# Patient Record
Sex: MALE | Race: WHITE | NOT HISPANIC OR LATINO | Employment: FULL TIME | URBAN - METROPOLITAN AREA
[De-identification: names, ages, dates, MRNs, and addresses within clinical notes are randomized per-mention and may not be internally consistent; named-entity substitution may affect disease eponyms.]

---

## 2019-06-14 ENCOUNTER — TELEPHONE (OUTPATIENT)
Dept: ENDOCRINOLOGY | Facility: CLINIC | Age: 24
End: 2019-06-14

## 2019-06-14 NOTE — TELEPHONE ENCOUNTER
----- Message from Yanira Coombs sent at 6/14/2019  9:54 AM CDT -----  Contact: pt's mother  Pt's mother called     Dr Frances had  Spoken to you about this pt and low testosterone levels    Pt had appt yesterday but was not notified       Just discovered today when mother called to see if we know anything about getting an appt    Can you please work this pt in   And  Call mother - Gina      [phone]    Thanks

## 2019-06-14 NOTE — TELEPHONE ENCOUNTER
Left a message for pt mother. We do not have any slot available at this time. July 1st is when Nov scheduled will be out. Pt mother and pt can call at that time to scheduled. Pt is on our waiting list. I also talk to neil who handles reminder phone call she have on paper she call pt three times. Pt VM was not set up.

## 2019-06-17 ENCOUNTER — TELEPHONE (OUTPATIENT)
Dept: ENDOCRINOLOGY | Facility: CLINIC | Age: 24
End: 2019-06-17

## 2019-06-17 NOTE — TELEPHONE ENCOUNTER
Left message regarding appointment. Appointment scheduled with Dr Luu on 07/01 at 9:00am.     Appointment reminder will be mailed to patient

## 2019-07-01 ENCOUNTER — OFFICE VISIT (OUTPATIENT)
Dept: ENDOCRINOLOGY | Facility: CLINIC | Age: 24
End: 2019-07-01
Payer: COMMERCIAL

## 2019-07-01 VITALS
HEIGHT: 74 IN | BODY MASS INDEX: 26 KG/M2 | SYSTOLIC BLOOD PRESSURE: 106 MMHG | HEART RATE: 60 BPM | DIASTOLIC BLOOD PRESSURE: 78 MMHG | WEIGHT: 202.63 LBS

## 2019-07-01 DIAGNOSIS — R94.7 ABNORMAL RESULTS OF OTHER ENDOCRINE FUNCTION STUDIES: Primary | ICD-10-CM

## 2019-07-01 DIAGNOSIS — R94.7 ENDOCRINE FUNCTION STUDY ABNORMALITY: ICD-10-CM

## 2019-07-01 PROCEDURE — 3008F BODY MASS INDEX DOCD: CPT | Mod: CPTII,S$GLB,, | Performed by: INTERNAL MEDICINE

## 2019-07-01 PROCEDURE — 99999 PR PBB SHADOW E&M-EST. PATIENT-LVL III: CPT | Mod: PBBFAC,,, | Performed by: INTERNAL MEDICINE

## 2019-07-01 PROCEDURE — 99999 PR PBB SHADOW E&M-EST. PATIENT-LVL III: ICD-10-PCS | Mod: PBBFAC,,, | Performed by: INTERNAL MEDICINE

## 2019-07-01 PROCEDURE — 99204 PR OFFICE/OUTPT VISIT, NEW, LEVL IV, 45-59 MIN: ICD-10-PCS | Mod: S$GLB,,, | Performed by: INTERNAL MEDICINE

## 2019-07-01 PROCEDURE — 3008F PR BODY MASS INDEX (BMI) DOCUMENTED: ICD-10-PCS | Mod: CPTII,S$GLB,, | Performed by: INTERNAL MEDICINE

## 2019-07-01 PROCEDURE — 99204 OFFICE O/P NEW MOD 45 MIN: CPT | Mod: S$GLB,,, | Performed by: INTERNAL MEDICINE

## 2019-07-01 RX ORDER — TESTOSTERONE CYPIONATE 200 MG/ML
INJECTION, SOLUTION INTRAMUSCULAR
Refills: 2 | COMMUNITY
Start: 2019-06-23 | End: 2019-09-06

## 2019-07-01 NOTE — PROGRESS NOTES
Subjective:      Patient ID: Ab Girard is a 24 y.o. male.    Chief Complaint:  Hypogonadism      History of Present Illness  With regards to hypogonadism:  Mom is here   She gets pellets with Dr Frances     Symptoms started  Years ago - since puberty  + Fatigue   +mental fogginess   +depression  trt did not help these symptoms '  Puberty was nl     Nl libido   Has a partner   satisfied with performance  Maybe drive is better with trt    No nipple tenderness or gynecomastia     Does not snore   No children   No fertility attempts       Various protein powders - no t boosters or anabolics used          pcp checked T  - Dr Brandt   3/15/19   tsh nl  cmp nl  H/h 15/46   t t 240  915 am     trt was started 200 mg q 2 weeks     5/10/19 right before injection was due   t was 135   H/h 15/48   The dose was increased to 200 mg q week  He does 100 mg 2 x week       Reproductive:  No prior h/o hypogonadism.  Normal development.   Puberty onset at age      possible slight decrease  in testicular size.   Able to engage in and complete intercourse      No h/o liver and kidney disease  No h/o hyperthyroidism        Review of Systems   Constitutional: Negative for unexpected weight change.   Eyes: Negative for visual disturbance.   Respiratory: Negative for shortness of breath.    Cardiovascular: Negative for chest pain.   Gastrointestinal: Negative for abdominal pain.   Musculoskeletal: Negative for myalgias.   Skin: Negative for wound.   Neurological: Negative for headaches.   Hematological: Does not bruise/bleed easily.   Psychiatric/Behavioral: Negative for sleep disturbance.       Objective:   Physical Exam   Constitutional: He appears well-developed.   HENT:   Right Ear: External ear normal.   Left Ear: External ear normal.   Nose: Nose normal.   Hearing normal    Neck: No tracheal deviation present. No thyromegaly present.   Cardiovascular: Normal rate.   No murmur heard.  Pulmonary/Chest: Effort normal and breath sounds  normal.   Abdominal: Soft. He exhibits no mass.   No hernia noted   Musculoskeletal: He exhibits no edema.   Neurological: He is alert. No cranial nerve deficit or sensory deficit. Coordination and gait normal.        Skin: No rash noted.   No nodules   Psychiatric: He has a normal mood and affect. Judgment normal.   Vitals reviewed.  no gynecomastia   Nl testicular volume       Body mass index is 26.01 kg/m².    Lab Reviewed:       Assessment and Plan     Endocrine function study abnormality  Hold trt for 1 month     Repeat Testosterone paired with LH, PRL  - this will determine next step       No apparent risk factors identified at this time.      If trt needed would consider pellets

## 2019-07-01 NOTE — ASSESSMENT & PLAN NOTE
Repeat Testosterone paired with LH, PRL  - this will determine next step       No apparent risk factors identified at this time.      If trt needed would consider pellets

## 2019-07-01 NOTE — LETTER
July 1, 2019      Modesta Frances MD  [address]  [address]  [facility] [address]           [facility] - Endocrinology [address]th [place]  [address]  [place] [address]  Phone: [phone]          Patient: Ab Girard   MR Number: [id number]   YOB: 1995   Date of Visit: 7/1/2019       Dear Dr. Modesta Frances:    Thank you for referring Ab Girard to me for evaluation. Attached you will find relevant portions of my assessment and plan of care.    If you have questions, please do not hesitate to call me. I look forward to following Ab Girard along with you.    Sincerely,    Estelita Luu MD    Enclosure  CC:  No Recipients    If you would like to receive this communication electronically, please contact [email or web address] or [phone] to request more information on [employer] Link access.    For providers and/or their staff who would like to refer a patient to [facility], please contact us through our one-stop-shop provider referral line, [facility], at [phone].    If you feel you have received this communication in error or would no longer like to receive these types of communications, please e-mail [email or web address]

## 2019-07-26 ENCOUNTER — LAB VISIT (OUTPATIENT)
Dept: LAB | Facility: HOSPITAL | Age: 24
End: 2019-07-26
Attending: INTERNAL MEDICINE
Payer: COMMERCIAL

## 2019-07-26 DIAGNOSIS — R94.7 ABNORMAL RESULTS OF OTHER ENDOCRINE FUNCTION STUDIES: ICD-10-CM

## 2019-07-26 LAB
ALBUMIN SERPL BCP-MCNC: 4.7 G/DL (ref 3.5–5.2)
ALP SERPL-CCNC: 66 U/L (ref 38–126)
ALT SERPL W/O P-5'-P-CCNC: 78 U/L (ref 10–44)
ANION GAP SERPL CALC-SCNC: 8 MMOL/L (ref 8–16)
AST SERPL-CCNC: 48 U/L (ref 15–46)
BILIRUB SERPL-MCNC: 1 MG/DL (ref 0.1–1)
BUN SERPL-MCNC: 25 MG/DL (ref 2–20)
CALCIUM SERPL-MCNC: 10.2 MG/DL (ref 8.7–10.5)
CHLORIDE SERPL-SCNC: 99 MMOL/L (ref 95–110)
CO2 SERPL-SCNC: 33 MMOL/L (ref 23–29)
CREAT SERPL-MCNC: 1.27 MG/DL (ref 0.5–1.4)
EST. GFR  (AFRICAN AMERICAN): >60 ML/MIN/1.73 M^2
EST. GFR  (NON AFRICAN AMERICAN): >60 ML/MIN/1.73 M^2
ESTRADIOL SERPL-MCNC: 16 PG/ML (ref 11–44)
FSH SERPL-ACNC: 1.3 MIU/ML (ref 0.95–11.95)
GLUCOSE SERPL-MCNC: 55 MG/DL (ref 70–110)
LH SERPL-ACNC: 1.9 MIU/ML (ref 0.6–12.1)
POTASSIUM SERPL-SCNC: 4 MMOL/L (ref 3.5–5.1)
PROLACTIN SERPL IA-MCNC: 21.1 NG/ML (ref 3.5–19.4)
PROT SERPL-MCNC: 7.3 G/DL (ref 6–8.4)
SODIUM SERPL-SCNC: 140 MMOL/L (ref 136–145)
TESTOST SERPL-MCNC: 390 NG/DL (ref 304–1227)

## 2019-07-26 PROCEDURE — 83002 ASSAY OF GONADOTROPIN (LH): CPT | Mod: PO

## 2019-07-26 PROCEDURE — 80053 COMPREHEN METABOLIC PANEL: CPT | Mod: PO

## 2019-07-26 PROCEDURE — 84146 ASSAY OF PROLACTIN: CPT | Mod: PO

## 2019-07-26 PROCEDURE — 82670 ASSAY OF TOTAL ESTRADIOL: CPT | Mod: PO

## 2019-07-26 PROCEDURE — 82040 ASSAY OF SERUM ALBUMIN: CPT | Mod: PO

## 2019-07-26 PROCEDURE — 83001 ASSAY OF GONADOTROPIN (FSH): CPT | Mod: PO

## 2019-07-26 PROCEDURE — 84403 ASSAY OF TOTAL TESTOSTERONE: CPT | Mod: PO

## 2019-07-29 ENCOUNTER — TELEPHONE (OUTPATIENT)
Dept: ENDOCRINOLOGY | Facility: CLINIC | Age: 24
End: 2019-07-29

## 2019-07-29 ENCOUNTER — PATIENT MESSAGE (OUTPATIENT)
Dept: ENDOCRINOLOGY | Facility: CLINIC | Age: 24
End: 2019-07-29

## 2019-07-29 DIAGNOSIS — R94.7 ENDOCRINE FUNCTION STUDY ABNORMALITY: Primary | ICD-10-CM

## 2019-07-31 LAB
ALBUMIN SERPL-MCNC: 4.8 G/DL (ref 3.6–5.1)
SHBG SERPL-SCNC: 37 NMOL/L (ref 10–50)
TESTOST FREE SERPL-MCNC: 42.9 PG/ML (ref 46–224)
TESTOST SERPL-MCNC: 370 NG/DL (ref 250–1100)
TESTOSTERONE.FREE+WB SERPL-MCNC: 93.7 NG/DL (ref 110–575)

## 2019-08-02 ENCOUNTER — LAB VISIT (OUTPATIENT)
Dept: LAB | Facility: HOSPITAL | Age: 24
End: 2019-08-02
Attending: INTERNAL MEDICINE
Payer: COMMERCIAL

## 2019-08-02 DIAGNOSIS — R94.7 ENDOCRINE FUNCTION STUDY ABNORMALITY: ICD-10-CM

## 2019-08-02 LAB
ALBUMIN SERPL BCP-MCNC: 4.3 G/DL (ref 3.5–5.2)
ALP SERPL-CCNC: 65 U/L (ref 38–126)
ALT SERPL W/O P-5'-P-CCNC: 55 U/L (ref 10–44)
ANION GAP SERPL CALC-SCNC: 7 MMOL/L (ref 8–16)
AST SERPL-CCNC: 48 U/L (ref 15–46)
BILIRUB SERPL-MCNC: 0.5 MG/DL (ref 0.1–1)
BUN SERPL-MCNC: 25 MG/DL (ref 2–20)
CALCIUM SERPL-MCNC: 9.9 MG/DL (ref 8.7–10.5)
CHLORIDE SERPL-SCNC: 101 MMOL/L (ref 95–110)
CO2 SERPL-SCNC: 32 MMOL/L (ref 23–29)
CREAT SERPL-MCNC: 1.48 MG/DL (ref 0.5–1.4)
EST. GFR  (AFRICAN AMERICAN): >60 ML/MIN/1.73 M^2
EST. GFR  (NON AFRICAN AMERICAN): >60 ML/MIN/1.73 M^2
FERRITIN SERPL-MCNC: 82 NG/ML (ref 20–300)
GLUCOSE SERPL-MCNC: 66 MG/DL (ref 70–110)
POTASSIUM SERPL-SCNC: 4.1 MMOL/L (ref 3.5–5.1)
PROLACTIN SERPL IA-MCNC: 19.3 NG/ML (ref 3.5–19.4)
PROT SERPL-MCNC: 6.8 G/DL (ref 6–8.4)
SODIUM SERPL-SCNC: 140 MMOL/L (ref 136–145)

## 2019-08-02 PROCEDURE — 84305 ASSAY OF SOMATOMEDIN: CPT | Mod: PO

## 2019-08-02 PROCEDURE — 82040 ASSAY OF SERUM ALBUMIN: CPT | Mod: PO

## 2019-08-02 PROCEDURE — 84146 ASSAY OF PROLACTIN: CPT | Mod: PO

## 2019-08-02 PROCEDURE — 82728 ASSAY OF FERRITIN: CPT

## 2019-08-02 PROCEDURE — 80053 COMPREHEN METABOLIC PANEL: CPT | Mod: PO

## 2019-08-07 LAB
ALBUMIN SERPL-MCNC: 4.5 G/DL (ref 3.6–5.1)
SHBG SERPL-SCNC: 36 NMOL/L (ref 10–50)
TESTOST FREE SERPL-MCNC: 38.7 PG/ML (ref 46–224)
TESTOST SERPL-MCNC: 325 NG/DL (ref 250–1100)
TESTOSTERONE.FREE+WB SERPL-MCNC: 79.6 NG/DL (ref 110–575)

## 2019-08-08 ENCOUNTER — DOCUMENTATION ONLY (OUTPATIENT)
Dept: TRANSPLANT | Facility: CLINIC | Age: 24
End: 2019-08-08

## 2019-08-08 DIAGNOSIS — R74.8 ABNORMAL LIVER ENZYMES: Primary | ICD-10-CM

## 2019-08-08 DIAGNOSIS — R94.7 ENDOCRINE FUNCTION STUDY ABNORMALITY: ICD-10-CM

## 2019-08-08 LAB
IGF-I SERPL-MCNC: 138 NG/ML (ref 91–442)
IGF-I Z-SCORE SERPL: -1.28 SD

## 2019-08-08 NOTE — NURSING
Pt records reviewed.   Pt will be referred to Hepatology.  Abnormal liver enzymes  Initial referral received  from the [facility]que.   Referring Provider/diagnosis  Estelita Luu MD      Referral letter sent to patient.

## 2019-08-08 NOTE — LETTER
August 8, 2019    Ab Girard  [address] [place] [id number]      Dear Ab Girard:    Your doctor has referred you to the [facility]. We are sending this letter to remind you to make an appointment with us to complete the referral process.     Please call us at [phone] to schedule an appointment. We look forward to seeing you soon.     If you received a call and have been scheduled, please disregard this letter.       Sincerely,        [facility]   [address] [place] [id number]  [phone]

## 2019-08-12 ENCOUNTER — TELEPHONE (OUTPATIENT)
Dept: ENDOCRINOLOGY | Facility: CLINIC | Age: 24
End: 2019-08-12

## 2019-08-12 NOTE — TELEPHONE ENCOUNTER
----- Message from Ene Reyes sent at 8/12/2019 12:05 PM CDT -----  Contact:    Gina  [phone]  Needs Advice    Reason for call:   An appt         Communication Preference:  Phone     Additional Information:   Returning the nurse in regards to scheduling an MRI appt for the pt

## 2019-08-21 ENCOUNTER — PATIENT MESSAGE (OUTPATIENT)
Dept: ENDOCRINOLOGY | Facility: CLINIC | Age: 24
End: 2019-08-21

## 2019-08-21 DIAGNOSIS — R94.7 ENDOCRINE FUNCTION STUDY ABNORMALITY: Primary | ICD-10-CM

## 2019-09-06 ENCOUNTER — PROCEDURE VISIT (OUTPATIENT)
Dept: HEPATOLOGY | Facility: CLINIC | Age: 24
End: 2019-09-06
Attending: INTERNAL MEDICINE
Payer: COMMERCIAL

## 2019-09-06 ENCOUNTER — PATIENT MESSAGE (OUTPATIENT)
Dept: TRANSPLANT | Facility: CLINIC | Age: 24
End: 2019-09-06

## 2019-09-06 ENCOUNTER — HOSPITAL ENCOUNTER (OUTPATIENT)
Dept: RADIOLOGY | Facility: HOSPITAL | Age: 24
Discharge: HOME | End: 2019-09-06
Attending: INTERNAL MEDICINE
Payer: COMMERCIAL

## 2019-09-06 ENCOUNTER — OFFICE VISIT (OUTPATIENT)
Dept: HEPATOLOGY | Facility: CLINIC | Age: 24
End: 2019-09-06
Payer: COMMERCIAL

## 2019-09-06 ENCOUNTER — TELEPHONE (OUTPATIENT)
Dept: TRANSPLANT | Facility: CLINIC | Age: 24
End: 2019-09-06

## 2019-09-06 VITALS
OXYGEN SATURATION: 96 % | RESPIRATION RATE: 18 BRPM | SYSTOLIC BLOOD PRESSURE: 119 MMHG | HEART RATE: 49 BPM | BODY MASS INDEX: 24.84 KG/M2 | DIASTOLIC BLOOD PRESSURE: 64 MMHG | HEIGHT: 74 IN | WEIGHT: 193.56 LBS | TEMPERATURE: 97 F

## 2019-09-06 DIAGNOSIS — R74.8 ELEVATED LIVER ENZYMES: ICD-10-CM

## 2019-09-06 DIAGNOSIS — R74.8 ELEVATED LIVER ENZYMES: Primary | ICD-10-CM

## 2019-09-06 PROCEDURE — 76705 ECHO EXAM OF ABDOMEN: CPT | Mod: TC

## 2019-09-06 PROCEDURE — 93975 VASCULAR STUDY: CPT | Mod: 26,,, | Performed by: RADIOLOGY

## 2019-09-06 PROCEDURE — 3008F BODY MASS INDEX DOCD: CPT | Mod: CPTII,S$GLB,, | Performed by: INTERNAL MEDICINE

## 2019-09-06 PROCEDURE — 76705 US LIVER WITH DOPPLER: ICD-10-PCS | Mod: 26,59,, | Performed by: RADIOLOGY

## 2019-09-06 PROCEDURE — 91200 LIVER ELASTOGRAPHY: CPT | Mod: S$GLB,,, | Performed by: INTERNAL MEDICINE

## 2019-09-06 PROCEDURE — 93975 VASCULAR STUDY: CPT | Mod: TC

## 2019-09-06 PROCEDURE — 93975 US LIVER WITH DOPPLER: ICD-10-PCS | Mod: 26,,, | Performed by: RADIOLOGY

## 2019-09-06 PROCEDURE — 3008F PR BODY MASS INDEX (BMI) DOCUMENTED: ICD-10-PCS | Mod: CPTII,S$GLB,, | Performed by: INTERNAL MEDICINE

## 2019-09-06 PROCEDURE — 99204 OFFICE O/P NEW MOD 45 MIN: CPT | Mod: S$GLB,,, | Performed by: INTERNAL MEDICINE

## 2019-09-06 PROCEDURE — 91200 PR LIVER ELASTOGRAPHY W/OUT IMAG W/INTERP & REPORT: ICD-10-PCS | Mod: S$GLB,,, | Performed by: INTERNAL MEDICINE

## 2019-09-06 PROCEDURE — 99999 PR PBB SHADOW E&M-EST. PATIENT-LVL IV: CPT | Mod: PBBFAC,,, | Performed by: INTERNAL MEDICINE

## 2019-09-06 PROCEDURE — 99204 PR OFFICE/OUTPT VISIT, NEW, LEVL IV, 45-59 MIN: ICD-10-PCS | Mod: S$GLB,,, | Performed by: INTERNAL MEDICINE

## 2019-09-06 PROCEDURE — 76705 ECHO EXAM OF ABDOMEN: CPT | Mod: 26,59,, | Performed by: RADIOLOGY

## 2019-09-06 PROCEDURE — 99999 PR PBB SHADOW E&M-EST. PATIENT-LVL IV: ICD-10-PCS | Mod: PBBFAC,,, | Performed by: INTERNAL MEDICINE

## 2019-09-06 NOTE — PATIENT INSTRUCTIONS
· Your liver is irritated but your liver is functioning well  · Stop taking Creatinine supplements  · Ok to consume lean protein  · Please schedule blood tests and Ultrasound

## 2019-09-06 NOTE — LETTER
September 8, 2019      Estelita Luu MD  [address]  [place] [address]           [facility] - Hepatology  [address]  [place] [address]  Phone: [phone]  Fax: [phone]          Patient: Ab Girard   MR Number: [id number]   YOB: 1995   Date of Visit: 9/6/2019       Dear Dr. Estelita Luu:    Thank you for referring Ab Girard to me for evaluation. Attached you will find relevant portions of my assessment and plan of care.    If you have questions, please do not hesitate to call me. I look forward to following Ab Girard along with you.    Sincerely,    Antony Decker MD    Enclosure  CC:  No Recipients    If you would like to receive this communication electronically, please contact [email or web address] or [phone] to request more information on [employer] Link access.    For providers and/or their staff who would like to refer a patient to [facility], please contact us through our one-stop-shop provider referral line, [facility], at [phone].    If you feel you have received this communication in error or would no longer like to receive these types of communications, please e-mail [email or web address]

## 2019-09-06 NOTE — PROCEDURES
Procedures   Vibration-controlled Transient Elastography Procedure     Name: Ab Girard  Date of Procedure : 2019   :: Antnoy Decker MD  Diagnosis: other - elevated liver tests    Probe: M    Universal Protocol: Patient's identity, procedure and site were verified, confirmatory pause was performed.  Discussed procedure including risks and potential complications.  Questions answered.  Patient verbalizes understanding and wishes to proceed with VCTE.     Procedure: After providing explanations of the procedure, patient was placed in the supine position with right arm in maximum abduction to allow optimal exposure of right lateral abdomen.  Patient was briefly assessed, Testing was performed in the mid-axillary location, 50Hz Shear Wave pulses were applied and the resulting Shear Wave and Propagation Speed detected with a 3.5 MHz ultrasonic signal, using the FibroScan probe, Skin to liver capsule distance and liver parenchyma were accessed during the entire examination with the FibroScan probe, Patient was instructed to breathe normally and to abstain from sudden movements during the procedure, allowing for random measurements of liver stiffness. At least 10 Shear Waves were produced, Individual measurements of each Shear Wave were calculated.  Patient tolerated the procedure well with no complications.  Meets discharge criteria as was dismissed.  Rates pain 0 out of 10.  Patient will follow up with ordering provider to review results.      Findings  Median liver stiffness score: 7.3 KPa  CAP readin dB/m    IQR/med: 7 %    Interpretation  Fibrosis interpretation is based on medial liver stiffness - Kilopascal (kPa).     Fibrosis stage: F 0-1     Steatosis interpretation is based on controlled attenuation parameter - (dB/m).    Steatosis grade: <S1       Antony Decker MD  Staff Physician  Transplant Hepatology  [facility]

## 2019-09-08 NOTE — PROGRESS NOTES
Hepatology Consult Note    Referring provider: Dr. Estelita Luu    Chief complaint:   Chief Complaint   Patient presents with    Elevated Hepatic Enzymes       HPI:  Ab Girard is a pleasant 24 y.o. malewho was referred to Hepatology Clinic for consultation of had concerns including Elevated Hepatic Enzymes..      As regards to liver disease,  - The patient reports no symptoms of hepatitis including malaise or flu-like symptoms to suggest a flare.  - The patient reports no new manifestations of portal hypertension including ascites, edema, GI bleeding, or hepatic encephalopathy to suggest liver decompensation.  - The patient reports no fevers/chills or pruritis to suggest biliary disease.      Denies alcohol use. No family hx of liver diseases.  No risk factor for NAFLD.  Pt works out a lot, takes Creatinine supplemnets, whey isolate, denies hormones, herbal use.      Patient Active Problem List   Diagnosis    Endocrine function study abnormality       History reviewed. No pertinent past medical history.    Past Surgical History:   Procedure Laterality Date    JACQUE PROCEDURE      TONSILLECTOMY         Family History   Problem Relation Age of Onset    Heart attack Paternal Grandfather     Heart disease Paternal Grandfather        Social History     Socioeconomic History    Marital status: Single     Spouse name: Not on file    Number of children: Not on file    Years of education: Not on file    Highest education level: Not on file   Occupational History    Not on file   Social Needs    Financial resource strain: Not on file    Food insecurity:     Worry: Not on file     Inability: Not on file    Transportation needs:     Medical: Not on file     Non-medical: Not on file   Tobacco Use    Smoking status: Never Smoker    Smokeless tobacco: Never Used   Substance and Sexual Activity    Alcohol use: Not Currently     Comment: rarely    Drug use: Not Currently     Types: Marijuana    Sexual  "activity: Yes     Partners: Female     Birth control/protection: Condom   Lifestyle    Physical activity:     Days per week: Not on file     Minutes per session: Not on file    Stress: Not on file   Relationships    Social connections:     Talks on phone: Not on file     Gets together: Not on file     Attends Shinto service: Not on file     Active member of club or organization: Not on file     Attends meetings of clubs or organizations: Not on file     Relationship status: Not on file   Other Topics Concern    Not on file   Social History Narrative    Not on file       No current outpatient medications on file.     No current facility-administered medications for this visit.        Review of patient's allergies indicates:  No Known Allergies    Review of Systems   Constitutional: Negative for chills, fever, malaise/fatigue and weight loss.   HENT: Negative for congestion, nosebleeds and sore throat.    Eyes: Negative for blurred vision, double vision and photophobia.   Respiratory: Negative for cough and shortness of breath.    Cardiovascular: Negative for chest pain, palpitations, orthopnea and leg swelling.   Gastrointestinal: Negative for abdominal pain, blood in stool, constipation, diarrhea, melena and vomiting.   Genitourinary: Negative for dysuria.   Musculoskeletal: Negative for falls and joint pain.   Skin: Negative for itching and rash.   Neurological: Negative for dizziness, tremors and weakness.   Endo/Heme/Allergies: Does not bruise/bleed easily.   Psychiatric/Behavioral: Negative for depression and substance abuse. The patient is not nervous/anxious and does not have insomnia.        Vitals:    09/06/19 0833   BP: 119/64   Pulse: (!) 49   Resp: 18   Temp: 96.8 °F (36 °C)   SpO2: 96%   Weight: 87.8 kg (193 lb 9 oz)   Height: 6' 2" (1.88 m)       Physical Exam   Constitutional: He is oriented to person, place, and time. He appears well-developed and well-nourished. No distress.   HENT:   Head: " Normocephalic and atraumatic.   Mouth/Throat: Oropharynx is clear and moist.   Eyes: Conjunctivae are normal. No scleral icterus.   Neck: Normal range of motion.   Cardiovascular: Normal rate, regular rhythm, normal heart sounds and intact distal pulses.   Pulmonary/Chest: Effort normal and breath sounds normal. No respiratory distress. He has no wheezes. He has no rales.   Abdominal: Soft. Normal appearance and bowel sounds are normal. He exhibits no shifting dullness, no distension, no pulsatile liver, no fluid wave and no ascites. There is no splenomegaly or hepatomegaly. No hernia.   Musculoskeletal: He exhibits no edema.   Neurological: He is alert and oriented to person, place, and time. He is not disoriented.   Skin: Skin is warm and dry. No rash noted. He is not diaphoretic.   Psychiatric: He has a normal mood and affect. His behavior is normal. His mood appears not anxious. His affect is not inappropriate. He is not agitated. He is communicative. He is attentive.   Nursing note and vitals reviewed.      LABS: I personally reviewed pertinent laboratory findings.    Lab Results   Component Value Date    ALT 29 09/06/2019    AST 30 09/06/2019    ALKPHOS 78 09/06/2019    BILITOT 1.3 (H) 09/06/2019       Lab Results   Component Value Date    WBC 5.06 09/06/2019    HGB 15.5 09/06/2019    HCT 49.0 09/06/2019    MCV 90 09/06/2019     09/06/2019       Lab Results   Component Value Date     08/02/2019    K 4.1 08/02/2019     08/02/2019    CO2 32 (H) 08/02/2019    BUN 25 (H) 08/02/2019    CREATININE 1.48 (H) 08/02/2019    CALCIUM 9.9 08/02/2019    ANIONGAP 7 (L) 08/02/2019    ESTGFRAFRICA >60.0 08/02/2019    EGFRNONAA >60.0 08/02/2019       Lab Results   Component Value Date    HEPCAB Negative 09/06/2019       No results found for: SMOOTHMUSCAB, MITOAB    I personally reviewed all recent lab results.  I personally reviewed imaging studies.    Assessment:  24 y.o. male presenting with     1. Elevated  liver enzymes      Acute hepatocellular pattern hepatitis. No signs of synthetic dysfunction or liver failure.    Recommendation(s):  - will order serologies/autoimmune markers/iron/ceruloplasmin and US liver doppler to r/o other possible causes of chronic liver disease for the sake of thoroughness in work-up  - stop/avoid creatinine supplements    A total of 45 minutes were spent face-to-face with the patient during this encounter and over half of that time was spent on counseling and coordination of care.  We discussed in depth the nature of the patient's liver disease, the management plan in details. I also educated the patient about lifestyle modifications which may improve hepatic steatosis, overweight/obesity, insulin resistance and high blood pressure issues. I have provided the patient with an opportunity to ask questions and have all questions answered to his satisfaction.       I have sent communication to the referring physician and/or primary care provider.    Antony Decker MD  Staff Physician  Hepatology and Liver Transplant  [facility]  [facility]

## 2019-09-17 ENCOUNTER — PATIENT MESSAGE (OUTPATIENT)
Dept: ENDOCRINOLOGY | Facility: CLINIC | Age: 24
End: 2019-09-17

## 2019-09-20 ENCOUNTER — LAB VISIT (OUTPATIENT)
Dept: LAB | Facility: HOSPITAL | Age: 24
End: 2019-09-20
Attending: INTERNAL MEDICINE
Payer: COMMERCIAL

## 2019-09-20 DIAGNOSIS — R94.7 ENDOCRINE FUNCTION STUDY ABNORMALITY: ICD-10-CM

## 2019-09-20 PROCEDURE — 36415 COLL VENOUS BLD VENIPUNCTURE: CPT | Mod: PO

## 2019-09-20 PROCEDURE — 82040 ASSAY OF SERUM ALBUMIN: CPT | Mod: PO

## 2019-09-25 ENCOUNTER — PATIENT MESSAGE (OUTPATIENT)
Dept: ENDOCRINOLOGY | Facility: CLINIC | Age: 24
End: 2019-09-25

## 2019-09-26 LAB
ALBUMIN SERPL-MCNC: 4.7 G/DL (ref 3.6–5.1)
SHBG SERPL-SCNC: 33 NMOL/L (ref 10–50)
TESTOST FREE SERPL-MCNC: 50 PG/ML (ref 46–224)
TESTOST SERPL-MCNC: 388 NG/DL (ref 250–1100)
TESTOSTERONE.FREE+WB SERPL-MCNC: 107.1 NG/DL (ref 110–575)

## 2019-09-30 ENCOUNTER — TELEPHONE (OUTPATIENT)
Dept: ENDOCRINOLOGY | Facility: CLINIC | Age: 24
End: 2019-09-30

## 2019-09-30 DIAGNOSIS — R94.7 ENDOCRINE FUNCTION STUDY ABNORMALITY: Primary | ICD-10-CM

## 2021-05-30 ENCOUNTER — CLINICAL SUPPORT (OUTPATIENT)
Dept: URGENT CARE | Facility: CLINIC | Age: 26
End: 2021-05-30
Payer: COMMERCIAL

## 2021-05-30 VITALS — TEMPERATURE: 99 F

## 2021-05-30 DIAGNOSIS — Z11.52 ENCOUNTER FOR SCREENING FOR COVID-19: Primary | ICD-10-CM

## 2021-05-30 DIAGNOSIS — Z20.822 ENCOUNTER FOR LABORATORY TESTING FOR COVID-19 VIRUS: ICD-10-CM

## 2021-05-30 PROCEDURE — 99211 OFF/OP EST MAY X REQ PHY/QHP: CPT | Mod: S$GLB,,, | Performed by: INTERNAL MEDICINE

## 2021-05-30 PROCEDURE — 99211 PR OFFICE/OUTPT VISIT, EST, LEVL I: ICD-10-PCS | Mod: S$GLB,,, | Performed by: INTERNAL MEDICINE

## 2021-05-30 PROCEDURE — U0005 INFEC AGEN DETEC AMPLI PROBE: HCPCS | Performed by: INTERNAL MEDICINE

## 2021-05-30 PROCEDURE — U0003 INFECTIOUS AGENT DETECTION BY NUCLEIC ACID (DNA OR RNA); SEVERE ACUTE RESPIRATORY SYNDROME CORONAVIRUS 2 (SARS-COV-2) (CORONAVIRUS DISEASE [COVID-19]), AMPLIFIED PROBE TECHNIQUE, MAKING USE OF HIGH THROUGHPUT TECHNOLOGIES AS DESCRIBED BY CMS-2020-01-R: HCPCS | Performed by: INTERNAL MEDICINE

## 2021-05-31 LAB — SARS-COV-2 RNA RESP QL NAA+PROBE: NOT DETECTED

## 2022-04-25 ENCOUNTER — HOSPITAL ENCOUNTER (EMERGENCY)
Facility: HOSPITAL | Age: 27
Discharge: HOME | End: 2022-04-25
Attending: EMERGENCY MEDICINE
Payer: COMMERCIAL

## 2022-04-25 VITALS
HEART RATE: 66 BPM | TEMPERATURE: 99 F | BODY MASS INDEX: 28.6 KG/M2 | HEIGHT: 75 IN | WEIGHT: 230 LBS | DIASTOLIC BLOOD PRESSURE: 59 MMHG | OXYGEN SATURATION: 95 % | RESPIRATION RATE: 21 BRPM | SYSTOLIC BLOOD PRESSURE: 122 MMHG

## 2022-04-25 DIAGNOSIS — R94.31 ABNORMAL EKG: ICD-10-CM

## 2022-04-25 LAB
ALBUMIN SERPL BCP-MCNC: 4 G/DL (ref 3.5–5.2)
ALP SERPL-CCNC: 50 U/L (ref 55–135)
ALT SERPL W/O P-5'-P-CCNC: 52 U/L (ref 10–44)
ANION GAP SERPL CALC-SCNC: 9 MMOL/L (ref 8–16)
AST SERPL-CCNC: 35 U/L (ref 10–40)
BASOPHILS # BLD AUTO: 0.03 K/UL (ref 0–0.2)
BASOPHILS NFR BLD: 0.5 % (ref 0–1.9)
BILIRUB SERPL-MCNC: 1 MG/DL (ref 0.1–1)
BUN SERPL-MCNC: 21 MG/DL (ref 6–20)
CALCIUM SERPL-MCNC: 10 MG/DL (ref 8.7–10.5)
CHLORIDE SERPL-SCNC: 103 MMOL/L (ref 95–110)
CO2 SERPL-SCNC: 26 MMOL/L (ref 23–29)
CREAT SERPL-MCNC: 1.2 MG/DL (ref 0.5–1.4)
DIFFERENTIAL METHOD: NORMAL
EOSINOPHIL # BLD AUTO: 0.1 K/UL (ref 0–0.5)
EOSINOPHIL NFR BLD: 2.1 % (ref 0–8)
ERYTHROCYTE [DISTWIDTH] IN BLOOD BY AUTOMATED COUNT: 13.3 % (ref 11.5–14.5)
EST. GFR  (AFRICAN AMERICAN): >60 ML/MIN/1.73 M^2
EST. GFR  (NON AFRICAN AMERICAN): >60 ML/MIN/1.73 M^2
GLUCOSE SERPL-MCNC: 85 MG/DL (ref 70–110)
HCT VFR BLD AUTO: 48.6 % (ref 40–54)
HGB BLD-MCNC: 16.2 G/DL (ref 14–18)
IMM GRANULOCYTES # BLD AUTO: 0.02 K/UL (ref 0–0.04)
IMM GRANULOCYTES NFR BLD AUTO: 0.3 % (ref 0–0.5)
LYMPHOCYTES # BLD AUTO: 1.6 K/UL (ref 1–4.8)
LYMPHOCYTES NFR BLD: 26.1 % (ref 18–48)
MAGNESIUM SERPL-MCNC: 2 MG/DL (ref 1.6–2.6)
MCH RBC QN AUTO: 29.4 PG (ref 27–31)
MCHC RBC AUTO-ENTMCNC: 33.3 G/DL (ref 32–36)
MCV RBC AUTO: 88 FL (ref 82–98)
MONOCYTES # BLD AUTO: 0.4 K/UL (ref 0.3–1)
MONOCYTES NFR BLD: 6.9 % (ref 4–15)
NEUTROPHILS # BLD AUTO: 4 K/UL (ref 1.8–7.7)
NEUTROPHILS NFR BLD: 64.1 % (ref 38–73)
NRBC BLD-RTO: 0 /100 WBC
PLATELET # BLD AUTO: 199 K/UL (ref 150–450)
PMV BLD AUTO: 10.9 FL (ref 9.2–12.9)
POTASSIUM SERPL-SCNC: 4.1 MMOL/L (ref 3.5–5.1)
PROT SERPL-MCNC: 6.3 G/DL (ref 6–8.4)
RBC # BLD AUTO: 5.51 M/UL (ref 4.6–6.2)
SODIUM SERPL-SCNC: 138 MMOL/L (ref 136–145)
TROPONIN I SERPL DL<=0.01 NG/ML-MCNC: <0.006 NG/ML (ref 0–0.03)
WBC # BLD AUTO: 6.21 K/UL (ref 3.9–12.7)

## 2022-04-25 PROCEDURE — 99284 EMERGENCY DEPT VISIT MOD MDM: CPT | Mod: 25

## 2022-04-25 PROCEDURE — 93005 ELECTROCARDIOGRAM TRACING: CPT

## 2022-04-25 PROCEDURE — 84484 ASSAY OF TROPONIN QUANT: CPT | Performed by: EMERGENCY MEDICINE

## 2022-04-25 PROCEDURE — 83735 ASSAY OF MAGNESIUM: CPT | Performed by: EMERGENCY MEDICINE

## 2022-04-25 PROCEDURE — 93010 ELECTROCARDIOGRAM REPORT: CPT | Mod: ,,, | Performed by: INTERNAL MEDICINE

## 2022-04-25 PROCEDURE — 80053 COMPREHEN METABOLIC PANEL: CPT | Performed by: EMERGENCY MEDICINE

## 2022-04-25 PROCEDURE — 85025 COMPLETE CBC W/AUTO DIFF WBC: CPT | Performed by: EMERGENCY MEDICINE

## 2022-04-25 PROCEDURE — 93010 EKG 12-LEAD: ICD-10-PCS | Mod: ,,, | Performed by: INTERNAL MEDICINE

## 2022-04-25 NOTE — FIRST PROVIDER EVALUATION
Emergency Department TeleTriage Encounter Note      CHIEF COMPLAINT    Chief Complaint   Patient presents with    Abnormal ECG     Pt had work physical that showed possible  right bundle branch block and referred to ER, pt denies any symptoms        VITAL SIGNS   Initial Vitals [04/25/22 1225]   BP Pulse Resp Temp SpO2   (!) 140/63 71 18 98.5 °F (36.9 °C) 97 %      MAP       --            ALLERGIES    Review of patient's allergies indicates:  No Known Allergies    PROVIDER TRIAGE NOTE  This is a teletriage evaluation of a 26 y.o. male presenting to the ED complaining of abnormal EKG.  The patient denies complaint.  Reports that he was obtaining the EKG for his employer.    Initial orders will be placed and care will be transferred to an alternate provider when patient is roomed for a full evaluation. Any additional orders and the final disposition will be determined by that provider.           ORDERS  Labs Reviewed - No data to display    ED Orders (720h ago, onward)    Start Ordered     Status Ordering Provider    04/25/22 1233 04/25/22 1232  EKG 12-lead  Once         Ordered YOSHI LAU            Virtual Visit Note: The provider triage portion of this emergency department evaluation and documentation was performed via [employer], a HIPAA-compliant telemedicine application, in concert with a tele-presenter in the room. A face to face patient evaluation with one of my colleagues will occur once the patient is placed in an emergency department room.      DISCLAIMER: This note was prepared with [employer] voice recognition transcription software. Garbled syntax, mangled pronouns, and other bizarre constructions may be attributed to that software system.

## 2022-04-25 NOTE — Clinical Note
"Ab Mcdowell" Era was seen and treated in our emergency department on 4/25/2022.  He may return to work on 04/26/2022.       If you have any questions or concerns, please don't hesitate to call.      Jennifer Stroud RN    "

## 2022-04-25 NOTE — ED NOTES
Pt in room 10 with his wife. Pt had an EKG at work as a routine check up and was told to seek further eval at ER. Pt aaox4 with no chest pain or shortness of breath. EKG was completed at triage. Pt placed on cardiac monitor, plan of care reviewed and call bell within reach.

## 2022-04-25 NOTE — ED PROVIDER NOTES
Encounter Date: 4/25/2022    SCRIBE #1 NOTE: I, Charissaniyah Nielson , am scribing for, and in the presence of, Nikole Dudley MD.       History     Chief Complaint   Patient presents with    Abnormal ECG     Pt had work physical that showed possible  right bundle branch block and referred to ER, pt denies any symptoms      26-year-old male presents to the emergency department for evaluation of an abnormal EKG. He had an appointment with his primary care doctor today for a work physical and was found to have possible right bundle branch block. They advised him to come to the emergency department for further evaluation. He denies associated chest pain, shortness of breath, cough, leg swelling, fever and diaphoresis. He has not had COVID-19 in the past. He does not have a family history of heart disease.      The history is provided by the patient.     Review of patient's allergies indicates:  No Known Allergies  No past medical history on file.  Past Surgical History:   Procedure Laterality Date    JACQUE PROCEDURE      TONSILLECTOMY       Family History   Problem Relation Age of Onset    Heart attack Paternal Grandfather     Heart disease Paternal Grandfather      Social History     Tobacco Use    Smoking status: Never Smoker    Smokeless tobacco: Never Used   Substance Use Topics    Alcohol use: Not Currently     Comment: rarely    Drug use: Not Currently     Types: Marijuana     Review of Systems   Constitutional: Negative for diaphoresis and fever.   Respiratory: Negative for cough and shortness of breath.    Cardiovascular: Negative for chest pain and leg swelling.   All other systems reviewed and are negative.      Physical Exam     Initial Vitals [04/25/22 1225]   BP Pulse Resp Temp SpO2   (!) 140/63 71 18 98.5 °F (36.9 °C) 97 %      MAP       --         Physical Exam    Nursing note and vitals reviewed.  Constitutional: He appears well-developed and well-nourished. No distress.   Eyes: EOM are normal.  Pupils are equal, round, and reactive to light.   Cardiovascular: Normal rate, regular rhythm and normal heart sounds.   Pulmonary/Chest: Breath sounds normal. No respiratory distress. He has no wheezes. He has no rhonchi. He has no rales. He exhibits no tenderness.   Abdominal: Abdomen is soft. Bowel sounds are normal. He exhibits no distension and no mass. There is no abdominal tenderness. There is no rebound and no guarding.   Musculoskeletal:         General: No edema.     Neurological: He is alert and oriented to person, place, and time.   Skin: Skin is warm and dry.   Psychiatric: He has a normal mood and affect.         ED Course   Procedures  Labs Reviewed   COMPREHENSIVE METABOLIC PANEL - Abnormal; Notable for the following components:       Result Value    BUN 21 (*)     Alkaline Phosphatase 50 (*)     ALT 52 (*)     All other components within normal limits   CBC W/ AUTO DIFFERENTIAL   TROPONIN I   MAGNESIUM   MAGNESIUM     EKG Readings: (Independently Interpreted)   Initial Reading: No STEMI. Previous EKG: Compared with most recent EKG Rhythm: Normal Sinus Rhythm. Heart Rate: 63. Ectopy: No Ectopy. Conduction: Normal. T Waves Elevated: II, III, AVF, V4, V5 and V6. Axis: Normal. Other Impression: Abnormal EKG     ECG Results          EKG 12-lead (Final result)  Result time 04/26/22 08:45:36    Final result by Interface, Lab [facility] (04/26/22 08:45:36)                 Narrative:    Test Reason : R94.31,    Vent. Rate : 063 BPM     Atrial Rate : 063 BPM     P-R Int : 162 ms          QRS Dur : 098 ms      QT Int : 354 ms       P-R-T Axes : 082 108 -36 degrees     QTc Int : 362 ms    Normal sinus rhythm with sinus arrhythmia  Incomplete right bundle branch block  Possible Right ventricular hypertrophy  Moderate voltage criteria for LVH, may be normal variant  T wave abnormality, consider inferior ischemia  T wave abnormality, consider anterolateral ischemia  Abnormal ECG  No previous ECGs  available  Confirmed by Jesus Carpenter MD ([id number]) on 4/26/2022 8:45:26 AM    Referred By:  MD           Confirmed By:Jesus Carpenter MD                            Imaging Results    None          Medications - No data to display  Medical Decision Making:   Initial Assessment:   Patient is a 26-year-old male with no known medical history who presents the ED with complaints of abnormal EKG found during a physical exam at work.  Patient is asymptomatic, hemodynamically stable with no other acute complaints.  Pain plan is to obtain troponin and basic labs and repeat EKG  Differential Diagnosis:    ACS, abnormal EKG, Brugada, prolonged QT Pulmonary embolism  Clinical Tests:   Lab Tests: Ordered and Reviewed  ED Management:  Consult to Dr. Joe Garcia who reviewed EKG, and agree that patient can be worked up palpation.  Spoke with patient and his mother at bedside.  No low suspicion for ACS at this time.  Patient giving follow-up with Dr. Hernandez.  Advised pt of the red flag sx that warrant return to the ED immediately without fail. Pt verbalized understanding and agreement to plan of care. All questions answered.     All findings were reviewed with the patient/family in detail along with the diagnosis of an abnormal EKG.  Given the history of no chest pain or dyspnea, and unremarkable troponin level , it adequate to rule out ACS in this setting.  Additionally, I have no considerable suspicion for aortic dissection/aneurysm, esophageal perforation, or acute pulmonary complications based on the presentation, exam, lab results, or imaging.    I see no indication of an emergent process beyond that addressed during our encounter but have duly counseled the patient/family regarding the need for prompt outpatient follow-up for further evaluation as well as the indications that should prompt immediate return to the emergency room should new or worrisome developments occur.  The patient has been provided with both verbal and  written instructions following the encounter.  The patient/family communicates understanding of all this information and all remaining questions and concerns were addressed at this time.              Scribe Attestation:   Scribe #1: I performed the above scribed service and the documentation accurately describes the services I performed. I attest to the accuracy of the note.                 Clinical Impression:   Final diagnoses:  [R94.31] Abnormal EKG          ED Disposition Condition    Discharge Stable        ED Prescriptions     None        Follow-up Information     Follow up With Specialties Details Why Contact Info    Anyi Rico MD Cardiology Schedule an appointment as soon as possible for a visit in 2 days  [address] [facility] [address]  [phone]      [facility] Emergency Dept Emergency Medicine  If symptoms worsen [address] [facility] [address] [phone]         I, Dr. Nikole Dudley, personally performed the services described in this documentation. All medical record entries made by the scribe were at my direction and in my presence. I have reviewed the chart and agree that the record reflects my personal performance and is accurate and complete. Nikole Dudley MD.  1:23 PM 04/26/2022     Nikole Dudley MD  04/26/22 [id number]

## 2022-04-28 ENCOUNTER — OFFICE VISIT (OUTPATIENT)
Dept: CARDIOLOGY | Facility: CLINIC | Age: 27
End: 2022-04-28
Payer: COMMERCIAL

## 2022-04-28 VITALS
BODY MASS INDEX: 28.97 KG/M2 | HEART RATE: 72 BPM | WEIGHT: 233 LBS | HEIGHT: 75 IN | SYSTOLIC BLOOD PRESSURE: 114 MMHG | OXYGEN SATURATION: 97 % | DIASTOLIC BLOOD PRESSURE: 70 MMHG

## 2022-04-28 DIAGNOSIS — I45.10 INCOMPLETE RBBB: ICD-10-CM

## 2022-04-28 PROCEDURE — 3078F PR MOST RECENT DIASTOLIC BLOOD PRESSURE < 80 MM HG: ICD-10-PCS | Mod: CPTII,S$GLB,, | Performed by: INTERNAL MEDICINE

## 2022-04-28 PROCEDURE — 99999 PR PBB SHADOW E&M-EST. PATIENT-LVL III: CPT | Mod: PBBFAC,,, | Performed by: INTERNAL MEDICINE

## 2022-04-28 PROCEDURE — 3078F DIAST BP <80 MM HG: CPT | Mod: CPTII,S$GLB,, | Performed by: INTERNAL MEDICINE

## 2022-04-28 PROCEDURE — 99203 OFFICE O/P NEW LOW 30 MIN: CPT | Mod: S$GLB,,, | Performed by: INTERNAL MEDICINE

## 2022-04-28 PROCEDURE — 1159F MED LIST DOCD IN RCRD: CPT | Mod: CPTII,S$GLB,, | Performed by: INTERNAL MEDICINE

## 2022-04-28 PROCEDURE — 3074F SYST BP LT 130 MM HG: CPT | Mod: CPTII,S$GLB,, | Performed by: INTERNAL MEDICINE

## 2022-04-28 PROCEDURE — 3074F PR MOST RECENT SYSTOLIC BLOOD PRESSURE < 130 MM HG: ICD-10-PCS | Mod: CPTII,S$GLB,, | Performed by: INTERNAL MEDICINE

## 2022-04-28 PROCEDURE — 1159F PR MEDICATION LIST DOCUMENTED IN MEDICAL RECORD: ICD-10-PCS | Mod: CPTII,S$GLB,, | Performed by: INTERNAL MEDICINE

## 2022-04-28 PROCEDURE — 99203 PR OFFICE/OUTPT VISIT, NEW, LEVL III, 30-44 MIN: ICD-10-PCS | Mod: S$GLB,,, | Performed by: INTERNAL MEDICINE

## 2022-04-28 PROCEDURE — 3008F PR BODY MASS INDEX (BMI) DOCUMENTED: ICD-10-PCS | Mod: CPTII,S$GLB,, | Performed by: INTERNAL MEDICINE

## 2022-04-28 PROCEDURE — 99999 PR PBB SHADOW E&M-EST. PATIENT-LVL III: ICD-10-PCS | Mod: PBBFAC,,, | Performed by: INTERNAL MEDICINE

## 2022-04-28 PROCEDURE — 3008F BODY MASS INDEX DOCD: CPT | Mod: CPTII,S$GLB,, | Performed by: INTERNAL MEDICINE

## 2022-04-28 NOTE — PROGRESS NOTES
Cardiology    4/28/2022  4:13 PM    Problem list  Patient Active Problem List   Diagnosis    Endocrine function study abnormality    Incomplete RBBB       CC:  Abnormal EKG    HPI:  Refer for abnormal EKG which showed incomplete right bundle branch block.  Patient had a routine physical at [facility] an EKG incomplete right bundle branch block.  Patient was sent to emergency room further workup.  Emergency room EKG also showed incomplete right bundle branch block.  Patient denies any prior cardiac problems.  Patient denies any family history of cardiac problems.  Patient is very active.  He exercises regularly.  He denies any exertional symptoms.    Medications  No current outpatient medications on file.     No current facility-administered medications for this visit.      Prior to Admission medications    Not on File         History  History reviewed. No pertinent past medical history.  Past Surgical History:   Procedure Laterality Date    [facility] PROCEDURE      TONSILLECTOMY       Social History     Socioeconomic History    Marital status: Single   Tobacco Use    Smoking status: Never Smoker    Smokeless tobacco: Never Used   Substance and Sexual Activity    Alcohol use: Not Currently     Comment: rarely    Drug use: Not Currently     Types: Marijuana    Sexual activity: Yes     Partners: Female     Birth control/protection: Condom         Allergies  Review of patient's allergies indicates:  No Known Allergies      Review of Systems   Review of Systems   Constitutional: Negative for decreased appetite, fever and weight loss.   HENT: Negative for congestion and nosebleeds.    Eyes: Negative for double vision, vision loss in left eye, vision loss in right eye and visual disturbance.   Cardiovascular: Negative for chest pain, claudication, cyanosis, dyspnea on exertion, irregular heartbeat, leg swelling, near-syncope, orthopnea, palpitations, paroxysmal nocturnal dyspnea and syncope.   Respiratory: Negative  for cough, hemoptysis, shortness of breath, sleep disturbances due to breathing, snoring, sputum production and wheezing.    Endocrine: Negative for cold intolerance and heat intolerance.   Skin: Negative for nail changes and rash.   Musculoskeletal: Negative for joint pain, muscle cramps, muscle weakness and myalgias.   Gastrointestinal: Negative for change in bowel habit, heartburn, hematemesis, hematochezia, hemorrhoids and melena.   Neurological: Negative for dizziness, focal weakness and headaches.         Physical Exam  Wt Readings from Last 1 Encounters:   04/28/22 105.7 kg (233 lb)     BP Readings from Last 3 Encounters:   04/28/22 114/70   04/25/22 (!) 122/59   09/06/19 119/64     Pulse Readings from Last 1 Encounters:   04/28/22 72     Body mass index is 29.12 kg/m².    Physical Exam  Constitutional:       Appearance: He is well-developed.   HENT:      Head: Atraumatic.   Eyes:      General: No scleral icterus.  Neck:      Vascular: Normal carotid pulses. No carotid bruit, hepatojugular reflux or JVD.   Cardiovascular:      Rate and Rhythm: Normal rate and regular rhythm.      Chest Wall: PMI is not displaced.      Pulses: Intact distal pulses.           Carotid pulses are 2+ on the right side and 2+ on the left side.       Radial pulses are 2+ on the right side and 2+ on the left side.        Dorsalis pedis pulses are 2+ on the right side and 2+ on the left side.      Heart sounds: Normal heart sounds, S1 normal and S2 normal. No murmur heard.    No friction rub.   Pulmonary:      Effort: Pulmonary effort is normal. No respiratory distress.      Breath sounds: Normal breath sounds. No stridor. No wheezing or rales.   Chest:      Chest wall: No tenderness.   Abdominal:      General: Bowel sounds are normal.      Palpations: Abdomen is soft.   Musculoskeletal:      Cervical back: Neck supple. No edema.   Skin:     General: Skin is warm and dry.      Nails: There is no clubbing.   Neurological:      Mental  Status: He is alert and oriented to person, place, and time.   Psychiatric:         Behavior: Behavior normal.         Thought Content: Thought content normal.             Assessment  1. Incomplete RBBB  Stable        Plan and Discussion  Discussed his incomplete right bundle branch block pattern on EKG is of no consequence given that he has no exertional symptoms.  He has no family history of cardiac issues.  Patient may return to work without any restrictions.  Patient does not need any further cardiac workup.    Follow Up  ALLISON Willis MD, F.A.C.C, F.S.C.A.I.

## 2022-05-16 ENCOUNTER — PATIENT MESSAGE (OUTPATIENT)
Dept: PSYCHIATRY | Facility: CLINIC | Age: 27
End: 2022-05-16
Payer: COMMERCIAL

## 2022-05-22 ENCOUNTER — PATIENT MESSAGE (OUTPATIENT)
Dept: PSYCHIATRY | Facility: CLINIC | Age: 27
End: 2022-05-22
Payer: COMMERCIAL

## 2024-04-11 ENCOUNTER — OFFICE VISIT (OUTPATIENT)
Dept: SPORTS MEDICINE | Facility: CLINIC | Age: 29
End: 2024-04-11
Payer: COMMERCIAL

## 2024-04-11 ENCOUNTER — HOSPITAL ENCOUNTER (OUTPATIENT)
Dept: RADIOLOGY | Facility: HOSPITAL | Age: 29
Discharge: HOME | End: 2024-04-11
Attending: ORTHOPAEDIC SURGERY
Payer: COMMERCIAL

## 2024-04-11 VITALS
WEIGHT: 242.5 LBS | DIASTOLIC BLOOD PRESSURE: 67 MMHG | BODY MASS INDEX: 30.15 KG/M2 | SYSTOLIC BLOOD PRESSURE: 121 MMHG | HEIGHT: 75 IN | HEART RATE: 89 BPM

## 2024-04-11 DIAGNOSIS — M25.561 PAIN IN BOTH KNEES, UNSPECIFIED CHRONICITY: ICD-10-CM

## 2024-04-11 DIAGNOSIS — M25.562 PAIN IN BOTH KNEES, UNSPECIFIED CHRONICITY: ICD-10-CM

## 2024-04-11 DIAGNOSIS — M22.40 CHONDROMALACIA OF PATELLOFEMORAL JOINT, UNSPECIFIED LATERALITY: Primary | ICD-10-CM

## 2024-04-11 PROCEDURE — 99999 PR PBB SHADOW E&M-EST. PATIENT-LVL III: CPT | Mod: PBBFAC,,, | Performed by: ORTHOPAEDIC SURGERY

## 2024-04-11 PROCEDURE — 99203 OFFICE O/P NEW LOW 30 MIN: CPT | Mod: S$GLB,,, | Performed by: ORTHOPAEDIC SURGERY

## 2024-04-11 PROCEDURE — 3074F SYST BP LT 130 MM HG: CPT | Mod: CPTII,S$GLB,, | Performed by: ORTHOPAEDIC SURGERY

## 2024-04-11 PROCEDURE — 73564 X-RAY EXAM KNEE 4 OR MORE: CPT | Mod: 26,,, | Performed by: RADIOLOGY

## 2024-04-11 PROCEDURE — 3078F DIAST BP <80 MM HG: CPT | Mod: CPTII,S$GLB,, | Performed by: ORTHOPAEDIC SURGERY

## 2024-04-11 PROCEDURE — 3008F BODY MASS INDEX DOCD: CPT | Mod: CPTII,S$GLB,, | Performed by: ORTHOPAEDIC SURGERY

## 2024-04-11 PROCEDURE — 1159F MED LIST DOCD IN RCRD: CPT | Mod: CPTII,S$GLB,, | Performed by: ORTHOPAEDIC SURGERY

## 2024-04-11 PROCEDURE — 73564 X-RAY EXAM KNEE 4 OR MORE: CPT | Mod: TC,50

## 2024-04-11 NOTE — PROGRESS NOTES
CC: bilateral knee pain    28 y.o. Male with a 1 year history of bilateral knee atraumatic pain.  Reports anterior knee pain worse with descending stairs or getting up from a seated position.  Patient works as a chemical .   Previously seen previously seen Dr. Lucero and had bilateral knee CSI and HA injections which he reports helped about 25-30%.  He states that the pain is severe and not responding to any conservative care.      He reports that the pain and weakness. It also bothers him at night.    Positive mechanical symptoms, denies instability    Is affecting ADLs.      REVIEW OF SYSTEMS:   Constitution: Negative. Negative for chills, fever and night sweats.   HENT: Negative for congestion and headaches.    Eyes: Negative for blurred vision, left vision loss and right vision loss.   Cardiovascular: Negative for chest pain and syncope.   Respiratory: Negative for cough and shortness of breath.    Endocrine: Negative for polydipsia, polyphagia and polyuria.   Hematologic/Lymphatic: Negative for bleeding problem. Does not bruise/bleed easily.   Skin: Negative for dry skin, itching and rash.   Musculoskeletal: Negative for falls. Positive for right knee pain and  muscle weakness.   Gastrointestinal: Negative for abdominal pain and bowel incontinence.   Genitourinary: Negative for bladder incontinence and nocturia.   Neurological: Negative for disturbances in coordination, loss of balance and seizures.   Psychiatric/Behavioral: Negative for depression. The patient does not have insomnia.    Allergic/Immunologic: Negative for hives and persistent infections.     PAST MEDICAL HISTORY:   No past medical history on file.    PAST SURGICAL HISTORY:   Past Surgical History:   Procedure Laterality Date    JACQUE PROCEDURE      TONSILLECTOMY         FAMILY HISTORY:   Family History   Problem Relation Age of Onset    Heart attack Paternal Grandfather     Heart disease Paternal Grandfather     Hypertension  Father        SOCIAL HISTORY:   Social History     Socioeconomic History    Marital status:    Tobacco Use    Smoking status: Never    Smokeless tobacco: Never   Substance and Sexual Activity    Alcohol use: Not Currently     Comment: rarely    Drug use: Not Currently     Types: Marijuana    Sexual activity: Yes     Partners: Female     Birth control/protection: Condom     Social Determinants of Health     Financial Resource Strain: Low Risk  (4/9/2024)    Overall Financial Resource Strain (CARDIA)     Difficulty of Paying Living Expenses: Not very hard   Food Insecurity: No Food Insecurity (4/9/2024)    Hunger Vital Sign     Worried About Running Out of Food in the Last Year: Never true     Ran Out of Food in the Last Year: Never true   Transportation Needs: No Transportation Needs (4/9/2024)    PRAPARE - Transportation     Lack of Transportation (Medical): No     Lack of Transportation (Non-Medical): No   Physical Activity: Sufficiently Active (4/9/2024)    Exercise Vital Sign     Days of Exercise per Week: 5 days     Minutes of Exercise per Session: 80 min   Stress: Stress Concern Present (4/9/2024)    [employer] - Occupational Stress Questionnaire     Feeling of Stress : To some extent   Social Connections: Unknown (4/9/2024)    Social Connection and Isolation Panel [NHANES]     Frequency of Communication with Friends and Family: More than three times a week     Frequency of Social Gatherings with Friends and Family: Twice a week     Active Member of Clubs or Organizations: No     Attends Club or Organization Meetings: Never     Marital Status:    Housing Stability: Low Risk  (4/9/2024)    Housing Stability Vital Sign     Unable to Pay for Housing in the Last Year: No     Number of Places Lived in the Last Year: 1     Unstable Housing in the Last Year: No       MEDICATIONS:   No current outpatient medications on file.    ALLERGIES:   Review of patient's allergies  "indicates:  No Known Allergies    VITAL SIGNS:   /67   Pulse 89   Ht 6' 3" (1.905 m)   Wt 110 kg (242 lb 8.1 oz)   BMI 30.31 kg/m²      PHYSICAL EXAMINATION:  General:  The patient is alert and oriented x 3.  Mood is pleasant.  Observation of ears, eyes and nose reveal no gross abnormalities.  No labored breathing observed.    bilateral KNEE EXAMINATION     OBSERVATION / INSPECTION   Gait:   Nonantalgic   Alignment:  Neutral   Scars:   None   Muscle atrophy: Mild  Effusion:  None   Warmth:  None   Discoloration:   none     TENDERNESS / CREPITUS (T / C):          T / C      T / C   Patella   - / -   Lateral joint line   - / -   Peripatellar medial  +(B)  Medial joint line    - / -   Peripatellar lateral -  Medial plica   - / -   Patellar tendon -   Popliteal fossa  - / -   Quad tendon   -   Gastrocnemius   -   Prepatellar Bursa - / -   Quadricep   -   Tibial tubercle  -  Thigh/hamstring  -   Pes anserine/HS -  Fibula    -   ITB   - / -  Tibia     -   Tib/fib joint  - / -  LCL    -     MFC   - / -   MCL: Proximal  -    LFC   - / -    Distal   -          ROM: (* = pain)  PASSIVE   ACTIVE    Left :   5 / 0 / 145   5 / 0 / 145     Right :    5 / 0 / 145   5 / 0 / 145    Patellofemoral examination:  See above noted areas of tenderness.   Patella position    Subluxation / dislocation: Centered           Sup. / Inf;   Normal   Crepitus (PF):    + bilaterally   Patellar Mobility:       Medial-lateral:   Normal    Superior-inferior:  Normal    Inferior tilt   Normal    Patellar tendon:  Normal   Lateral tilt:    +   J-sign:     None   Patellofemoral grind:   No pain       MENISCAL SIGNS:     Pain on terminal extension:  -  Pain on terminal flexion:  -  Royals maneuver:  - (for )  Squat     - (for )    LIGAMENT EXAMINATION:  ACL / Lachman:  normal (-1 to 2mm)    PCL-Post.  drawer: normal 0 to 2mm  MCL- Valgus:  normal 0 to 2mm  LCL- Varus:  normal 0 to 2mm  Pivot shift: normal (Equal)   Dial Test: difference " c/w other side   At 30° flexion: normal (< 5°)    At 90° flexion: normal (< 5°)   Reverse Pivot Shift:   normal (Equal)     STRENGTH: (* = with pain) PAINFUL SIDE   Quadricep   5/5   Hamstrin/5    EXTREMITY NEURO-VASCULAR EXAMINATION:   Sensation:  Grossly intact to light touch all dermatomal regions.   Motor Function:  Fully intact motor function at hip, knee, foot and ankle    DTRs;  quadriceps and  achilles 2+.  No clonus and downgoing Babinski.    Vascular status:  DP and PT pulses 2+, brisk capillary refill, symmetric.     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        ASSESSMENT:    bilatearl Knee Pain, Probable patellofemoral chondromalacia   1. Chondromalacia of patellofemoral joint, unspecified laterality    2. Pain in both knees, unspecified chronicity        PLAN:   1. Referral to Dr. Quintero for consultation for PFJ cartilage restoration procedure    All questions were answered, pt will contact us for questions or concerns in the interim.

## 2024-04-29 NOTE — PROGRESS NOTES
Subjective:          Chief Complaint: Ab Girard is a 28 y.o. male who had concerns including Pain of the Right Knee and Pain of the Left Knee.    Ab Girard comes to clinic as a new patient to us, referred by Dr. Sesay for consideration of patellofemoral cartilage procedure.   28 y.o. Male with a 1 year history of bilateral knee atraumatic pain.  Reports anterior knee pain worse with descending stairs or getting up from a seated position.  Patient works as a chemical .   Previously seen previously seen Dr. Lucero and had bilateral knee CSI and HA injections which he reports helped about 25-30%.  He states that the pain is severe and not responding to any conservative care.       He reports that the pain and weakness. It also bothers him at night.     Positive mechanical symptoms, denies instability     Is affecting ADLs.            Review of Systems   Constitutional: Negative for fever and night sweats.   HENT:  Negative for hearing loss.    Eyes:  Negative for blurred vision and visual disturbance.   Cardiovascular:  Negative for chest pain and leg swelling.   Respiratory:  Negative for shortness of breath.    Endocrine: Negative for polyuria.   Hematologic/Lymphatic: Negative for bleeding problem.   Skin:  Negative for rash.   Musculoskeletal:  Negative for back pain, joint pain, joint swelling, muscle cramps and muscle weakness.   Gastrointestinal:  Negative for melena.   Genitourinary:  Negative for hematuria.   Neurological:  Negative for loss of balance, numbness and paresthesias.   Psychiatric/Behavioral:  Negative for altered mental status.        Pain Related Questions  Over the past 3 days, what was your highest pain level?: 7  Over the past 3 days, what was your lowest pain level? : 7    Other  How many nights a week are you awakened by your affected body part?: 7  Was the patient's HEIGHT measured or patient reported?: Patient Reported  Was the patient's WEIGHT measured or  patient reported?: Measured      Objective:        General: Ab is well-developed, well-nourished, appears stated age, in no acute distress, alert and oriented to time, place and person.     General    Vitals reviewed.  Constitutional: He is oriented to person, place, and time. He appears well-developed and well-nourished. No distress.   HENT:   Mouth/Throat: No oropharyngeal exudate.   Eyes: Right eye exhibits no discharge. Left eye exhibits no discharge.   Pulmonary/Chest: Effort normal and breath sounds normal. No respiratory distress.   Neurological: He is alert and oriented to person, place, and time. He has normal reflexes. No cranial nerve deficit. Coordination normal.   Psychiatric: He has a normal mood and affect. His behavior is normal. Judgment and thought content normal.     General Musculoskeletal Exam   Gait: normal       Right Knee Exam     Inspection   Erythema: absent  Scars: absent  Swelling: absent  Effusion: absent  Deformity: absent  Bruising: absent    Tenderness   The patient is tender to palpation of the patella.    Crepitus   The patient has crepitus of the patella (moderate).    Range of Motion   Extension:  0   Flexion:  140     Tests   Meniscus   Royal:  Medial - negative Lateral - negative  Ligament Examination   Lachman: normal (-1 to 2mm)   PCL-Posterior Drawer: normal (0 to 2mm)     MCL - Valgus: normal (0 to 2mm)  LCL - Varus: normal  Pivot Shift: normal (Equal)  Reverse Pivot Shift: normal (Equal)  Dial Test at 30 degrees: normal (< 5 degrees)  Dial Test at 90 degrees: normal (< 5 degrees)  Posterior Sag Test: negative  Posterolateral Corner: stable  Patella   Patellar apprehension: negative  Passive Patellar Tilt: neutral and lateral tilt  Patellar Tracking: normal  Patellar Glide (quadrants): Lateral - 2   Medial - 2  Q-Angle at 90 degrees: abnormal  Patellar Grind: positive  J-Sign: none    Other   Meniscal Cyst: absent  Popliteal (Baker's) Cyst: absent  Sensation:  normal  Apley Grind Test: negative    Left Knee Exam     Inspection   Erythema: absent  Scars: absent  Swelling: absent  Effusion: absent  Deformity: absent  Bruising: absent    Tenderness   The patient tender to palpation of the patella.    Crepitus   The patient has crepitus of the patella (moderate).    Range of Motion   Extension:  0   Flexion:  140     Tests   Meniscus   Royal:  Medial - negative Lateral - negative  Stability   Lachman: normal (-1 to 2mm)   PCL-Posterior Drawer: normal (0 to 2mm)  MCL - Valgus: normal (0 to 2mm)  LCL - Varus: normal (0 to 2mm)  Pivot Shift: normal (Equal)  Reverse Pivot Shift: normal (Equal)  Dial Test at 30 degrees: normal (< 5 degrees)  Dial Test at 90 degrees: normal (< 5 degrees)  Posterior Sag Test: negative  Posterolateral Corner: stable  Patella   Patellar apprehension: negative  Passive Patellar Tilt: neutral and lateral tilt  Patellar Tracking: normal  Patellar Glide (Quadrants): Lateral - 2 Medial - 2  Q-Angle at 90 degrees: abnormal  Patellar Grind: positive  J-Sign: J sign absent    Other   Meniscal Cyst: absent  Popliteal (Baker's) Cyst: absent  Sensation: normal  Apley Grind Test: negative    Right Hip Exam     Tests   Maynor: negative  Left Hip Exam     Tests   Maynor: negative          Muscle Strength   Right Lower Extremity   Hip Abduction: 5/5   Quadriceps:  5/5   Hamstrin/5   Left Lower Extremity   Hip Abduction: 5/5   Quadriceps:  5/5   Hamstrin/5     Reflexes     Left Side  Achilles:  2+  Quadriceps:  2+    Right Side   Achilles:  2+  Quadriceps:  2+    Vascular Exam     Right Pulses  Dorsalis Pedis:      2+  Posterior Tibial:      2+        Left Pulses  Dorsalis Pedis:      2+  Posterior Tibial:      2+        External MRI report Right knee 2024:   Large knee joint effusion, synovitis and osteochondral intra-articular bodies  Longitudinal oblique micro tear of the peripheral posterior horn medial meniscus   Grade 4 patellofemoral  chondromalacia  Nodular areas of altered signal intensity in the distal femoral metaphysis may represent red marrow reconversion which can be seen certain anemias with enchondroma not completely excluded   Lateral patellar tilt/subluxation       External MRI report Left Knee 4/2/2024:   Longitudinal oblique tear posterior horn medial meniscus   Grade 4 patellofemoral chondromalacia  Osteo chondral defect/injury to the anterolateral femoral condyle subchondral bone marrow edema and sclerosis  Large knee joint effusion/synovitis/osteochondral intra-articular body   Lateral patellar tilt/subluxation               Assessment:       Encounter Diagnoses   Name Primary?    Knee pain, unspecified chronicity, unspecified laterality Yes    Chondromalacia of right patella     Patella, chondromalacia, left     Patellar malalignment syndrome of left knee     Patellar malalignment syndrome of right knee           Plan:       1. RTC in 4 weeks with [facility] Practice Provider preop. H&P; IKDC, SF-12 and KOOS was filled out today in clinic. Patient will not fill out IKDC, SF-12 and KOOS on return.    2. Medications: Refills of the following Rx were sent to patients preferred Pharmacy:  No Refills Needed Today    3. Physical Therapy: [facility] for PT       4. HEP: N/A    5. Procedures/Procedural Planning:   We reviewed with Ab today, the pathology and natural history of his diagnosis. We have discussed a variety of treatment options including medications, physical therapy and other alternative treatments. I also explained the indications, risks and benefits of surgery. After discussion, Ab decided to proceed with surgery. The decision was made to go forward with:     Right knee    83676 Osteochondral Allograft knee, open (medial femur and trochlea, patella) - Acumed screws  49713 Anterior tibial tubercleplasty (Kinamed system)  69522 Lateral retinacular release, open (lateral lengthening)  06903 Arthroscopy, debridement/shaving  of articular cartilage (Chondroplasty)      Left knee    79670 Osteochondral Allograft knee, open (medial femur and trochlea, patella) - Acumed screws  53045 Anterior tibial tubercleplasty (Kinamed system)  95764 Lateral retinacular release, open (lateral lengthening)  79096 Arthroscopy, debridement/shaving of articular cartilage (Chondroplasty)      The details of the surgical procedure were explained, including the location of probable incisions and a description of likely hardware and/or grafts to be used.  The patient understands the likely convalescence after surgery.  Also, we have thoroughly discussed the risks, benefits and alternatives to surgery, including, but not limited to, the risk of infection, joint stiffness, blood clot (including DVT and/or pulmonary embolus), neurologic and vascular injury.  It was explained that, if tissue has been repaired or reconstructed, there is a chance of failure, which may require further management.      All of the patient's questions were answered and informed consent was obtained. The patient will contact us if they have any questions or concerns in the interim.    6. DME:  [facility] patellofemoral  measured today    7. Work/Sport Status: No interval change    8. Visit Summary: Above plan, patient will contact our office when he is ready to proceed with right side first.                           [facility] patient questionnaires have been collected today.

## 2024-05-01 ENCOUNTER — PATIENT MESSAGE (OUTPATIENT)
Dept: SPORTS MEDICINE | Facility: CLINIC | Age: 29
End: 2024-05-01

## 2024-05-01 ENCOUNTER — HOSPITAL ENCOUNTER (OUTPATIENT)
Dept: RADIOLOGY | Facility: HOSPITAL | Age: 29
Discharge: HOME | End: 2024-05-01
Attending: ORTHOPAEDIC SURGERY
Payer: COMMERCIAL

## 2024-05-01 ENCOUNTER — OFFICE VISIT (OUTPATIENT)
Dept: SPORTS MEDICINE | Facility: CLINIC | Age: 29
End: 2024-05-01
Payer: COMMERCIAL

## 2024-05-01 VITALS
BODY MASS INDEX: 30.46 KG/M2 | DIASTOLIC BLOOD PRESSURE: 76 MMHG | HEART RATE: 74 BPM | HEIGHT: 75 IN | SYSTOLIC BLOOD PRESSURE: 140 MMHG | WEIGHT: 245 LBS

## 2024-05-01 DIAGNOSIS — M23.92 PATELLAR MALALIGNMENT SYNDROME OF LEFT KNEE: ICD-10-CM

## 2024-05-01 DIAGNOSIS — M23.91 PATELLAR MALALIGNMENT SYNDROME OF RIGHT KNEE: ICD-10-CM

## 2024-05-01 DIAGNOSIS — M22.42 PATELLA, CHONDROMALACIA, LEFT: ICD-10-CM

## 2024-05-01 DIAGNOSIS — M25.569 KNEE PAIN, UNSPECIFIED CHRONICITY, UNSPECIFIED LATERALITY: ICD-10-CM

## 2024-05-01 DIAGNOSIS — M25.569 KNEE PAIN, UNSPECIFIED CHRONICITY, UNSPECIFIED LATERALITY: Primary | ICD-10-CM

## 2024-05-01 DIAGNOSIS — M22.41 CHONDROMALACIA OF RIGHT PATELLA: ICD-10-CM

## 2024-05-01 PROCEDURE — 73560 X-RAY EXAM OF KNEE 1 OR 2: CPT | Mod: 26,LT,, | Performed by: RADIOLOGY

## 2024-05-01 PROCEDURE — 99215 OFFICE O/P EST HI 40 MIN: CPT | Mod: S$GLB,,, | Performed by: ORTHOPAEDIC SURGERY

## 2024-05-01 PROCEDURE — 73560 X-RAY EXAM OF KNEE 1 OR 2: CPT | Mod: TC,LT

## 2024-05-01 PROCEDURE — 77073 BONE LENGTH STUDIES: CPT | Mod: 26,,, | Performed by: RADIOLOGY

## 2024-05-01 PROCEDURE — 99999 PR PBB SHADOW E&M-EST. PATIENT-LVL IV: CPT | Mod: PBBFAC,,, | Performed by: ORTHOPAEDIC SURGERY

## 2024-05-01 PROCEDURE — 3077F SYST BP >= 140 MM HG: CPT | Mod: CPTII,S$GLB,, | Performed by: ORTHOPAEDIC SURGERY

## 2024-05-01 PROCEDURE — 3008F BODY MASS INDEX DOCD: CPT | Mod: CPTII,S$GLB,, | Performed by: ORTHOPAEDIC SURGERY

## 2024-05-01 PROCEDURE — 77073 BONE LENGTH STUDIES: CPT | Mod: TC

## 2024-05-01 PROCEDURE — 73560 X-RAY EXAM OF KNEE 1 OR 2: CPT | Mod: 26,RT,, | Performed by: RADIOLOGY

## 2024-05-01 PROCEDURE — 73564 X-RAY EXAM KNEE 4 OR MORE: CPT | Mod: TC,50

## 2024-05-01 PROCEDURE — 3078F DIAST BP <80 MM HG: CPT | Mod: CPTII,S$GLB,, | Performed by: ORTHOPAEDIC SURGERY

## 2024-05-01 PROCEDURE — 73564 X-RAY EXAM KNEE 4 OR MORE: CPT | Mod: 26,,, | Performed by: RADIOLOGY

## 2024-05-01 PROCEDURE — 73560 X-RAY EXAM OF KNEE 1 OR 2: CPT | Mod: TC,RT

## 2024-05-01 PROCEDURE — 1159F MED LIST DOCD IN RCRD: CPT | Mod: CPTII,S$GLB,, | Performed by: ORTHOPAEDIC SURGERY

## 2024-05-01 NOTE — PATIENT INSTRUCTIONS
The knee is the most frequently injured joint in the body. Most injuries are due to the extreme stresses during twisting or turning activities or sports. The knee joint is made up of three bones, four major ligaments and two types of cartilage.    FEMUR (Thigh Bone)  The femoral condyles are the two rounded prominences at the end of the femur. The motion of the condyles include rocking, gliding and rotating. Any abnormal surface structure or cartilage damage can lead to cartilage breakdown and arthritis (loss of cartilage padding).    TIBIA (Shin Bone)  The Tibia meets the Femur at the knee in two areas on which the Femur rides. This area is called the Tibial Plateau.    PATELLA (Knee Cap)  The Patella is a bone that lies within the quadriceps tendon. It rides in the shallow groove over the front part if the Femur called the Trochlea. The Patella acts as a lever arm to help the quadriceps muscle extend the knee.    Articular Cartilage covers the ends of these bones at the knee joint. This glistening white substance has the consistency of firm rubber but is actually a mixture of collagen and special large sponge-like molecules all maintained by living cartilage cells (chondrocytes). With normal joint fluid for lubrication, the surface is more slippery than ice on ice and allows smooth and easy knee joint motion.      MENISCUS  The other type of knee cartilage is the Meniscal Cartilage (fibrocartilage). These C-shaped pads are found between the thigh bone and shin bone -- one on each side -- thus called the Medial Meniscus (inner thigh aspect) and Lateral Meniscus (outer aspect). The menisci are attached to the Tibial Plateaus, and serve to cushion and transfer joint force more evenly to the tibia. They accomplish this by distributing joint forces over a larger area of the joint -- transferring force from the curved femoral condylar margins to the flatter tibial plateaus. Damage to the meniscal cushion may result  "in increased stress on the articular cartilage of the tibia and femur and early arthritis.      The smooth, white shiny covering of the bones in the joint is known as Articular Cartilage, and is made of a material called "hyaline cartilage". Damage to this articular cartilage can occur from trauma (such as a fall or car accident), but more often, it is the result of repetitive injuries over a long period of time.    Articular cartilage injuries are common, occurring in 20- 70% of knee injuries. The function of articular cartilage is to optimize joint function by reducing friction and increasing shock absorption. Articular cartilage is similar to the "tread on a car tire".    Injuries to the articular cartilage have a low capacity for healing. Both superficial and full-thickness cartilage injuries may progress to the mechanical wear and breakdown of the cartilage matrix.  The breakdown of articular cartilage will eventually cause osteoarthritis, which is a very serious painful condition. With a full-thickness cartilage injury, continued activity may cause the articular cartilage injury to progress rapidly to traumatic arthritis. The larger the initial cartilage injury, the faster the potential progression of arthritis. Articular cartilage injury or wear leads to joint pain.      Common symptoms include pain when the joint is moved or loaded (like walking or running). Catching, locking, or creaking (crepitation) may occur with joint motion or weight bearing (like twisting or standing  from a seated position). Articular cartilage damage may be superficial (partial), deep (complete full-thickness), or osteochondral (bone and cartilage).    Superficial cartilage injuries extend into the upper 50% of the depth of the cartilage. These injuries typically do not heal, but may not progress to arthritis unless they are large and located in a weight-bearing area of the knee.  Deep or full-thickness lesions extend down to the " "bone, but not through it. These injuries have very little healing potential and tend to progress to osteoarthritis if located in a weight-bearing area.    Osteochondral (bone plus cartilage) injuries extend down through the subchondral bone (deep to the cartilage). These injuries may heal by allowing blood vessel ingrowth with fibrous cells to produce a fibrous (scar-like) tissue repair.      Treatment Options For Smaller Defects  Most studies suggest the repair tissue formed from bone marrow stimulation techniques is fibrocartilage (scar tissue -not hyaline cartilage), which is less resistant to wear with the forces in the knee joint and often deteriorates over time Bone marrow stimulation techniques can be successful in eliminating symptoms in many patients, especially young patients with small lesions.   Without early intervention, cartilage degeneration may proceed, and prevent a chance for successful pain- free function.    Arthroscopic Debridement  This is an arthroscopic procedure, often known as a "knee scope". The surgeon uses minimally invasive techniques with a small fiberoptic light source attached to a video camera to locate damaged cartilage and trim the loose edges away to smooth the surface. The surgeon may trim meniscus tears and remove loose cartilage that causes catching or locking symptoms and attempts to prevent any further flaking off that can irritate the knee joint lining and cause swelling.  Arthroscopic debridement is most effective for small lesions, less than 1 cm2 (3/8 inch). It is not as effective for larger lesions because it does not fill the lesion with any repair tissue, leaving the edges exposed to high forces in the knee and continued wear. Despite relieving some symptoms from cartilage tears or loss, arthroscopic cleaning does not prevent the progression of arthritis, but may relieve mechanical symptoms.    Bone Marrow Stimulation Techniques  Three different techniques are " "available to create bleeding and clot formation at the cartilage defect. Blood vessels and fibrous cells migrate to the area to form a fibrous scar-tissue repair tissue to fill the hole in the articular cartilage. Drilling creates multiple small holes through the subchondral bone to allow bleeding into the defect.   Microfracture or "picking" creates small fractures in the subchondral bone to encourage bleeding into the defect.      Abrasion arthroplasty is a superficial shaving of the subchondral bone surface to create bleeding into the defect. Bone marrow stimulation techniques are successful in eliminating symptoms in many patients, especially younger patients with smaller lesions well contained lesions.       For patients with more extensive cartilage damage there are several techniques available    Osteochondral Autograft  This technique is analogous to a hair-plug transfer. The surgeon removes a small section of the patient's own cartilage along with the underlying bone plug, hence the name osteochondral (bone and cartilage) graft.      Bone and cartilage cylinders are harvested from a minor weight bearing area of the knee joint and transplanted into prepared holes in the damage area. This process works much like a hair transplant. Unfortunately, a limited amount of normal osteochondral tissue is available for harvest. The typical size of defect treatable with this method is between 1 to 2 cm2.      Treatment Options For Larger Lesions    Autologous Chondrocyte Implantation (ACI) Carticel  For cartilage defects greater than 2 square centimeters or if there are multiple defects in the knee, one of the newer techniques for the cartilage regeneration, is ACI. ACI is FDA indicated for full-thickness cartilage or osteochondral lesions located in the knee.    ACI is performed in two stages. The first stage is performed when initially assessing the joint arthroscopically. A small amount of cartilage is harvested and " sent to a laboratory for cell culture and growth.    The patient's own cartilage cells (chondrocytes) are grown in culture increasing the number of cells by 10 fold.    Twelve million chondrocytes are then re-implanted into the cartilage defect and covered with a ceiling of native tissue (periosteum).    The cells then grow to fill the defect and resurface areas of cartilage loss with hyaline-like cartilage.    The long-term outcomes (of 14 years) are encouraging for treating medium and large cartilage lesions. ACI is the only procedure with a formal patient outcomes registry.    Osteochondral Allograft     For larger defects that involve both cartilage and bone loss, surgeons may custom fit the defect with a cadaver implant of donated cartilage and bone. The transplanted tissue is matched to the patient based on size of the knee, but tissue typing (similar to organ transplantation) is not necessary. Osteochondral transplantation may allow restoration of the joint surface. The long-term outcomes with fresh allograft (cadaver) tissue have been very encouraging.      RESTORING THE MENISCUS  Our goal is to maintain normal anatomy, if possible. In the case of meniscal tears, the first line of treatment is attempt at repair. Historically, in the days of open cartilage surgery, the entire meniscus was removed. Unfortunately, long term follow-up studies of these patients after removal of the meniscus have found that many go on to degenerative arthritis. Today, cartilage surgeons recognize the protective value of the meniscal cartilage and make every attempt to conserve this valuable tissue.    To maximally preserve function after a meniscus tear, surgeons may repair the meniscus using a variety of techniques. Options include using special sutures or absorbable implants to staple, anurag, or otherwise fix and secure the torn meniscal cushion.    Replacing The Meniscus  For patients who have had the meniscus removed, an  innovative solution called a meniscal transplant may be an option. The indications for transplant need to be assessed by your cartilage surgeon. Unlike some other forms of tissue transplantation, this procedure does not require patients to be on medications to prevent organ rejection. Intermediate term outcome studies are encouraging.        RESTORING KNEE ALIGNMENT    Osteotomy  When the bones of the knee do not align properly, joint forces are not evenly distributed and may overload one side of the knee causing pain and cartilage degeneration. This overload problem is made worse when there has been a traumatic cartilage injury or the meniscus has been removed.    An osteotomy is designed to shift the stress from the damaged part of the knee to a more normal area in the knee. An osteotomy requires the surgeon to cut the bone in order to remove a wedge of bone in order to adjust the angle of the knee. For individuals with medial compartment narrowing (the most common situation), there are three options for high tibial osteotomy (HTO).      One involves removing a wedge shape piece of bone from the lateral side of the tibia and then closing the remaining surfaces together and holding it with a plate and screws (Fig A).    The second technique involves making one cut partially across the top of the tibia and opening the bone to establish the correct alignment. This is held in place with a plate and screws and a bone graft. (Fig B)    The final technique is called medial hemicallotasis, which means stretching healing bone. This technique requires a single cut partially across the bone. The bone is then gradually opened on the medial side by an external fixation frame. This technique is attractive because it allows adjustments to be made in the angle of correction and the hardware is removed three months after the procedure (Fig C).      Each of these techniques require a period of non-weightbearing or partial  "weightbearing crutch ambulation. These techniques may be necessary at the same time or prior to other reconstructive procedures such as cartilage replacement or meniscus replacement surgery in order to remove excessive forces off the repair site.      OSTEOARTHRITIS    Partial (Unicompartmental) Joint Replacement  This procedure replaces only the damaged portion of the joint with metal and/or plastic, leaving the remaining portion of the joint intact. It is often known as a partial knee replacement. New techniques allow replacement of a portion of the knee joint through smaller incisions with fewer days of hospitalization required.    Total Joint Replacement  If there is extensive damage with bone-on-bone apposition, many of the above procedures are not indicated. In these cases of end-stage arthritis, the entire joint surface is replaced with artificial metal and plastic components, allowing most patients to return to pain-free activities.    Future Trends  Investigators are now examining the potential of using collagen or other biologic tissues to serve as a bridge or scaffold for the body's own healing/repair mechanism to use in reestablishing meniscal form and function. In the future, biological "healing glues" may become available to allow repair without sutures. Even with the newest techniques available, certain tears are not repairable and a portion of the meniscus is removed using the arthroscope (partial meniscectomy).    The term osteoarthritis indicates the degeneration and excessive wear of articular cartilage with gradual changes occurring in the underlying bone.    Initially the articular cartilage softens, the surface becomes uneven and the cartilage frays and develops cracks, which can extend down to bone.  In advanced osteoarthritis the cartilage is worn away to reveal the underlying bone and nerve endings causing pain.  The bone hardens, cysts begin to form, and new cartilage cells laid down around " the worn cartilage ossify and form bony projections (bone spurs).  Untreated articular cartilage defects can progress to osteoarthritis with both mechanical and enzymatic breakdown resulting in permanent dysfunction of the joint. Our goal is to diagnose and prevent or halt the progression of arthritis      Many patients suffer with end-stage arthritis that limits even the simplest activities of daily living, significantly altering the patient's quality of fife. For these patient's, current cartilage surgical options DO NOT apply. There are no curative therapies for end-stage arthritis. A wide range of methods may be used to relieve pain and restore function. Conventional treatment methods include exercise, physical therapy and medications, such as anti-inflammatories. Recently, new biological compounds have been shown to be effective and safe for treating arthritis. These compounds include lubricants (hyaluronic acid) and building blocks of cartilage (Chondroitin Sulfate and Glucosamine).    Medications  Oral medications such as non-steroidal anti-inflammatories (NSAID's) tend to have a beneficial effect on the degenerative conditions described above. Immediately following an injury, these medications help to decrease pain and swelling. On a more long term nature, these medications help to relieve the pain and swelling associated with arthritic joints. Newer specific anti-inflammatories medications have been developed to block the enzymes necessary for production of inflammation (cyclooxygenase-2 or BRINK-2). These medications, such as Ceibrex or Bextra tend to have less adverse effects on the stomach and gastrointestinal tract than older, more traditional NSAID's. These prescription-strength NSAID medications are taken by mouth once or twice per day. Other non-prescription over-the-counter NSAID's are available.    Cartilage Supplements  Other oral medications which can be purchased without a prescription include  Glucosamine and Chondroitin Sulfate. These two compounds are normally found in the substance of articular cartilage. Several recent studies using Cosamin®DS have demonstrated a pain relieving effect with the combination of Glucosamine Hydrochloride, highly purified low molecular weight Chondroitin Sulfate and Manganese Ascorbate available only in this product. The National Institutes of Health ([facility]) has selected the exact same Glucosamine and Chondroitin Sulfate used in CosaminDS for a large multi-center clinical trial currently in progress.      Oral administration of these compounds does not have a cartilage building effect, but rather a cartilage sparing effect. Laboratory studies have confirmed a stimulatory action on cartilage cells as well as an inhibition of cartilage degeneration with CosaminDS, which can improve the health of existing cartilage. Few negative side-effects have been demonstrated in patients taking these compounds, and many patients with arthritis benefit from the addition of this supplement to their arthritis treatment regimen.    Cortisone (Steroid) Injections  Injection of steroids into joints have been performed for well over 100 years with good results. Typically, steroid injection is utilized in a patient with degenerative arthritic changes to treat acute inflammation.  Steroids are powerful anti-inflammatory substances. When injected into a joint, the steroid has primarily a local effect, not a whole-body or systemic effect. These medications tend to decrease pain and inflammation when used appropriately. If overused, local steroid injection can have a negative effect on the tissues, such as weakening of bone and tendons. These injections typically are not permanent in their beneficial effect.    Injectable Viscosupplementation  The space between the cartilage surfaces in the knee joint contains synovial fluid that acts as a lubricant for the joint. With the progression of arthritis,  "the synovial fluid becomes thinner and is ineffective as a shock absorber. As a result simple movements become painful. Hyaluronic acid injections supplement the existing synovial fluid and act as a shock absorber and lubricant for the knee.      Synvisc is a hyluronan based, naturally occurring lubricant with similar properties to synovial fluid found in healthy joints. Synvisc or Euflexxa are injected over a 3 week series to provide lubrication to the knee joint. For some patients, Synvisc One may be an option, this is a single-shot injection.    Braces  In some cases of arthritis, only a portion of the knee is degenerative and it may be advantageous to "unload" the affected area and force more weight towards the "good" part of the knee. Special braces called "unloaders" are used for this purpose. Typically the brace is worn for work or activity and tends to decrease the pain caused by single compartment arthritis.        Physical Therapy  Exercise is a vital component of the treatment of cartilage injury. If the knee becomes stiff after an injury or over the course of time, it may be difficult to regain the lost motion. In most cases, early range of motion exercises are instituted in order to prevent this problem. In some cases, a loss of strength in certain muscle groups can lead to increased stress on the already degenerative articular surfaces. If muscles are strong and working properly they will take up some of the stress and divert it away from the cartilage surfaces. As symptoms decrease exercise is increased, but always below the pain threshold. Muscle strength is never harmful to a joint. It is therefore common to have a doctor prescribe strengthening exercises as an integral part of the treatment program for arthritis.    TECHNIQUES  Biologic tissue engineering is continuing to bring exciting new approaches from the lab to the clinical arena. It may be possible to induce primitive cells from the bone " "marrow called pluripotential cells or mesenchymal stem cells to transform into hyaline articular cartilage with the use of a variety of growth factors or hormones. Genetic reprogram¬jasmyne and tissue engineering may eventually replace surgery - but not at this stage in the new millennium.  Other biological patches may act as a temporary home for chondrocytes or "prechondrocytes." This exciting field will offer many new surgical techniques, which will hopefully lead to opportunities to restore function with less invasive means.   "

## 2024-05-08 ENCOUNTER — TELEPHONE (OUTPATIENT)
Dept: SPORTS MEDICINE | Facility: CLINIC | Age: 29
End: 2024-05-08
Payer: COMMERCIAL

## 2024-05-08 NOTE — LETTER
Patient: Ab Girard   YOB: 1995   Clinic Number: [id number]   Today's Date: May 8, 2024        Certificate to Return to Work     Ab is scheduled for right knee surgery on June 27, 2024. Ab is permitted to return to sedentary and remote working conditions pending his recovery once he is seen by our office at his two week postoperative visit. We anticipate this visit to take place on or around 7/10/2024.     If you have any questions or concerns, please feel free to contact the office at [phone].    Thank you.    Dr. Caroline Quintero         Signature: _ _________________________________________________

## 2024-05-08 NOTE — TELEPHONE ENCOUNTER
"Spoke with patient and clarified some postop questions for him. Patient would like to return to remote work after 2 week postop pending his recovery. Will get this typed into a letter for him and sent through the portal.     Alexa   Clinical & Surgical Assistant to Dr. Caroline Quintero     "PT'S CALLIGN REGARDING SURGERY QUESTIONS FOR JUNE Boutte, [facility] [employer] Staff  Caller: PT (Today, 10:04 AM)  Confirmed contact info below:  Contact Name: Ab Girard  Phone Number: [phone]"  "

## 2024-05-09 ENCOUNTER — HOSPITAL ENCOUNTER (OUTPATIENT)
Dept: RADIOLOGY | Facility: HOSPITAL | Age: 29
Discharge: HOME | End: 2024-05-09
Attending: ORTHOPAEDIC SURGERY
Payer: COMMERCIAL

## 2024-05-09 DIAGNOSIS — M22.41 CHONDROMALACIA OF RIGHT PATELLA: ICD-10-CM

## 2024-05-09 DIAGNOSIS — M22.42 PATELLA, CHONDROMALACIA, LEFT: ICD-10-CM

## 2024-05-09 PROCEDURE — 73700 CT LOWER EXTREMITY W/O DYE: CPT | Mod: TC,RT

## 2024-05-09 PROCEDURE — 73700 CT LOWER EXTREMITY W/O DYE: CPT | Mod: TC,LT

## 2024-05-09 PROCEDURE — 73700 CT LOWER EXTREMITY W/O DYE: CPT | Mod: 26,RT,, | Performed by: RADIOLOGY

## 2024-05-09 PROCEDURE — 73700 CT LOWER EXTREMITY W/O DYE: CPT | Mod: 26,LT,, | Performed by: RADIOLOGY

## 2024-05-13 ENCOUNTER — OFFICE VISIT (OUTPATIENT)
Dept: SPORTS MEDICINE | Facility: CLINIC | Age: 29
End: 2024-05-13
Payer: COMMERCIAL

## 2024-05-13 DIAGNOSIS — M23.92 PATELLAR MALALIGNMENT SYNDROME OF LEFT KNEE: ICD-10-CM

## 2024-05-13 DIAGNOSIS — M22.40 CHONDROMALACIA OF PATELLOFEMORAL JOINT, UNSPECIFIED LATERALITY: ICD-10-CM

## 2024-05-13 DIAGNOSIS — M25.562 PAIN IN BOTH KNEES, UNSPECIFIED CHRONICITY: ICD-10-CM

## 2024-05-13 DIAGNOSIS — G89.29 CHRONIC PAIN OF RIGHT KNEE: ICD-10-CM

## 2024-05-13 DIAGNOSIS — M23.91 PATELLAR MALALIGNMENT SYNDROME OF RIGHT KNEE: ICD-10-CM

## 2024-05-13 DIAGNOSIS — M25.561 CHRONIC PAIN OF RIGHT KNEE: ICD-10-CM

## 2024-05-13 DIAGNOSIS — M25.561 PAIN IN BOTH KNEES, UNSPECIFIED CHRONICITY: ICD-10-CM

## 2024-05-13 DIAGNOSIS — M22.42 PATELLA, CHONDROMALACIA, LEFT: Primary | ICD-10-CM

## 2024-05-13 DIAGNOSIS — M22.41 CHONDROMALACIA OF RIGHT PATELLA: ICD-10-CM

## 2024-05-13 PROCEDURE — 99214 OFFICE O/P EST MOD 30 MIN: CPT | Mod: 95,,, | Performed by: ORTHOPAEDIC SURGERY

## 2024-05-13 NOTE — PROGRESS NOTES
Telemedicine/Virtual Visit Documentation:     The patient location is: home    The chief complaint leading to consultation is: see HPI    VISIT TYPE X   Virtual visit with synchronous audio and video    Telephone E/M service      Total time spent with patient: see X chantal on chart below.   More than half of the time was spent counseling or coordinating care including prognosis, differential diagnosis, risks and benefits of treatment, instructions, compliance risk reductions     EST MINUTES X   05212 5    12892 10    13215 15    38640 25 X   99215 40    NEW     67156 10    89791 20    03094 30    91011 45    34124 60    PHONE      5-10    53143 11-20    99371 21-30      H&P  Orthopaedics      SUBJECTIVE:     History of Present Illness:  Patient is a 28 y.o. male with Bilateral knee lateral patellar tracking and patellar and femoral chondromalacia. CT and MRI results present today.    Review of patient's allergies indicates:  No Known Allergies    No past medical history on file.  Past Surgical History:   Procedure Laterality Date    JACQUE PROCEDURE      TONSILLECTOMY       Family History   Problem Relation Name Age of Onset    Heart attack Paternal Grandfather      Heart disease Paternal Grandfather      Hypertension Father       Social History     Tobacco Use    Smoking status: Never    Smokeless tobacco: Never   Substance Use Topics    Alcohol use: Not Currently     Comment: rarely    Drug use: Not Currently     Types: Marijuana        Review of Systems:  Patient denies constitutional symptoms, cardiac symptoms, respiratory symptoms, GI symptoms.  The remainder of the musculoskeletal ROS is included in the HPI.      OBJECTIVE:     Physical Exam:  Gen:  No acute distress  CV:  Peripherally well-perfused.  Pulses 2+ bilaterally.  Lungs:  Normal respiratory effort.  Abdomen:  Soft, non-tender, non-distended  Head/Neck:  Normocephalic.  Atraumatic. No TTP, AROM and PROM intact without pain  Neuro:  CN intact without  deficit, SILT throughout B/L Upper & Lower Extremities    MSK:  No interval change    CT Knee Without Contrast Left  Narrative: EXAMINATION:  CT KNEE WITHOUT CONTRAST RIGHT; CT KNEE WITHOUT CONTRAST LEFT    CLINICAL HISTORY:  Knee pain, chronic, positive xray (Age >= 5y);TT-TG distance and morphology;  Chondromalacia patellae, right knee; Chondromalacia patellae, left knee    TECHNIQUE:  CT right and left knee performed without contrast.  Coronal and sagittal reformats provided.    COMPARISON:  Radiographs 05/01/2024    FINDINGS:  No acute fracture or dislocation.  No lytic or blastic lesion.    Tricompartmental osteophytes are present bilaterally.    There is a small joint effusion bilaterally.  No ossified loose body seen.    Extensor mechanism is maintained.  ACL and PCL are unremarkable.  Muscle bulk is maintained.    Tibial tubercle trochlear groove distance measures 2.1 cm bilaterally.  Impression: 1. Tricompartmental osteophyte production.  2. Tibial tubercle trochlear groove distance measures 2.1 cm bilaterally.    Electronically signed by: Emilio Hays MD  Date:    05/09/2024  Time:    11:46  CT Knee Without Contrast Right  Narrative: EXAMINATION:  CT KNEE WITHOUT CONTRAST RIGHT; CT KNEE WITHOUT CONTRAST LEFT    CLINICAL HISTORY:  Knee pain, chronic, positive xray (Age >= 5y);TT-TG distance and morphology;  Chondromalacia patellae, right knee; Chondromalacia patellae, left knee    TECHNIQUE:  CT right and left knee performed without contrast.  Coronal and sagittal reformats provided.    COMPARISON:  Radiographs 05/01/2024    FINDINGS:  No acute fracture or dislocation.  No lytic or blastic lesion.    Tricompartmental osteophytes are present bilaterally.    There is a small joint effusion bilaterally.  No ossified loose body seen.    Extensor mechanism is maintained.  ACL and PCL are unremarkable.  Muscle bulk is maintained.    Tibial tubercle trochlear groove distance measures 2.1 cm  bilaterally.  Impression: 1. Tricompartmental osteophyte production.  2. Tibial tubercle trochlear groove distance measures 2.1 cm bilaterally.    Electronically signed by: Emilio Hays MD  Date:    05/09/2024  Time:    11:46      ASSESSMENT/PLAN:     A/P: Ab Girard is a 28 y.o. bilateral knee increased TT-TG distances with patellofemoral and medial compartment wear bilaterally.    Plan:  1. RTC in 2 weeks with Advanced Practice Provider. IKDC, SF-12 and KOOS was not filled out today in clinic. Patient will not fill out IKDC, SF-12 and KOOS on return.    2. Medications: Refills of the following Rx were sent to patients preferred Pharmacy:  No Refills Needed Today    3. Physical Therapy: Continue/Begin: Pre-Rehabilitation at [facility]    4. HEP: N/A    5. Procedures/Procedural Planning:   We reviewed with Ab today, the pathology and natural history of his diagnosis. We have discussed a variety of treatment options including medications, physical therapy and other alternative treatments. I also explained the indications, risks and benefits of surgery. After discussion, Ab decided to proceed with surgery. The decision was made to go forward with:   Right knee     1. 06753 Osteochondral Allograft knee, open (medial femur and trochlea, patella) - Acumed screws  2. 94895 Anterior tibial tubercleplasty (Kinamed system)  3. 59960 Lateral retinacular release, open (lateral lengthening)  4. 62640 Arthroscopy, debridement/shaving of articular cartilage (Chondroplasty)      3-6 MONTHS FOLLOWING ABOVE PROCEDURE  Left knee     5. 66889 Osteochondral Allograft knee, open (medial femur and trochlea, patella) - Acumed screws  6. 44072 Anterior tibial tubercleplasty (Kinamed system)  7. 39176 Lateral retinacular release, open (lateral lengthening)  8. 72474 Arthroscopy, debridement/shaving of articular cartilage (Chondroplasty)    The details of the surgical procedure were explained, including the location of  probable incisions and a description of likely hardware and/or grafts to be used.  The patient understands the likely convalescence after surgery.  Also, we have thoroughly discussed the risks, benefits and alternatives to surgery, including, but not limited to, the risk of infection, joint stiffness, blood clot (including DVT and/or pulmonary embolus), neurologic and vascular injury.  It was explained that, if tissue has been repaired or reconstructed, there is a chance of failure, which may require further management.      All of the patient's questions were answered and informed consent was obtained. The patient will contact us if they have any questions or concerns in the interim.    6. DME: N/A    7. Work/Sport Status: will need to take 2-4 weeks off after surgery    8. Visit Summary: Discussed above plan with surgical date set for June 27,2024.

## 2024-05-17 DIAGNOSIS — M94.261 CHONDROMALACIA OF RIGHT KNEE: Primary | ICD-10-CM

## 2024-05-17 DIAGNOSIS — M22.41 CHONDROMALACIA OF RIGHT PATELLA: ICD-10-CM

## 2024-05-17 DIAGNOSIS — M25.361 PATELLAR INSTABILITY OF RIGHT KNEE: ICD-10-CM

## 2024-05-17 DIAGNOSIS — M23.91 INTERNAL DERANGEMENT OF RIGHT KNEE: ICD-10-CM

## 2024-06-21 ENCOUNTER — OFFICE VISIT (OUTPATIENT)
Dept: SPORTS MEDICINE | Facility: CLINIC | Age: 29
End: 2024-06-21
Payer: COMMERCIAL

## 2024-06-21 VITALS
WEIGHT: 230 LBS | SYSTOLIC BLOOD PRESSURE: 115 MMHG | HEIGHT: 75 IN | BODY MASS INDEX: 28.6 KG/M2 | HEART RATE: 67 BPM | DIASTOLIC BLOOD PRESSURE: 69 MMHG

## 2024-06-21 DIAGNOSIS — M23.91 INTERNAL DERANGEMENT OF RIGHT KNEE: ICD-10-CM

## 2024-06-21 DIAGNOSIS — M25.361 PATELLAR INSTABILITY OF RIGHT KNEE: ICD-10-CM

## 2024-06-21 DIAGNOSIS — M94.261 CHONDROMALACIA OF RIGHT KNEE: Primary | ICD-10-CM

## 2024-06-21 PROCEDURE — 99999 PR PBB SHADOW E&M-EST. PATIENT-LVL III: CPT | Mod: PBBFAC,,, | Performed by: PHYSICIAN ASSISTANT

## 2024-06-21 RX ORDER — MUPIROCIN 20 MG/G
OINTMENT TOPICAL
OUTPATIENT
Start: 2024-06-21

## 2024-06-21 RX ORDER — OXYCODONE HYDROCHLORIDE 5 MG/1
TABLET ORAL
Qty: 28 TABLET | Refills: 0 | Status: SHIPPED | OUTPATIENT
Start: 2024-06-21

## 2024-06-21 RX ORDER — SODIUM CHLORIDE 0.9 % (FLUSH) 0.9 %
10 SYRINGE (ML) INJECTION CONTINUOUS
OUTPATIENT
Start: 2024-06-21

## 2024-06-21 RX ORDER — CELECOXIB 200 MG/1
200 CAPSULE ORAL 2 TIMES DAILY WITH MEALS
Qty: 60 CAPSULE | Refills: 0 | Status: SHIPPED | OUTPATIENT
Start: 2024-06-21

## 2024-06-21 RX ORDER — ASPIRIN 325 MG
TABLET ORAL
Qty: 42 TABLET | Refills: 0 | Status: SHIPPED | OUTPATIENT
Start: 2024-06-21

## 2024-06-21 RX ORDER — METHOCARBAMOL 500 MG/1
500 TABLET, FILM COATED ORAL 3 TIMES DAILY PRN
Qty: 40 TABLET | Refills: 0 | Status: SHIPPED | OUTPATIENT
Start: 2024-06-21

## 2024-06-21 RX ORDER — CEFAZOLIN SODIUM 2 G/50ML
2 SOLUTION INTRAVENOUS
OUTPATIENT
Start: 2024-06-21

## 2024-06-21 RX ORDER — PREGABALIN 75 MG/1
75 CAPSULE ORAL 2 TIMES DAILY
Qty: 60 CAPSULE | Refills: 0 | Status: SHIPPED | OUTPATIENT
Start: 2024-06-21

## 2024-06-21 RX ORDER — ROPIVACAINE/EPI/CLONIDINE/KET 2.46-0.005
SYRINGE (ML) INJECTION ONCE
OUTPATIENT
Start: 2024-06-21 | End: 2024-06-21

## 2024-06-21 RX ORDER — PROMETHAZINE HYDROCHLORIDE 25 MG/1
25 TABLET ORAL EVERY 4 HOURS PRN
Qty: 30 TABLET | Refills: 0 | Status: SHIPPED | OUTPATIENT
Start: 2024-06-21

## 2024-06-21 NOTE — H&P (VIEW-ONLY)
Ab Girard  is here for a completion of his perioperative paperwork. he  Is scheduled to undergo Right knee     1. 27415 Osteochondral Allograft knee, open (medial femur and trochlea, patella) - Acumed screws  2. 27418 Anterior tibial tubercleplasty (Kinamed system)  3. 27425 Lateral retinacular release, open (lateral lengthening)  4. 31765 Arthroscopy, debridement/shaving of articular cartilage (Chondroplasty) on 6/27/2024.      He is a healthy individual and does not need clearance for this procedure.     Risks, indications and benefits of the surgical procedure were discussed with the patient. All questions with regard to surgery, rehab, expected return to functional activities, activities of daily living and recreational endeavors were answered to his satisfaction.    Discussed COVID-19 with the patient, they are aware of our current policies and procedures, were given the option of delaying surgery, and they elect to proceed.    Patient was informed and understands the risks of surgery are greater for patients with a current condition or history of heart disease, obesity, clotting disorders, recurrent infections, steroid use, current or past smoking, and factors such as sedentary lifestyle and noncompliance with medications, therapy or follow-up. The degree of the increased risk is hard to estimate with any degree of precision.    Once no other questions were asked, a brief history and physical exam was then performed.    PAST MEDICAL HISTORY: History reviewed. No pertinent past medical history.  PAST SURGICAL HISTORY:   Past Surgical History:   Procedure Laterality Date    JACQUE PROCEDURE      TONSILLECTOMY       FAMILY HISTORY:   Family History   Problem Relation Name Age of Onset    Heart attack Paternal Grandfather      Heart disease Paternal Grandfather      Hypertension Father       SOCIAL HISTORY:   Social History     Socioeconomic History    Marital status:    Tobacco Use    Smoking status: Never     Smokeless tobacco: Never   Substance and Sexual Activity    Alcohol use: Not Currently     Comment: rarely    Drug use: Not Currently     Types: Marijuana    Sexual activity: Yes     Partners: Female     Birth control/protection: Condom     Social Determinants of Health     Financial Resource Strain: Low Risk  (4/9/2024)    Overall Financial Resource Strain (CARDIA)     Difficulty of Paying Living Expenses: Not very hard   Food Insecurity: No Food Insecurity (4/9/2024)    Hunger Vital Sign     Worried About Running Out of Food in the Last Year: Never true     Ran Out of Food in the Last Year: Never true   Transportation Needs: No Transportation Needs (4/9/2024)    PRAPARE - Transportation     Lack of Transportation (Medical): No     Lack of Transportation (Non-Medical): No   Physical Activity: Sufficiently Active (4/9/2024)    Exercise Vital Sign     Days of Exercise per Week: 5 days     Minutes of Exercise per Session: 80 min   Stress: Stress Concern Present (4/9/2024)    [employer] [place] [employer] - Occupational Stress Questionnaire     Feeling of Stress : To some extent   Housing Stability: Low Risk  (4/9/2024)    Housing Stability Vital Sign     Unable to Pay for Housing in the Last Year: No     Number of Places Lived in the Last Year: 1     Unstable Housing in the Last Year: No       MEDICATIONS: No current outpatient medications on file.  ALLERGIES: Review of patient's allergies indicates:  No Known Allergies    Review of Systems   Constitution: Negative. Negative for chills, fever and night sweats.   HENT: Negative for congestion and headaches.    Eyes: Negative for blurred vision, left vision loss and right vision loss.   Cardiovascular: Negative for chest pain and syncope.   Respiratory: Negative for cough and shortness of breath.    Endocrine: Negative for polydipsia, polyphagia and polyuria.   Hematologic/Lymphatic: Negative for bleeding problem. Does not bruise/bleed easily.   Skin:  Negative for dry skin, itching and rash.   Musculoskeletal: Negative for falls and muscle weakness.   Gastrointestinal: Negative for abdominal pain and bowel incontinence.   Genitourinary: Negative for bladder incontinence and nocturia.   Neurological: Negative for disturbances in coordination, loss of balance and seizures.   Psychiatric/Behavioral: Negative for depression. The patient does not have insomnia.    Allergic/Immunologic: Negative for hives and persistent infections.     PHYSICAL EXAM:  GEN: A&Ox3, WD WN NAD  HEENT: WNL  CHEST: CTAB, no W/R/R  HEART: RRR, no M/R/G   ABD: Soft, NT ND, BS x4 QUADS  MS: Refer to previous note for detailed MS exam  NEURO: CN II-XII intact       The surgical consent was then reviewed with the patient, who agreed with all the contents of the consent form and it was signed. he was instructed to wait for a phone call from the anesthesia department prior to surgery to discuss past medical history, medications, and clearance. Also, informed he may be required to get additional testing per the anesthesia department prior to having surgery.     PHYSICAL THERAPY:  He was also instructed regarding physical therapy and will begin POD # 1-3. He is doing physical therapy at [facility].    POST OP CARE:instructions were reviewed including care of the wound and dressing after surgery and when he can shower.     PAIN MANAGEMENT: Ab Girard was also given a pain management regime, which includes the TENS unit given to him by our DME team, along with the education required for its use. He was also instructed regarding the ice wrap that will be in place after surgery and his postoperative pain medications.     PAIN MEDICATION:  Roxicodone 5mg 1-2 po q 4-6 hours prn pain  Phenergan 25 mg one p.o. q.4-6 hours p.r.n. nausea and vomiting.  Celebrex 200 mg BID  Robaxin 500mg TID PRN  Lyrica 75mg BID  Aspirin 325mg daily x 6 weeks for DVT prophylaxis starting on the  evening after surgery.    Post op meds to be delivered bedside prior to discharge. Deliver to family if patient is in surgery at 5pm.     Patient was instructed not to take benzodiazepines with opioid and Lyrica during the perioperative period. Patient denies history of seizures.     Patient will also use bilateral TEDs on lower extremities, SCDs during surgery, and early ambulation post-op. If the patient was previously taking 81mg baby aspirin, they may have been told not to hold for procedure.     Patient was also told to buy over the counter Prilosec medication and take it once daily for GI protection as long as they are taking NSAIDs or Aspirin.    DVT prophylaxis was discussed with the patient today including risk factors for developing DVTs and history of DVTs. The patient was asked if any specific recommendations were given from the doctor/s that did pre-operative surgical clearance. I may have prescribed a mechanical DVT prophylaxis device, , for the above listed patient, to reduce the risk for clot formation and complications that can arise from a DVT. In my professional medical opinion, I consider this medically necessary and have prescribed the device for the purpose of postoperative treatment and rehabilitation. This can treat both the acute and chronic aspects of the inflammatory reaction that accompanies trauma and surgery. Additionally, I am prescribing mechanical DVT prophylaxis because the patient will have restricted mobility post-operatively and is at high risk for DVT. Failure to approve this device will compromise the outcome of this patient's healing process.     Patient was asked if they were taking or using OCP pills or devices. If they answered yes, then they were instructed to stop using OCPs at this pre-operative appointment until 2 months post-op to help prevent DVT development. They understand that there are other forms of birth control that do not involve hormones. They expressed  understanding that ignoring/not following this instruction could result in a DVT which could turn into a deadly pulmonary embolism.      The patient was told that narcotic pain medications may make them drowsy and instructions were given to not sign legal documents, drive or operate heavy machinery, cars, or equipment while under the influence of narcotic medications.     Dr. Quintero was present in clinic during this pre-op evaluation. The patient was offered the opportunity to ask Dr. Quintero any further questions regarding the procedure which may not have been addressed during their previous informed consent discussion. The patient has declined to see Dr. Quintero today.    As there were no other questions to be asked, he was given my business card along with Dr. Quintero business card if he has any questions or concerns prior to surgery or in the postop period.

## 2024-06-21 NOTE — H&P
Ab Girard  is here for a completion of his perioperative paperwork. he  Is scheduled to undergo Right knee     1. 27415 Osteochondral Allograft knee, open (medial femur and trochlea, patella) - Acumed screws  2. 27418 Anterior tibial tubercleplasty (Kinamed system)  3. 27425 Lateral retinacular release, open (lateral lengthening)  4. 82009 Arthroscopy, debridement/shaving of articular cartilage (Chondroplasty) on 6/27/2024.      He is a healthy individual and does not need clearance for this procedure.     Risks, indications and benefits of the surgical procedure were discussed with the patient. All questions with regard to surgery, rehab, expected return to functional activities, activities of daily living and recreational endeavors were answered to his satisfaction.    Discussed COVID-19 with the patient, they are aware of our current policies and procedures, were given the option of delaying surgery, and they elect to proceed.    Patient was informed and understands the risks of surgery are greater for patients with a current condition or history of heart disease, obesity, clotting disorders, recurrent infections, steroid use, current or past smoking, and factors such as sedentary lifestyle and noncompliance with medications, therapy or follow-up. The degree of the increased risk is hard to estimate with any degree of precision.    Once no other questions were asked, a brief history and physical exam was then performed.    PAST MEDICAL HISTORY: History reviewed. No pertinent past medical history.  PAST SURGICAL HISTORY:   Past Surgical History:   Procedure Laterality Date    JACQUE PROCEDURE      TONSILLECTOMY       FAMILY HISTORY:   Family History   Problem Relation Name Age of Onset    Heart attack Paternal Grandfather      Heart disease Paternal Grandfather      Hypertension Father       SOCIAL HISTORY:   Social History     Socioeconomic History    Marital status:    Tobacco Use    Smoking status: Never     Smokeless tobacco: Never   Substance and Sexual Activity    Alcohol use: Not Currently     Comment: rarely    Drug use: Not Currently     Types: Marijuana    Sexual activity: Yes     Partners: Female     Birth control/protection: Condom     Social Determinants of Health     Financial Resource Strain: Low Risk  (4/9/2024)    Overall Financial Resource Strain (CARDIA)     Difficulty of Paying Living Expenses: Not very hard   Food Insecurity: No Food Insecurity (4/9/2024)    Hunger Vital Sign     Worried About Running Out of Food in the Last Year: Never true     Ran Out of Food in the Last Year: Never true   Transportation Needs: No Transportation Needs (4/9/2024)    PRAPARE - Transportation     Lack of Transportation (Medical): No     Lack of Transportation (Non-Medical): No   Physical Activity: Sufficiently Active (4/9/2024)    Exercise Vital Sign     Days of Exercise per Week: 5 days     Minutes of Exercise per Session: 80 min   Stress: Stress Concern Present (4/9/2024)    [employer] - Occupational Stress Questionnaire     Feeling of Stress : To some extent   Housing Stability: Low Risk  (4/9/2024)    Housing Stability Vital Sign     Unable to Pay for Housing in the Last Year: No     Number of Places Lived in the Last Year: 1     Unstable Housing in the Last Year: No       MEDICATIONS: No current outpatient medications on file.  ALLERGIES: Review of patient's allergies indicates:  No Known Allergies    Review of Systems   Constitution: Negative. Negative for chills, fever and night sweats.   HENT: Negative for congestion and headaches.    Eyes: Negative for blurred vision, left vision loss and right vision loss.   Cardiovascular: Negative for chest pain and syncope.   Respiratory: Negative for cough and shortness of breath.    Endocrine: Negative for polydipsia, polyphagia and polyuria.   Hematologic/Lymphatic: Negative for bleeding problem. Does not bruise/bleed easily.   Skin:  Negative for dry skin, itching and rash.   Musculoskeletal: Negative for falls and muscle weakness.   Gastrointestinal: Negative for abdominal pain and bowel incontinence.   Genitourinary: Negative for bladder incontinence and nocturia.   Neurological: Negative for disturbances in coordination, loss of balance and seizures.   Psychiatric/Behavioral: Negative for depression. The patient does not have insomnia.    Allergic/Immunologic: Negative for hives and persistent infections.     PHYSICAL EXAM:  GEN: A&Ox3, WD WN NAD  HEENT: WNL  CHEST: CTAB, no W/R/R  HEART: RRR, no M/R/G   ABD: Soft, NT ND, BS x4 QUADS  MS: Refer to previous note for detailed MS exam  NEURO: CN II-XII intact       The surgical consent was then reviewed with the patient, who agreed with all the contents of the consent form and it was signed. he was instructed to wait for a phone call from the anesthesia department prior to surgery to discuss past medical history, medications, and clearance. Also, informed he may be required to get additional testing per the anesthesia department prior to having surgery.     PHYSICAL THERAPY:  He was also instructed regarding physical therapy and will begin POD # 1-3. He is doing physical therapy at [facility].    POST OP CARE:instructions were reviewed including care of the wound and dressing after surgery and when he can shower.     PAIN MANAGEMENT: Ab Girard was also given a pain management regime, which includes the TENS unit given to him by our DME team, along with the education required for its use. He was also instructed regarding the ice wrap that will be in place after surgery and his postoperative pain medications.     PAIN MEDICATION:  Roxicodone 5mg 1-2 po q 4-6 hours prn pain  Phenergan 25 mg one p.o. q.4-6 hours p.r.n. nausea and vomiting.  Celebrex 200 mg BID  Robaxin 500mg TID PRN  Lyrica 75mg BID  Aspirin 325mg daily x 6 weeks for DVT prophylaxis starting on the  evening after surgery.    Post op meds to be delivered bedside prior to discharge. Deliver to family if patient is in surgery at 5pm.     Patient was instructed not to take benzodiazepines with opioid and Lyrica during the perioperative period. Patient denies history of seizures.     Patient will also use bilateral TEDs on lower extremities, SCDs during surgery, and early ambulation post-op. If the patient was previously taking 81mg baby aspirin, they may have been told not to hold for procedure.     Patient was also told to buy over the counter Prilosec medication and take it once daily for GI protection as long as they are taking NSAIDs or Aspirin.    DVT prophylaxis was discussed with the patient today including risk factors for developing DVTs and history of DVTs. The patient was asked if any specific recommendations were given from the doctor/s that did pre-operative surgical clearance. I may have prescribed a mechanical DVT prophylaxis device, , for the above listed patient, to reduce the risk for clot formation and complications that can arise from a DVT. In my professional medical opinion, I consider this medically necessary and have prescribed the device for the purpose of postoperative treatment and rehabilitation. This can treat both the acute and chronic aspects of the inflammatory reaction that accompanies trauma and surgery. Additionally, I am prescribing mechanical DVT prophylaxis because the patient will have restricted mobility post-operatively and is at high risk for DVT. Failure to approve this device will compromise the outcome of this patient's healing process.     Patient was asked if they were taking or using OCP pills or devices. If they answered yes, then they were instructed to stop using OCPs at this pre-operative appointment until 2 months post-op to help prevent DVT development. They understand that there are other forms of birth control that do not involve hormones. They expressed  understanding that ignoring/not following this instruction could result in a DVT which could turn into a deadly pulmonary embolism.      The patient was told that narcotic pain medications may make them drowsy and instructions were given to not sign legal documents, drive or operate heavy machinery, cars, or equipment while under the influence of narcotic medications.     Dr. Quintero was present in clinic during this pre-op evaluation. The patient was offered the opportunity to ask Dr. Quintero any further questions regarding the procedure which may not have been addressed during their previous informed consent discussion. The patient has declined to see Dr. Quintero today.    As there were no other questions to be asked, he was given my business card along with Dr. Quintero business card if he has any questions or concerns prior to surgery or in the postop period.

## 2024-06-24 ENCOUNTER — TELEPHONE (OUTPATIENT)
Dept: SPORTS MEDICINE | Facility: CLINIC | Age: 29
End: 2024-06-24
Payer: COMMERCIAL

## 2024-06-24 ENCOUNTER — PATIENT MESSAGE (OUTPATIENT)
Dept: PREADMISSION TESTING | Facility: HOSPITAL | Age: 29
End: 2024-06-24
Payer: COMMERCIAL

## 2024-06-24 NOTE — TELEPHONE ENCOUNTER
Spoke with patient and got him rescheduled for surgery tomorrow     ----- Message from Ananda Lyons sent at 6/24/2024  2:17 PM CDT -----  Regarding: Appt  Contact: pt  [phone]  Pt is requesting call back to discuss upcoming surgery, please call pt @ [phone]

## 2024-06-24 NOTE — TELEPHONE ENCOUNTER
Spoke with patient about potentially having surgery tomorrow, he is going to call his wife and give us a call back to let us know    Alexa   Clinical & Surgical Assistant to Dr. Caroline Quintero

## 2024-06-24 NOTE — ANESTHESIA PAT ROS NOTE
06/24/2024  Ab Girard is a 29 y.o., male.      Pre-op Assessment    I have reviewed the Patient Summary Reports.       I have reviewed the Medications.     Review of Systems  Anesthesia Hx:  No problems with previous Anesthesia               Denies Personal Hx of Anesthesia complications.                    Social:  Non-Smoker, No Alcohol Use       Hematology/Oncology:  Hematology Normal   Oncology Normal                                   EENT/Dental:   H/O Tonsillectomy          Cardiovascular:  Exercise tolerance: good       Denies CAD.    Dysrhythmias   Denies Angina.       Denies QUEZADA.  ECG has been reviewed. Incomplete RBBB, seen by Cardiology                                 Pulmonary:  Pulmonary Normal    Denies Asthma.   Denies Shortness of breath.                  Renal/:   Denies Chronic Renal Disease.   Hypogonadism, seen by Endocrinology             Hepatic/GI:      Denies GERD.   H/O Elevated liver enzymes, seen by Hepatology          Musculoskeletal:     Chondromalacia of right knee,  Chondromalacia of right patella,  Patellar instability of right knee,  Internal derangement of right knee,  H/O Jacque Procedure              Neurological:  Neurology Normal   Denies CVA.    Denies Headaches. Denies Seizures.                                Endocrine:  Endocrine Normal Denies Diabetes. Denies Hypothyroidism.          Psych:  Psychiatric Normal                   No past medical history on file.  Past Surgical History:   Procedure Laterality Date    JACQUE PROCEDURE      TONSILLECTOMY         Anesthesia Assessment: Preoperative EQUATION    Planned Procedure: Procedure(s) (LRB):  INSERTION,ALLOGRAFT,OSTEOCHONDRAL (Right)  TUBERCLEPLASTY, TIBIA, ARTHROSCOPY-ASSISTED (Right)  RELEASE, KNEE, LATERAL RETINACULA (Right)  CHONDROPLASTY, KNEE (Right)  Requested Anesthesia Type:General  Surgeon: Caroline Quintero,  MD  Service: Orthopedics  Known or anticipated Date of Surgery:6/27/2024    Surgeon notes: reviewed    Electronic QUestionnaire Assessment completed via nurse interview with patient.        Triage considerations:     The patient has no apparent active cardiac condition (No unstable coronary Syndrome such as severe unstable angina or recent [<1 month] myocardial infarction, decompensated CHF, severe valvular   disease or significant arrhythmia)    Previous anesthesia records:No problems and Not available    Last PCP note:  No primary care visits noted upon chart review.   Subspecialty notes: Endocrinology, Hepatology, Cardiology    Other important co-morbidities:  No co-morbidities noted.         EKG 4/25/2022:  Vent. Rate : 063 BPM     Atrial Rate : 063 BPM      P-R Int : 162 ms          QRS Dur : 098 ms       QT Int : 354 ms       P-R-T Axes : 082 108 -36 degrees      QTc Int : 362 ms   Normal sinus rhythm with sinus arrhythmia   Incomplete right bundle branch block   Possible Right ventricular hypertrophy   Moderate voltage criteria for LVH, may be normal variant   T wave abnormality, consider inferior ischemia   T wave abnormality, consider anterolateral ischemia   Abnormal ECG   No previous ECGs available   Confirmed by Jesus Carpenter MD ([id number]) on 4/26/2022 8:45:26 AM        Tests already available:  No recent testing            Instructions given. (See in Nurse's note)  Preop medication instructions sent via portal message.     Optimization:  Anesthesia Preop Clinic Assessment Not Indicated    Medical Opinion Indicated: No       Sub-specialist consult indicated: No      Plan: Consultation: Medical clearance is not requested.         Navigation: Straight Line to surgery.              No tests, anesthesia preop clinic visit, or consult required.                       Patient is OK to proceed with surgery at [facility].       Ht: 6'3  Wt: 104.3 kg (230 lb)  BMI: 28.75  Vaccinated

## 2024-06-24 NOTE — TELEPHONE ENCOUNTER
Spoke with patient and gave arrival time for surgery tomorrow 6:30am     Alexa   Clinical & Surgical Assistant to Dr. Caroline Quintero

## 2024-06-25 ENCOUNTER — HOSPITAL ENCOUNTER (OUTPATIENT)
Facility: HOSPITAL | Age: 29
Discharge: HOME | End: 2024-06-25
Attending: ORTHOPAEDIC SURGERY | Admitting: ORTHOPAEDIC SURGERY
Payer: COMMERCIAL

## 2024-06-25 ENCOUNTER — ANESTHESIA EVENT (OUTPATIENT)
Dept: SURGERY | Facility: HOSPITAL | Age: 29
End: 2024-06-25
Payer: COMMERCIAL

## 2024-06-25 ENCOUNTER — ANESTHESIA (OUTPATIENT)
Dept: SURGERY | Facility: HOSPITAL | Age: 29
End: 2024-06-25
Payer: COMMERCIAL

## 2024-06-25 VITALS
DIASTOLIC BLOOD PRESSURE: 62 MMHG | SYSTOLIC BLOOD PRESSURE: 135 MMHG | OXYGEN SATURATION: 95 % | BODY MASS INDEX: 28.6 KG/M2 | WEIGHT: 230 LBS | RESPIRATION RATE: 22 BRPM | HEART RATE: 63 BPM | HEIGHT: 75 IN | TEMPERATURE: 98 F

## 2024-06-25 DIAGNOSIS — M23.91 INTERNAL DERANGEMENT OF RIGHT KNEE: ICD-10-CM

## 2024-06-25 DIAGNOSIS — M94.261 CHONDROMALACIA OF RIGHT KNEE: ICD-10-CM

## 2024-06-25 DIAGNOSIS — M25.361 PATELLAR INSTABILITY OF RIGHT KNEE: ICD-10-CM

## 2024-06-25 PROCEDURE — C1713 ANCHOR/SCREW BN/BN,TIS/BN: HCPCS | Performed by: ORTHOPAEDIC SURGERY

## 2024-06-25 PROCEDURE — 25000003 PHARM REV CODE 250: Performed by: NURSE ANESTHETIST, CERTIFIED REGISTERED

## 2024-06-25 PROCEDURE — 29884 ARTHRS KNEE SURG LYSIS ADS: CPT | Mod: 51,82,RT, | Performed by: STUDENT IN AN ORGANIZED HEALTH CARE EDUCATION/TRAINING PROGRAM

## 2024-06-25 PROCEDURE — 27100025 HC TUBING, SET FLUID WARMER: Performed by: ANESTHESIOLOGY

## 2024-06-25 PROCEDURE — C1889 IMPLANT/INSERT DEVICE, NOC: HCPCS | Performed by: ORTHOPAEDIC SURGERY

## 2024-06-25 PROCEDURE — 27200750 HC INSULATED NEEDLE/ STIMUPLEX: Performed by: ANESTHESIOLOGY

## 2024-06-25 PROCEDURE — 37000008 HC ANESTHESIA 1ST 15 MINUTES: Performed by: ORTHOPAEDIC SURGERY

## 2024-06-25 PROCEDURE — 71000033 HC RECOVERY, INTIAL HOUR: Performed by: ORTHOPAEDIC SURGERY

## 2024-06-25 PROCEDURE — 27418 REPAIR DEGENERATED KNEECAP: CPT | Mod: 51,RT,, | Performed by: ORTHOPAEDIC SURGERY

## 2024-06-25 PROCEDURE — 25000003 PHARM REV CODE 250: Performed by: ANESTHESIOLOGY

## 2024-06-25 PROCEDURE — 94761 N-INVAS EAR/PLS OXIMETRY MLT: CPT

## 2024-06-25 PROCEDURE — 71000015 HC POSTOP RECOV 1ST HR: Performed by: ORTHOPAEDIC SURGERY

## 2024-06-25 PROCEDURE — 27425 LAT RETINACULAR RELEASE OPEN: CPT | Mod: 59,51,RT, | Performed by: ORTHOPAEDIC SURGERY

## 2024-06-25 PROCEDURE — 99900035 HC TECH TIME PER 15 MIN (STAT)

## 2024-06-25 PROCEDURE — 63600175 PHARM REV CODE 636 W HCPCS: Performed by: ORTHOPAEDIC SURGERY

## 2024-06-25 PROCEDURE — 25000003 PHARM REV CODE 250: Performed by: PHYSICIAN ASSISTANT

## 2024-06-25 PROCEDURE — 63600175 PHARM REV CODE 636 W HCPCS: Performed by: ANESTHESIOLOGY

## 2024-06-25 PROCEDURE — C1769 GUIDE WIRE: HCPCS | Performed by: ORTHOPAEDIC SURGERY

## 2024-06-25 PROCEDURE — C1762 CONN TISS, HUMAN(INC FASCIA): HCPCS | Performed by: ORTHOPAEDIC SURGERY

## 2024-06-25 PROCEDURE — 37000009 HC ANESTHESIA EA ADD 15 MINS: Performed by: ORTHOPAEDIC SURGERY

## 2024-06-25 PROCEDURE — 27415 OSTEOCHONDRAL KNEE ALLOGRAFT: CPT | Mod: 22,RT,, | Performed by: ORTHOPAEDIC SURGERY

## 2024-06-25 PROCEDURE — 25000003 PHARM REV CODE 250: Performed by: ORTHOPAEDIC SURGERY

## 2024-06-25 PROCEDURE — 63600175 PHARM REV CODE 636 W HCPCS: Performed by: NURSE ANESTHETIST, CERTIFIED REGISTERED

## 2024-06-25 PROCEDURE — 36000710: Performed by: ORTHOPAEDIC SURGERY

## 2024-06-25 PROCEDURE — 63600175 PHARM REV CODE 636 W HCPCS: Performed by: PHYSICIAN ASSISTANT

## 2024-06-25 PROCEDURE — 64448 NJX AA&/STRD FEM NRV NFS IMG: CPT | Performed by: ANESTHESIOLOGY

## 2024-06-25 PROCEDURE — 27418 REPAIR DEGENERATED KNEECAP: CPT | Mod: 82,RT,, | Performed by: STUDENT IN AN ORGANIZED HEALTH CARE EDUCATION/TRAINING PROGRAM

## 2024-06-25 PROCEDURE — 27415 OSTEOCHONDRAL KNEE ALLOGRAFT: CPT | Mod: 22,82,RT, | Performed by: STUDENT IN AN ORGANIZED HEALTH CARE EDUCATION/TRAINING PROGRAM

## 2024-06-25 PROCEDURE — 29884 ARTHRS KNEE SURG LYSIS ADS: CPT | Mod: 51,RT,, | Performed by: ORTHOPAEDIC SURGERY

## 2024-06-25 PROCEDURE — 27201423 OPTIME MED/SURG SUP & DEVICES STERILE SUPPLY: Performed by: ORTHOPAEDIC SURGERY

## 2024-06-25 PROCEDURE — C1751 CATH, INF, PER/CENT/MIDLINE: HCPCS | Performed by: ANESTHESIOLOGY

## 2024-06-25 PROCEDURE — 36000711: Performed by: ORTHOPAEDIC SURGERY

## 2024-06-25 DEVICE — FIBER CORTICAL ENHNC 5CC: Type: IMPLANTABLE DEVICE | Site: KNEE | Status: FUNCTIONAL

## 2024-06-25 DEVICE — ALLOGRAFT DST FEM WHL R: Type: IMPLANTABLE DEVICE | Site: KNEE | Status: FUNCTIONAL

## 2024-06-25 DEVICE — SCREW 4.5MM CRTX 54MM SLTP: Type: IMPLANTABLE DEVICE | Site: KNEE | Status: FUNCTIONAL

## 2024-06-25 DEVICE — SCREW CORTEX 4.5 46M: Type: IMPLANTABLE DEVICE | Site: KNEE | Status: FUNCTIONAL

## 2024-06-25 DEVICE — ALLOGRAFT OSTEOCHNDRL PAT R: Type: IMPLANTABLE DEVICE | Site: KNEE | Status: FUNCTIONAL

## 2024-06-25 RX ORDER — MIDAZOLAM HYDROCHLORIDE 1 MG/ML
1 INJECTION, SOLUTION INTRAMUSCULAR; INTRAVENOUS
Status: DISCONTINUED | OUTPATIENT
Start: 2024-06-25 | End: 2024-06-25 | Stop reason: HOSPADM

## 2024-06-25 RX ORDER — ROPIVACAINE HYDROCHLORIDE 5 MG/ML
INJECTION, SOLUTION EPIDURAL; INFILTRATION; PERINEURAL
Status: COMPLETED | OUTPATIENT
Start: 2024-06-25 | End: 2024-06-25

## 2024-06-25 RX ORDER — MULTIVITAMIN
1 TABLET ORAL DAILY
COMMUNITY

## 2024-06-25 RX ORDER — ROCURONIUM BROMIDE 10 MG/ML
INJECTION, SOLUTION INTRAVENOUS
Status: DISCONTINUED | OUTPATIENT
Start: 2024-06-25 | End: 2024-06-25

## 2024-06-25 RX ORDER — ROPIVACAINE/EPI/CLONIDINE/KET 2.46-0.005
SYRINGE (ML) INJECTION
Status: DISCONTINUED | OUTPATIENT
Start: 2024-06-25 | End: 2024-06-25 | Stop reason: HOSPADM

## 2024-06-25 RX ORDER — AMOXICILLIN 500 MG
CAPSULE ORAL DAILY
COMMUNITY

## 2024-06-25 RX ORDER — PHENYLEPHRINE HYDROCHLORIDE 10 MG/ML
INJECTION INTRAVENOUS
Status: DISCONTINUED | OUTPATIENT
Start: 2024-06-25 | End: 2024-06-25

## 2024-06-25 RX ORDER — ONDANSETRON HYDROCHLORIDE 2 MG/ML
INJECTION, SOLUTION INTRAMUSCULAR; INTRAVENOUS
Status: DISCONTINUED | OUTPATIENT
Start: 2024-06-25 | End: 2024-06-25

## 2024-06-25 RX ORDER — ROPIVACAINE HYDROCHLORIDE 2 MG/ML
INJECTION, SOLUTION EPIDURAL; INFILTRATION; PERINEURAL CONTINUOUS
Status: DISCONTINUED | OUTPATIENT
Start: 2024-06-25 | End: 2024-06-25 | Stop reason: HOSPADM

## 2024-06-25 RX ORDER — CELECOXIB 200 MG/1
400 CAPSULE ORAL
Status: COMPLETED | OUTPATIENT
Start: 2024-06-25 | End: 2024-06-25

## 2024-06-25 RX ORDER — FENTANYL CITRATE 50 UG/ML
100 INJECTION, SOLUTION INTRAMUSCULAR; INTRAVENOUS
Status: DISCONTINUED | OUTPATIENT
Start: 2024-06-25 | End: 2024-06-25 | Stop reason: HOSPADM

## 2024-06-25 RX ORDER — FAMOTIDINE 10 MG/ML
INJECTION INTRAVENOUS
Status: DISCONTINUED | OUTPATIENT
Start: 2024-06-25 | End: 2024-06-25

## 2024-06-25 RX ORDER — PROPOFOL 10 MG/ML
VIAL (ML) INTRAVENOUS
Status: DISCONTINUED | OUTPATIENT
Start: 2024-06-25 | End: 2024-06-25

## 2024-06-25 RX ORDER — EPINEPHRINE 1 MG/ML
INJECTION INTRAMUSCULAR; INTRAVENOUS; SUBCUTANEOUS
Status: DISCONTINUED | OUTPATIENT
Start: 2024-06-25 | End: 2024-06-25 | Stop reason: HOSPADM

## 2024-06-25 RX ORDER — DEXAMETHASONE SODIUM PHOSPHATE 4 MG/ML
INJECTION, SOLUTION INTRA-ARTICULAR; INTRALESIONAL; INTRAMUSCULAR; INTRAVENOUS; SOFT TISSUE
Status: DISCONTINUED | OUTPATIENT
Start: 2024-06-25 | End: 2024-06-25

## 2024-06-25 RX ORDER — ASPIRIN 81 MG/1
81 TABLET ORAL DAILY
COMMUNITY

## 2024-06-25 RX ORDER — HALOPERIDOL 5 MG/ML
0.5 INJECTION INTRAMUSCULAR EVERY 10 MIN PRN
Status: DISCONTINUED | OUTPATIENT
Start: 2024-06-25 | End: 2024-06-25 | Stop reason: HOSPADM

## 2024-06-25 RX ORDER — MUPIROCIN 20 MG/G
OINTMENT TOPICAL
Status: DISCONTINUED | OUTPATIENT
Start: 2024-06-25 | End: 2024-06-25 | Stop reason: HOSPADM

## 2024-06-25 RX ORDER — ERGOCALCIFEROL 1.25 MG/1
50000 CAPSULE ORAL
COMMUNITY

## 2024-06-25 RX ORDER — SODIUM CHLORIDE 0.9 % (FLUSH) 0.9 %
10 SYRINGE (ML) INJECTION CONTINUOUS
Status: DISCONTINUED | OUTPATIENT
Start: 2024-06-25 | End: 2024-06-25 | Stop reason: HOSPADM

## 2024-06-25 RX ORDER — VANCOMYCIN HYDROCHLORIDE 1 G/20ML
INJECTION, POWDER, LYOPHILIZED, FOR SOLUTION INTRAVENOUS
Status: DISCONTINUED | OUTPATIENT
Start: 2024-06-25 | End: 2024-06-25 | Stop reason: HOSPADM

## 2024-06-25 RX ORDER — KETAMINE HCL IN 0.9 % NACL 50 MG/5 ML
SYRINGE (ML) INTRAVENOUS
Status: DISCONTINUED | OUTPATIENT
Start: 2024-06-25 | End: 2024-06-25

## 2024-06-25 RX ORDER — LIDOCAINE HYDROCHLORIDE 20 MG/ML
INJECTION INTRAVENOUS
Status: DISCONTINUED | OUTPATIENT
Start: 2024-06-25 | End: 2024-06-25

## 2024-06-25 RX ORDER — OXYCODONE HYDROCHLORIDE 5 MG/1
5 TABLET ORAL
Status: DISCONTINUED | OUTPATIENT
Start: 2024-06-25 | End: 2024-06-25 | Stop reason: HOSPADM

## 2024-06-25 RX ORDER — SODIUM CHLORIDE 0.9 % (FLUSH) 0.9 %
3 SYRINGE (ML) INJECTION
Status: DISCONTINUED | OUTPATIENT
Start: 2024-06-25 | End: 2024-06-25 | Stop reason: HOSPADM

## 2024-06-25 RX ORDER — FENTANYL CITRATE 50 UG/ML
25 INJECTION, SOLUTION INTRAMUSCULAR; INTRAVENOUS EVERY 5 MIN PRN
Status: DISCONTINUED | OUTPATIENT
Start: 2024-06-25 | End: 2024-06-25 | Stop reason: HOSPADM

## 2024-06-25 RX ORDER — ACETAMINOPHEN 500 MG
1000 TABLET ORAL
Status: COMPLETED | OUTPATIENT
Start: 2024-06-25 | End: 2024-06-25

## 2024-06-25 RX ADMIN — FENTANYL CITRATE 50 MCG: 50 INJECTION INTRAMUSCULAR; INTRAVENOUS at 08:06

## 2024-06-25 RX ADMIN — SODIUM CHLORIDE, SODIUM GLUCONATE, SODIUM ACETATE, POTASSIUM CHLORIDE, MAGNESIUM CHLORIDE, SODIUM PHOSPHATE, DIBASIC, AND POTASSIUM PHOSPHATE: .53; .5; .37; .037; .03; .012; .00082 INJECTION, SOLUTION INTRAVENOUS at 10:06

## 2024-06-25 RX ADMIN — PHENYLEPHRINE HYDROCHLORIDE 100 MCG: 10 INJECTION INTRAVENOUS at 10:06

## 2024-06-25 RX ADMIN — DEXAMETHASONE SODIUM PHOSPHATE 8 MG: 4 INJECTION, SOLUTION INTRAMUSCULAR; INTRAVENOUS at 09:06

## 2024-06-25 RX ADMIN — ONDANSETRON 4 MG: 2 INJECTION INTRAMUSCULAR; INTRAVENOUS at 01:06

## 2024-06-25 RX ADMIN — SODIUM CHLORIDE: 0.9 INJECTION, SOLUTION INTRAVENOUS at 07:06

## 2024-06-25 RX ADMIN — ROCURONIUM BROMIDE 50 MG: 10 INJECTION, SOLUTION INTRAVENOUS at 09:06

## 2024-06-25 RX ADMIN — SODIUM CHLORIDE, SODIUM GLUCONATE, SODIUM ACETATE, POTASSIUM CHLORIDE, MAGNESIUM CHLORIDE, SODIUM PHOSPHATE, DIBASIC, AND POTASSIUM PHOSPHATE: .53; .5; .37; .037; .03; .012; .00082 INJECTION, SOLUTION INTRAVENOUS at 01:06

## 2024-06-25 RX ADMIN — LIDOCAINE HYDROCHLORIDE 100 MG: 20 INJECTION INTRAVENOUS at 09:06

## 2024-06-25 RX ADMIN — MIDAZOLAM 2 MG: 1 INJECTION INTRAMUSCULAR; INTRAVENOUS at 09:06

## 2024-06-25 RX ADMIN — PROPOFOL 200 MG: 10 INJECTION, EMULSION INTRAVENOUS at 09:06

## 2024-06-25 RX ADMIN — CELECOXIB 400 MG: 200 CAPSULE ORAL at 07:06

## 2024-06-25 RX ADMIN — Medication 30 MG: at 09:06

## 2024-06-25 RX ADMIN — Medication: at 02:06

## 2024-06-25 RX ADMIN — MUPIROCIN: 20 OINTMENT TOPICAL at 07:06

## 2024-06-25 RX ADMIN — CEFAZOLIN 2 G: 2 INJECTION, POWDER, FOR SOLUTION INTRAMUSCULAR; INTRAVENOUS at 09:06

## 2024-06-25 RX ADMIN — FENTANYL CITRATE 50 MCG: 50 INJECTION INTRAMUSCULAR; INTRAVENOUS at 11:06

## 2024-06-25 RX ADMIN — OXYCODONE 5 MG: 5 TABLET ORAL at 03:06

## 2024-06-25 RX ADMIN — ACETAMINOPHEN 1000 MG: 500 TABLET ORAL at 07:06

## 2024-06-25 RX ADMIN — FENTANYL CITRATE 50 MCG: 50 INJECTION INTRAMUSCULAR; INTRAVENOUS at 09:06

## 2024-06-25 RX ADMIN — MIDAZOLAM 2 MG: 1 INJECTION INTRAMUSCULAR; INTRAVENOUS at 08:06

## 2024-06-25 RX ADMIN — ROPIVACAINE HYDROCHLORIDE 8 ML: 5 INJECTION EPIDURAL; INFILTRATION; PERINEURAL at 08:06

## 2024-06-25 RX ADMIN — FAMOTIDINE 20 MG: 10 INJECTION, SOLUTION INTRAVENOUS at 09:06

## 2024-06-25 RX ADMIN — Medication 20 MG: at 11:06

## 2024-06-25 NOTE — TRANSFER OF CARE
"Anesthesia Transfer of Care Note    Patient: Ab Girard    Procedure(s) Performed: Procedure(s) (LRB):  INSERTION,ALLOGRAFT,OSTEOCHONDRAL (Right)  TUBERCLEPLASTY, TIBIA, ARTHROSCOPY-ASSISTED (Right)  RELEASE, KNEE, LATERAL RETINACULA (Right)  CHONDROPLASTY, KNEE (Right)  REMOVAL, LOOSE BODY, JOINT, ARTHROSCOPIC (Right)    Patient location: PACU    Anesthesia Type: general    Transport from OR: Transported from OR on 6-10 L/min O2 by face mask with adequate spontaneous ventilation    Post pain: adequate analgesia    Post assessment: no apparent anesthetic complications and tolerated procedure well    Post vital signs: stable    Level of consciousness: sedated    Nausea/Vomiting: no nausea/vomiting    Complications: none    Transfer of care protocol was followed      Last vitals: Visit Vitals  /60 (BP Location: Left arm, Patient Position: Lying)   Pulse 60   Temp 37.2 °C (99 °F) (Temporal)   Resp 14   Ht 6' 3" (1.905 m)   Wt 104.3 kg (230 lb)   SpO2 98%   BMI 28.75 kg/m²     "

## 2024-06-25 NOTE — PLAN OF CARE
Discharge instructions reviewed and pt verbalizes understanding. Pain control appropriate. CADD pump in place. Dressing is clean, dry, and intact. VSS and afebrile. Crutches at bedside. Meds delivered to bedside. Pt ambulatory. AVS complete.

## 2024-06-25 NOTE — PLAN OF CARE
Pre op complete. Pt's mother at bedside; we will lock all belongings in locker. Pt resting comfortably with all questions addressed at this time and call light in reach.

## 2024-06-25 NOTE — ANESTHESIA PREPROCEDURE EVALUATION
06/25/2024  Ab Girard is a 29 y.o., male.    Pre-operative evaluation for Procedure(s) (LRB):  INSERTION,ALLOGRAFT,OSTEOCHONDRAL (Right)  TUBERCLEPLASTY, TIBIA, ARTHROSCOPY-ASSISTED (Right)  RELEASE, KNEE, LATERAL RETINACULA (Right)  CHONDROPLASTY, KNEE (Right)        Patient Active Problem List   Diagnosis    Endocrine function study abnormality    Incomplete RBBB    Patella, chondromalacia, left    Chondromalacia of right patella    Knee pain    Patellar malalignment syndrome of left knee    Chondromalacia of patellofemoral joint       Review of patient's allergies indicates:  No Known Allergies    No current facility-administered medications on file prior to encounter.     Current Outpatient Medications on File Prior to Encounter   Medication Sig Dispense Refill    ergocalciferol (VITAMIN D2) 50,000 unit Cap Take 50,000 Units by mouth every 7 days.      multivitamin (ONE DAILY MULTIVITAMIN) per tablet Take 1 tablet by mouth once daily.      omega-3 fatty acids/fish oil (FISH OIL-OMEGA-3 FATTY ACIDS) 300-1,000 mg capsule Take by mouth once daily.      aspirin (ECOTRIN) 81 MG EC tablet Take 81 mg by mouth once daily.         Past Surgical History:   Procedure Laterality Date    JACQUE PROCEDURE      TONSILLECTOMY           Diagnostic Studies:      EKG:   Results for orders placed or performed during the hospital encounter of 04/25/22   EKG 12-lead    Collection Time: 04/25/22 12:28 PM    Narrative    Test Reason : R94.31,    Vent. Rate : 063 BPM     Atrial Rate : 063 BPM     P-R Int : 162 ms          QRS Dur : 098 ms      QT Int : 354 ms       P-R-T Axes : 082 108 -36 degrees     QTc Int : 362 ms    Normal sinus rhythm with sinus arrhythmia  Incomplete right bundle branch block  Possible Right ventricular hypertrophy  Moderate voltage criteria for LVH, may be normal variant  T wave abnormality, consider  "inferior ischemia  T wave abnormality, consider anterolateral ischemia  Abnormal ECG  No previous ECGs available  Confirmed by Jesus Carpenter MD ([id number]) on 4/26/2022 8:45:26 AM    Referred By:  MD           Confirmed By:Jesus Carpenter MD        Pre-op Vitals [06/25/24 0714]   BP Pulse Resp Temp SpO2   135/62 63 16 37.2 °C (99 °F) 98 %      Height Weight BMI (Calculated)     6' 3" 230 lb 28.7            Pre-op Assessment    I have reviewed the Patient Summary Reports.       I have reviewed the Medications.     Review of Systems  Anesthesia Hx:  No problems with previous Anesthesia               Denies Personal Hx of Anesthesia complications.                    Social:  Non-Smoker, No Alcohol Use       Hematology/Oncology:  Hematology Normal   Oncology Normal                                   EENT/Dental:   H/O Tonsillectomy          Cardiovascular:  Exercise tolerance: good       Denies CAD.    Dysrhythmias   Denies Angina.       Denies QUEZADA.  ECG has been reviewed. Incomplete RBBB, seen by Cardiology                                 Pulmonary:  Pulmonary Normal    Denies Asthma.   Denies Shortness of breath.                  Renal/:   Denies Chronic Renal Disease.   Hypogonadism, seen by Endocrinology             Hepatic/GI:      Denies GERD.   H/O Elevated liver enzymes, seen by Hepatology          Musculoskeletal:     Chondromalacia of right knee,  Chondromalacia of right patella,  Patellar instability of right knee,  Internal derangement of right knee,  H/O Mauricio Procedure              Neurological:  Neurology Normal   Denies CVA.    Denies Headaches. Denies Seizures.                                Endocrine:  Endocrine Normal Denies Diabetes. Denies Hypothyroidism.          Psych:  Psychiatric Normal                  Physical Exam  General: Well nourished and Cooperative    Airway:  Mallampati: I   Neck ROM: Normal ROM    Dental:  Intact    Chest/Lungs:  Normal Respiratory Rate    Heart:  Rate: " Normal  Rhythm: Regular Rhythm      History reviewed. No pertinent past medical history.  Past Surgical History:   Procedure Laterality Date    JACQUE PROCEDURE      TONSILLECTOMY         Anesthesia Assessment: Preoperative EQUATION    Planned Procedure: Procedure(s) (LRB):  INSERTION,ALLOGRAFT,OSTEOCHONDRAL (Right)  TUBERCLEPLASTY, TIBIA, ARTHROSCOPY-ASSISTED (Right)  RELEASE, KNEE, LATERAL RETINACULA (Right)  CHONDROPLASTY, KNEE (Right)  Requested Anesthesia Type:General  Surgeon: Caroline Quintero MD  Service: Orthopedics  Known or anticipated Date of Surgery:6/27/2024    Surgeon notes: reviewed    Electronic QUestionnaire Assessment completed via nurse interview with patient.        Triage considerations:     The patient has no apparent active cardiac condition (No unstable coronary Syndrome such as severe unstable angina or recent [<1 month] myocardial infarction, decompensated CHF, severe valvular   disease or significant arrhythmia)    Previous anesthesia records:No problems and Not available    Last PCP note:  No primary care visits noted upon chart review.   Subspecialty notes: Endocrinology, Hepatology, Cardiology    Other important co-morbidities:  No co-morbidities noted.         EKG 4/25/2022:  Vent. Rate : 063 BPM     Atrial Rate : 063 BPM      P-R Int : 162 ms          QRS Dur : 098 ms       QT Int : 354 ms       P-R-T Axes : 082 108 -36 degrees      QTc Int : 362 ms   Normal sinus rhythm with sinus arrhythmia   Incomplete right bundle branch block   Possible Right ventricular hypertrophy   Moderate voltage criteria for LVH, may be normal variant   T wave abnormality, consider inferior ischemia   T wave abnormality, consider anterolateral ischemia   Abnormal ECG   No previous ECGs available   Confirmed by Jesus Carpenter MD ([id number]) on 4/26/2022 8:45:26 AM        Tests already available:  No recent testing            Instructions given. (See in Nurse's note)  Preop medication instructions sent via portal  message.     Optimization:  Anesthesia Preop Clinic Assessment Not Indicated    Medical Opinion Indicated: No       Sub-specialist consult indicated: No      Plan: Consultation: Medical clearance is not requested.         Navigation: Straight Line to surgery.              No tests, anesthesia preop clinic visit, or consult required.                       Patient is OK to proceed with surgery at [facility].       Ht: 6'3  Wt: 104.3 kg (230 lb)  BMI: 28.75  Vaccinated    Anesthesia Plan  Type of Anesthesia, risks & benefits discussed:    Anesthesia Type: Gen ETT, Gen Supraglottic Airway, Regional  Intra-op Monitoring Plan: Standard ASA Monitors  Post Op Pain Control Plan: multimodal analgesia, peripheral nerve block and IV/PO Opioids PRN  Induction:  IV  Informed Consent: Informed consent signed with the Patient and all parties understand the risks and agree with anesthesia plan.  All questions answered.   ASA Score: 1    Ready For Surgery From Anesthesia Perspective.     .

## 2024-06-25 NOTE — ANESTHESIA PROCEDURE NOTES
Adductor Canal catheter    Patient location during procedure: pre-op   Block not for primary anesthetic.  Reason for block: at surgeon's request and post-op pain management   Post-op Pain Location: right knee pain   Start time: 6/25/2024 8:04 AM  Timeout: 6/25/2024 8:02 AM   End time: 6/25/2024 8:12 AM    Staffing  Authorizing Provider: Sydney Esquivel MD  Performing Provider: Sydney Esquivel MD    Staffing  Performed by: Sydney Esquivel MD  Authorized by: Sydney Esquivel MD    Preanesthetic Checklist  Completed: patient identified, IV checked, site marked, risks and benefits discussed, surgical consent, monitors and equipment checked, pre-op evaluation and timeout performed  Peripheral Block  Patient position: supine  Prep: ChloraPrep and site prepped and draped  Patient monitoring: heart rate, cardiac monitor, continuous pulse ox, continuous capnometry and frequent blood pressure checks  Block type: adductor canal  Laterality: right  Injection technique: continuous  Needle  Needle type: Tuohy   Needle gauge: 17 G  Needle length: 3.5 in  Needle localization: anatomical landmarks and ultrasound guidance  Catheter type: spring wound  Catheter size: 19 G  Test dose: lidocaine 1.5% with Epi 1-to-200,000 and negative   -ultrasound image captured on disc.  Assessment  Injection assessment: negative aspiration, negative parasthesia and local visualized surrounding nerve  Paresthesia pain: none  Heart rate change: no  Slow fractionated injection: yes    Medications:    Medications: ropivacaine (NAROPIN) injection 0.5% - Perineural   8 mL - 6/25/2024 8:10:00 AM    Additional Notes  VSS.  [facility] RN monitoring vitals throughout procedure.  Patient tolerated procedure well.     15 cc of 0.25% ropivacaine with 1/300k epi used as local for Adductor canal catheter    10 cc of 0.25% bupiv with 1/300k epi used as local for infiltration for anterior cutaneous nerves of the thigh

## 2024-06-25 NOTE — ANESTHESIA PROCEDURE NOTES
Intubation    Date/Time: 6/25/2024 9:37 AM    Performed by: Elizabeth Mullins CRNA  Authorized by: Elizabeth Mullins CRNA    Intubation:     Induction:  Intravenous    Intubated:  Postinduction    Mask Ventilation:  Easy with oral airway    Attempts:  1    Attempted By:  CRNA    Method of Intubation:  Video laryngoscopy    Blade:  Parsons 4    Laryngeal View Grade: Grade I - full view of cords      Difficult Airway Encountered?: No      Complications:  None    Airway Device:  Oral endotracheal tube    Airway Device Size:  7.5    Style/Cuff Inflation:  Cuffed    Inflation Amount (mL):  5    Tube secured:  23    Secured at:  The lips    Placement Verified By:  Capnometry    Complicating Factors:  None    Findings Post-Intubation:  BS equal bilateral and atraumatic/condition of teeth unchanged

## 2024-06-25 NOTE — DISCHARGE INSTRUCTIONS
[address], [place]                                                                                          [phone]                   Postoperative Instructions for Knee Surgery                 Your Surgery Included:   Open               Arthroscopic   [] Ligament Repair       [] Diagnostic     [] ACL     [] PCL     [] MCL     [] MQTFL   [] ALL      [] Synovectomy / Plica Removal [] Meniscal Cartilage Repair / Transplantation      [] Lysis of Adhesions / Manipulation [] Articular Cartilage Repair      [] Interval Release           [] Cartimax       [] OATS   [] ALPA      [] Meniscectomy           [x] Osteochondral Allograft      [] Meniscal Cartilage Repair  [x] Patellar Realignment- anterior tibial tubercleplasty       [x] Debridement / Chondroplasty         [x] Lateral retinacular lengthening  [] Ligament Repair      [] Articular Cartilage Repair          [] Extensor Mechanism             []   Microfracture  []  OATS         []  Cartilage Biopsy [] Tendon Repair          [] Ligament Reconstruction          [] Patella                  [] Quadriceps             []   ACL    []   PCL  [] High Tibial Osteotomy       [] Subchondroplasty  [] Joint Replacement         [x] Loose Body Removal           [] Unicompartmental   [] Patellofemoral       [] Distal Femoral Osteotomy                 Call our office ([phone]) immediately or message through [facility] if you experience any of the following:       Excessive bleeding or pus like drainage at the incision site       Uncontrollable pain not relieved by pain medication       Excessive swelling or redness at the incision site       Fever above 101.5 degrees not controlled with Tylenol or Motrin       Shortness of Breath or severe calf pain       Any foul odor or blistering from the surgery site    FOR EMERGENCIES: [facility] is the best way to contact us. If on the weekend, page the  at [phone] who will direct your  call appropriately. If your procedure is a total joint replacement, please call the number on your wristband.    1.   Multimodal Pain Management: A cold therapy cuff, pain medications, anti-inflammatories, local injections, TENs unit, and in some cases, regional anesthesia injections are used to manage your post-operative pain. The decision to use each of these options is based on their risks and benefits.     Medications: You were given one or more of the following medication prescriptions during your preoperative appointment. Follow the instructions on the bottles.     Narcotic Medication (usually Percocet, Roxicodone, or Norco): Begin taking the medication before your knee starts to hurt. Some patients do not like to take any medication, but if you wait until your pain is severe before taking, you will be very uncomfortable for several hours waiting for the narcotic to work. Always take with food.     Nausea / Vomiting: For this issue, we prescribe Phenergan or Zofran, use this medication as directed as needed for nausea.     Cold Therapy: You may have been sent home with an ice wrap. The cold can provide short-term pain relief, and limits swelling by reducing blood flow to the injured area. Apply the cold gel pack for 20 minutes and then take it off for 20 minutes. Use throughout the day, as needed to help relieve pain and control swelling.     Regional Anesthesia Injections (Blocks): You may have been given a regional nerve block/catheter either before or after surgery. This may make your entire leg numb for 2-5 days.                           * Proceed with caution when bearing weight on your leg.     2.   Diet: Eat a bland diet for the first day after surgery. Progress your diet as tolerated. Constipation may occur with Narcotic usage. We recommend Colace 100mg twice a day while taking narcotics.    3.   Activity: Limit your activity during the first 48 hours, keep your leg elevated with pillows under your  heel. After the first 48 hours at home, increase your activity level based on your symptoms.    4.   Dressing: (b) The soft, bulky dressing will be removed on the 3rd day after surgery. The Aquacel (tan, long adhesive bandage) will remain on until the 1st post operative appointment. Place waterproof bandages at this time. Keep wounds as dry as possible for first 2 weeks. It is normal for some blood to be seen on the dressings. It is also normal for you to see apparent bruising on the skin around your incisions. If you are concerned by the drainage or the appearance of your wound site, you can send a picture via [facility].    5.   Shower: (b) You may shower on the 3rd day after surgery. The Aquacel bandage is to be covered with saran wrap before showering. Do not get bandage wet. You may see a small incision with a suture. Place waterproof bandages  prior to shower. It is recommended to use Saran wrap before showering. Do not submerge limb in any water for 4 weeks or incisions completely healed.    6.   Knee Brace: You may have been sent home in a hinged knee brace. Your brace is set at -10 to 50 degrees of motion. Wear the brace for 6 weeks, LOCKED in full extension when walking, you will need to wear this brace at all time unless instructed otherwise. You may unlock at rest or for exercises.    7.   Your procedure did not require a Continuous Passive Motion (CPM) device.    8.   Weight Bearing: You may have been sent home with crutches. If instructed (see below), use these crutches at all times unless at complete rest.      [x]Toe touch to 25% weight bearing (you may touch your toes to the floor)                9.  Knee Exercises: Begin these exercises the first day after surgery in order to help you regain your motion and strength. You may do the following marked exercises:     [x] Quad Sets - Begin activating your quadriceps muscle by driving your          knee downward into full knee extension while seated on a  "table or bed   with a towel rolled and propped under your heel     [x] Straight Leg Raise (SLR) - While ro your quadriceps muscle, lift     your fully extended leg to the level of your non-operative knee (as shown)     [] Heel Slides - With the knee straight, slide your heel slowly toward your   buttocks, hold at the endpoint for 10-15 seconds, then slowly straighten     [x] Ankle pumps - With your knee straight, move your ankle in a "pumping"    fashion to activate your calf and leg muscles      10.  Physical Therapy: Physical therapy is an essential component to your recovery from surgery. Your physical therapy will start in 1-3 days.    FIRST POSTOPERATIVE VISIT: As scheduled.       "

## 2024-06-26 ENCOUNTER — PATIENT MESSAGE (OUTPATIENT)
Dept: SPORTS MEDICINE | Facility: CLINIC | Age: 29
End: 2024-06-26
Payer: COMMERCIAL

## 2024-06-26 NOTE — OP NOTE
[facility] - Surgery ([facility])  Operative Note      Date of Procedure: 6/25/2024     Procedure: PROCEDURES(S) PERFORMED: Complex  1. Right  Osteochondral allograft knee, open 99067  2.  Right  Anterior tibial tubercleplasty 06781  3.  Right  28155 Lateral retinacular release, open  4. Right  27955 Arthroscopy, knee, for removal of loose body or foreign body  5. Right  17818 Arthroscopy, debridement/shaving of articular cartilage (Chondroplasty)  6. Right  64575 Arthroscopy, with lysis of adhesions    Complexity of the case was increased based on the combined nature of the procedure with change in anatomy secondary to chronic chondral damage of multiple sites within the knee. There was increased risk to neurovascular structures secondary to the nature of the procedure and intra-operative decision making was increased as a result.    Surgeons and Role:     * Caroline Quintero MD - Primary    Assisting Surgeon:    MD Alexa Tamez SMA    It was medically necessary for Akash Soto MD to perform first assistant duties aiding in setup, exposure and closure.    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     PREOPERATIVE DIAGNOSIS:  Right  Chondromalacia, patella M22.40, Chondromalacia, (excludes patella) M94.29, and Malalignment Patella/Patellar Instability M25.369    POSTOPERATIVE DIAGNOSIS:   Right  Chondromalacia, patella M22.40, Chondromalacia, (excludes patella) M94.29, and Malalignment Patella/Patellar Instability M25.369    PROCEDURES(S) PERFORMED:   1. Right  Osteochondral allograft knee, open 82947  2.  Right  Anterior tibial tubercleplasty 06708  3.  Right  73970 Lateral retinacular release, open  4. Right  32846 Arthroscopy, knee, for removal of loose body or foreign body  5. Right  92633 Arthroscopy, debridement/shaving of articular cartilage (Chondroplasty)  6. Right  78788 Arthroscopy, with lysis of adhesions    ANESTHESIA: General endotracheal anesthesia and Adductor block with catheter,   Local anesthetic: 50 cc MILEY    FLUIDS IN THE CASE: 1500 ml    ESTIMATED BLOOD LOSS: Minimal    URINE OUTPUT: 0 ml    COMPLICATIONS: none    CONDITION ON RETURN TO RECOVERY ROOM: Good      Findings:     ARTICULAR CARTILAGE LESION(S):  Medial Femoral Condyle: ICRS Grade 4A      Size:  3 x 6 cm  Medial tibial plateau: ICRS Grade 2      Size: 2.5 x 2.5 cm        Lateral Femoral Condyle: ICRS Grade 0      Size: none  Lateral tibial plateau: ICRS Grade 2      Size: 2.0 x 2.0 cm        Patellar surface: ICRS Grade 4A      Size: 3.0 x 3.0 cm  Trochlear groove: ICRS Grade 4A      Size: 3.5 x 3.5 cm      EXAMINATION UNDER ANESTHESIA:   Extension 0 degrees  Flexion 140 degrees  Lachman Maneuver:  Negative  Anterior Drawer: Negative  Pivot Shift: Negative  Posterior Drawer:  Negative  Varus stability @ 30 degrees: 0  Valgus stability @ 30 degrees: 0  Patellar glide:1 quadrant lateral, 2 quadrant medial      Meniscal status:  Medial meniscus:   Intact  Tear location:  none    Lateral meniscus:   Intact  Tear location:  none         Expand All  Collapse All       IMPLANTS UTILIZED: Linvatec equipment, MTF sizers, Acumed 2.5 x 16 mm screws x 2    GRAFT SOURCE:  MOPS patella and femoral grafts    INDICATIONS FOR OPERATIVE PROCEDURE: Ab Girard is a 29 y.o.  male with history of right knee pain and pathology. The patient's history and physical examination findings consistent with the procedure performed. He noted significant problems in the area of concern with problems on activities of daily living and aggressive use of the right leg. As a result of these problems and problems with overall activity level, the patient was deemed to be an appropriate candidate for operative intervention. Nonoperative versus operative options were discussed. The risks and benefits were discussed with the patient. The patient acknowledged understanding and wished to proceed with operative intervention. Informed consent was obtained prior to the procedure. Details of the surgical procedure were explained, including incisions and probable rehabilitation course. The patient understands the likely length of convalescence after surgery; and we have explained the risks, benefits, and alternatives of surgery. Reasonable expectations and potential complications were discussed and acknowledged, including but not limited to infection, bleeding, blood clots, (DVT and/or PE), nerve injury, retear, instability, continued pain and stiffness. It was also explained that there was a chance of failure which may require further management. The patient agreed and understood and wished to proceed.       DESCRIPTION OF PROCEDURE: The patient was brought into the Operating Room and placed in supine position. Upon application of Regional w/ catheter  block in the preoperative holding area, the patient underwent General to stabilize the airway. The patient was given the appropriate dose of antibiotics based on body weight. Timeout was utilized to verify the right side as the operative side. Both upper  extremities were placed in comfortable position. Examination under anesthesia was performed. The nonoperative leg was carefully padded along the heel and peroneal nerve regions and maintained flat on the table. The operative leg was then stabilized with a lateral post for intra-operative positioning as well as a popliteal post placed at the mid-calf level.  A bump was placed under the hip on the operative side. The operative leg was prepped and draped in a sterile fashion with ChloraPrep material.    We injected 0.5% ropivacaine mixture into the anterolateral and anteromedial aspect of the knee with application of 15 mL per portal site. A #11 blade was used to make the arthroscopic portals. Blunt trocar and sheath were inserted into the intercondylar notch and then subsequently into the suprapatellar pouch. This patellofemoral joint was visualized, demonstrating moderate lateral patellar tilt, moderate patellar subluxation. The patellar tracked laterally with flexion and extension. Arthroscopic pictures were obtained. There was articular cartilage damage was present in the patellofemoral compartment as noted in the Findings section of this operative report. The lesion if present was treated with arthroscopic chondroplasty technique removing all irregular edges and flaps of articular cartilage at the lesion.    Attention was turned to the intercondylar notch where the anterior cruciate ligament (ACL) and posterior cruciate ligament (PCL) structures were visualized. Visualization demonstrated an intact ACL and an intact PCL. Probe analysis revealed no signs of occult pathology within the ligamentous structures.     Attention was then turned to the lateral compartment. The patient demonstrated an intact lateral meniscus with probe analysis demonstrating no occult tears or pathology Arthroscopic instrumentation was used to veify no tear and pictures obtained. articular cartilage damage was present in the lateral  compartment as noted in the Findings section of this operative report. The lesion if present was treated witharthroscopic chondroplasty technique removing all irregular edges and flaps of articular cartilage at the lesion.    Attention was then turned to the medial compartment. The patient demonstrated an intact medial meniscus with probe analysis demonstrating no occult tears or pathology Arthroscopic instrumentation was used to  veify no tear and pictures obtained. There was articular cartilage damage was present in the medial compartment as noted in the Findings section of this operative report. The lesion if present was treated with arthroscopic chondroplasty technique removing all irregular edges and flaps of articular cartilage at the lesion.    Arthroscopic loose bodies were removed from the suprapatellar pouch, medial and lateral gutters as well as the intercondylar notch and posterior regions of the knee.     Tubercleplasty:   A lateral based incision was carried down through the skin down the fat and fascia, measuring approximately 8 cm in length. Fascia was then raised superiorly and inferiorly as well as medially and laterally along the area of concern. We then used a needle tip Bovie cautery to carefully release the tibialis anterior fascia and muscle directly off the anterolateral aspect of the proximal tibia. We then exposed the lateral retinacular region releasing the lateral retinacular structures leaving the vastus lateralis muscle intact with a subvastus type approach. Medially, we exposed the area of the tibial tubercleplasty. We used Bovie cautery and released along the medial retinacular region to expose the area of the normal insertion of the patellar tendon into the tibial tubercle region.      We demarcated an area of 8 cm in length and then used the Rose Mary guide and it was pinned in position and two screws applied verifying the lateral cortex was not going to be disrupted. The guide was  screwed into position with unicortical screws per technique. The attachement from the set was then applied and an additional screw used to stabilize this cutting guide. We then used the TPS saw to cut the lateral aspect and maintained a 2 mm wall of lateral cortical bone. We applied the out  device and placed this medially based on preoperative measurements allowing a cut of 10 mm. The medial cut was created and tapered distally to allow for medialization of the tubercle. The bone wedge created was removed and we medialized 10 mm proximally. The tubercle was cut distally for 1 cm to allow for distalization of 10 mm.       OCA transplantation:  Conversion to open procedure for osteochondral transplantation. Arthroscopic instrumentation was removed from the knee and a lateral based arthrotomy was performed with lateral subvastus approach performed. Open lateral retinacular lengthening was performed simultaneously to allow for decreased tension to the patellar region on closure as well. The patella was medially along with the tibial tubercle and with this exposure demonstrated the above lesion. The MTF sizers were used to demonstrate the lesion size of 30 mm in diameter at the central aspect of the medial femoral condyle. The area was drilled centrally and the pin location verified. We reamed to a depth of 8-10 mm and then used the defect impacter in the area.     Graft preparation was performed and we used the sterile MOPS graft which was taken out of its media and placed into NS at room temperature. The graft was prepared with a size of 30 x 8-10 mm and the contoured with a rougeur and carbojet used to remove donor blood elements. The graft was taken to the operative field and hand impacted into position. Excellent fit and fill was noted; pictures were obtained. The knee was taken through a range of motion program and demonstrated normal tracking without palpable or audible crepitus. Stability was assess and  based on intra-operative findings we need to repair the preexistent lateral retinacular release to provide appropriate stability and tracking.     The MTF sizers were used to demonstrate the lesion size of 25 mm in diameter at the central aspect of the medial femoral condyle. The area was drilled centrally and the pin location verified. We reamed to a depth of 8 mm and then used the defect impacter in the area. The base was drilled to promote blood flow.     Graft preparation was performed and we used the sterile MOPS graft which was taken out of its media and placed into NS at room temperature. The graft was prepared with a size of 25 x 8 mm and the contoured with a rougeur and carbojet used to remove donor blood elements. The graft was taken to the operative field and hand impacted into position. Excellent fit and fill was noted; pictures were obtained.     The MTF sizers were used to demonstrate the lesion size of 35 mm in diameter at th central aspect of the trochlea. The area was drilled centrally and the pin location verified. We reamed to a depth of 10-12 mm and then used the defect impacter in the area.     Graft preparation was performed and we used the sterile MOPS graft which was taken out of its media and placed into NS at room temperature. The graft was prepared with a size of 35 x 10-12 mm and the contoured with a rougeur and carbojet used to remove donor blood elements. The graft was taken to the operative field and hand impacted into position. Excellent fit and fill was noted; pictures were obtained.     The MTF sizers were used to demonstrate the lesion size of 30 mm in diameter at the central aspect of the patella. The area was drilled centrally and the pin location verified. We reamed to a depth of 10 mm and then used the defect impacter in the area.     Graft preparation was performed and we used the sterile MOPS graft which was taken out of its media and placed into NS at room temperature. The  graft was prepared with a size of 30 x 10 mm and the contoured with a rougeur and carbojet used to remove donor blood elements. The graft was taken to the operative field and hand impacted into position. Excellent fit and fill was noted; pictures were obtained.       The tubercle with attached Kinnemed guide was pinned and a lateral view demonstrated excellen recreation of normal anatomy with Blumensaat's line bisecting the inferior pole of the patella. The wedge was placed laterally and tamped into position. The Kinnemed guide was removed and we then used the Synthes 3.2 drill bit and drilled distally first . We then removed the distal drill bit and overdrilled with a 4.5 drill bit, used the countersink maneuver depth gauge and placed a 4.5 x 48 mm screw. This process was repeated more proximally with a 4.5 x 54 mm screw placed in this area as well.     C-arm was used to verify appropriate location. We recreated a normal anatomy   of the knee with 30 degrees and assessment on lateral view demonstrating a   normal dissection of Blumensaat's line with the inferior pole of the patella.   We then irrigated gently along the area of concern, placed a drain at the   lateral aspect of the soft tissue structures. We then closed the tibialis   anterior fascia and retinacular structures medially and laterally with a series   of #1 Vicryl sutures placed in figure-of-eight fashion. We did place   demineralized cortical fibrous from [facility] [facility] along   the periphery of the tubercleplasty to create appropriate bone healing   postoperatively prior to closure of the tibialis anterior fascia. Subcutaneous   tissues were closed with 3-0 Vicryl sutures placed in inverted fashion. Skin   was closed with running 4-0 Monocryl, sutures placed in subcuticular fashion   along with the application of Dermabond ointment and an aquacel bandage.   We did place the anterior muscular and anterior tissue MILEY mixture into  the knee. With a 20-gauge spinal needle prior to closure of the soft tissue structures for   postoperative pain control. We applied Xeroform gauze, ABD pads, cast padding   and long-leg MARLON hose stocking and cooling unit as well as sterile electrode   pads proximally and distally. The patient's knee was placed into a hinged knee   immobilizer, which was locked in -10 degrees hyperextension and allowed to flex   at rest at 45 degrees. The patient was allowed to recover from the anesthetic.   The LMA was removed. The patient was taken to Recovery Room in stable   condition. At the completion of the case, all instrument and sponge counts were  Correct.     Final arthroscopic pictures were obtained. Fluid was extravasated from the joint.  tendon. Xeroform was applied along with application of sterile electrodes proximally and distally, gauze, ABD pads,cast padding, long-leg MARLON hose stocking and cooling unit. The patient's knee was placed into a hinged immobilizer, which was locked in -10 degrees extension and allowed the flexion to 50 degrees. The patient was then allowed to recover from anesthesia.  General was removed. The patient was taken to Recovery Room in  Good condition. At the completion case, all instrument and sponge counts were correct.    NOTE: I was present and scrubbed for the key portions of the procedure.    PHYSICAL THERAPY:  The patient should begin physical therapy on postoperative   day # 3 and will be advanced to outpatient therapy as soon as   Possible following discharge.  Weight bearing:toe touch weight bearing to 25% PWB  right leg  Range of Motion:limited to -10 degrees extension and 50 degrees flexion    Brace ROM:  Week 1:    -10-20 degrees  Week 2-3: -10-45/60 degrees  Week 3-4: -10-90 degrees  Week 4-5: - degrees  Week 5-6: - degrees    Weightbearing: All WB immobilizer locked in extension with gait for 6 weeks  Week 1-2: Toe touch to 20% weightbearing  Week 2-3: 25-50% PWB    Week 4-5 : 50% to full weightbearing  Week 6 - transition out of immobilizer    Exercycle and elliptical initiated at 6-8 weeks  CORE program at 6-8 weeks    No open chain rehabilitation for 3 months  Limited open chain rehab. 3-4 months    Running and cutting exercises at 4-6 months based on single leg balance ability  Full return planned at 9 months  [facility]      If the CPM is required the patient is to be taken out of the CPM machine as much as possible.    Additional exercises to be performed are:   to begin quad sets with a heel roll to obtain hyperextension, straight leg raise and heel slides with the heel supported in a closed-chain fashion    Immediate specific exercises should include:   Gait program should include the above stated weightbearing; in addition: protected gait following symptoms of pain and swelling    Immobilizer if present should be locked @ -10 degrees with gait and allowed to flex to 50 degrees at rest.     Discharge summary:  The patient was discharged to home in Good  Follow-up as scheduled preoperatively.    Medication(s): Refer to Discharge Medication List         Resume preoperative diet as tolerated    Activity per outpatient discharge instruction sheet     Significant Surgical Tasks Conducted by the Assistant(s), if Applicable: preparation and closure    Estimated Blood Loss (EBL): * No values recorded between 6/25/2024 10:12 AM and 6/25/2024  2:04 PM *           Implants:   Implant Name Type Inv. Item Serial No.  Lot No. LRB No. Used Action   MD3T RON PIN 2.4MM    KINAMED  Right 1 Implanted and Explanted   MD3T FIXATION SCREW 4.5X 20MM    KINAMED  Right 1 Implanted and Explanted   MD3T FIXATION SCREW 4.5X26MM    KINAMED  Right 1 Implanted and Explanted   [id number]  ALLOGRAFT OSTEOCHNDRL PAT R [id number] MTF  Right 1 Implanted   [id number]  ALLOGRAFT DST FEM WHL R [id number] MTF  Right 1 Implanted   [id number]  FIBER  CORTICAL ENHNC [facility] [id number] MTF  Right 1 Implanted   SCREW CORTEX 4.5 46M - [id number]  SCREW CORTEX 4.5 46M  SYNTHES  Right 1 Implanted   SCREW 4.5MM CRTX 54MM SLTP - [id number]  SCREW 4.5MM CRTX 54MM SLTP  SYNTHES  Right 1 Implanted   0.9 kwire    [employer]  Right 3 Implanted and Explanted   micro AT3 16mm screw    [employer]  Right 2 Implanted   micro AT3 18mm screw    [employer]  Right 1 Implanted and Explanted       Specimens:   Specimen (24h ago, onward)      None                    Condition: Good    Disposition: PACU - hemodynamically stable.    Attestation: I was present and scrubbed for the key portions of the procedure.    Discharge Note    OUTCOME: Patient tolerated treatment/procedure well without complication and is now ready for discharge.    DISPOSITION: Home or Self Care    FINAL DIAGNOSIS:  <principal problem not specified>    FOLLOWUP: In clinic    DISCHARGE INSTRUCTIONS:  No discharge procedures on file.

## 2024-06-26 NOTE — ANESTHESIA POSTPROCEDURE EVALUATION
Anesthesia Post Evaluation    Patient: Ab Girard    Procedure(s) Performed: Procedure(s) (LRB):  INSERTION,ALLOGRAFT,OSTEOCHONDRAL (Right)  TUBERCLEPLASTY, TIBIA, ARTHROSCOPY-ASSISTED (Right)  RELEASE, KNEE, LATERAL RETINACULA (Right)  CHONDROPLASTY, KNEE (Right)  REMOVAL, LOOSE BODY, JOINT, ARTHROSCOPIC (Right)    Final Anesthesia Type: general      Patient location during evaluation: PACU  Patient participation: Yes- Able to Participate  Level of consciousness: awake and alert  Post-procedure vital signs: reviewed and stable  Pain management: adequate  Airway patency: patent  LAURO mitigation strategies: Multimodal analgesia  PONV status at discharge: No PONV  Anesthetic complications: no      Cardiovascular status: blood pressure returned to baseline  Respiratory status: unassisted  Hydration status: euvolemic  Follow-up not needed.              Vitals Value Taken Time   /62 06/25/24 1547   Temp 36.8 °C (98.2 °F) 06/25/24 1545   Pulse 60 06/25/24 1557   Resp 63 06/25/24 1557   SpO2 96 % 06/25/24 1557   Vitals shown include unfiled device data.      Event Time   Out of Recovery 15:04:00         Pain/Clark Score: Pain Rating Prior to Med Admin: 4 (6/25/2024  3:02 PM)  Clark Score: 10 (6/25/2024  4:01 PM)

## 2024-06-26 NOTE — ANESTHESIA POST-OP PAIN MANAGEMENT
"Acute Pain Service Progress Note    6/26/2024 0926    Patient contacted regarding CADD infusion follow up. Reports that pain has been well controlled with the infusion pump. Denies signs of local anesthetic toxicity. PNC dressing remains clean, dry, and intact. All questions answered. Reminded patient that the infusion should be discontinued and PNC removed whenever the "infusion complete" alert is seen on the display screen or by POD 5 (6/30/24) at 12pm, whichever comes first. Encouraged patient to call the CADD 24/7 support line at [phone] or the [facility] Anesthesia line at [phone] for any CADD related questions/issues. Verbalizes understanding.            "

## 2024-06-27 ENCOUNTER — PATIENT MESSAGE (OUTPATIENT)
Dept: SPORTS MEDICINE | Facility: CLINIC | Age: 29
End: 2024-06-27
Payer: COMMERCIAL

## 2024-06-30 DIAGNOSIS — M23.91 INTERNAL DERANGEMENT OF RIGHT KNEE: ICD-10-CM

## 2024-06-30 DIAGNOSIS — M94.261 CHONDROMALACIA OF RIGHT KNEE: ICD-10-CM

## 2024-06-30 DIAGNOSIS — M25.361 PATELLAR INSTABILITY OF RIGHT KNEE: ICD-10-CM

## 2024-07-01 ENCOUNTER — CLINICAL SUPPORT (OUTPATIENT)
Dept: REHABILITATION | Facility: HOSPITAL | Age: 29
End: 2024-07-01
Attending: ORTHOPAEDIC SURGERY
Payer: COMMERCIAL

## 2024-07-01 DIAGNOSIS — M23.92 PATELLAR MALALIGNMENT SYNDROME OF LEFT KNEE: ICD-10-CM

## 2024-07-01 DIAGNOSIS — M23.91 PATELLAR MALALIGNMENT SYNDROME OF RIGHT KNEE: ICD-10-CM

## 2024-07-01 PROCEDURE — 97161 PT EVAL LOW COMPLEX 20 MIN: CPT | Performed by: PHYSICAL THERAPIST

## 2024-07-01 PROCEDURE — 97140 MANUAL THERAPY 1/> REGIONS: CPT | Performed by: PHYSICAL THERAPIST

## 2024-07-01 PROCEDURE — 97110 THERAPEUTIC EXERCISES: CPT | Performed by: PHYSICAL THERAPIST

## 2024-07-01 RX ORDER — OXYCODONE HYDROCHLORIDE 5 MG/1
TABLET ORAL
Qty: 28 TABLET | Refills: 0 | Status: SHIPPED | OUTPATIENT
Start: 2024-07-01

## 2024-07-01 NOTE — PLAN OF CARE
[facility]   Physical Therapy Initial Evaluation      Name: Ab Girard  [facility] Number: [id number]    Therapy Diagnosis:   Encounter Diagnoses   Name Primary?    Patellar malalignment syndrome of right knee     Patellar malalignment syndrome of left knee         Physician: Caroline Quintero MD    Physician Orders: PT Eval and Treat s/p R knee MOPS w/ tibial tubercleplasty  Medical Diagnosis from Referral: Patellar malalignment syndrome of right knee [M23.91], Patellar malalignment syndrome of left knee [M23.92]   Evaluation Date: 7/1/2024  Authorization Period Expiration: 12/31/24  Plan of Care Expiration: 4/1/25  Progress Note Due: 8/1/24  Visit # / Visits authorized: 1/ 1   FOTO: 1/3    Procedure: PROCEDURES(S) PERFORMED: Complex  1. Right Osteochondral allograft knee, open 55890  2.  Right Anterior tibial tubercleplasty 76332  3.  Right 16867 Lateral retinacular release, open  4. Right 57999 Arthroscopy, knee, for removal of loose body or foreign body  5. Right 42718 Arthroscopy, debridement/shaving of articular cartilage (Chondroplasty)  6. Right 05678 Arthroscopy, with lysis of adhesions    Precautions: Standard     Time In: 1000  Time Out: 1100  Total Appointment Time (timed & untimed codes): 60 minutes    PHYSICAL THERAPY:  The patient should begin physical therapy on postoperative   day # 3 and will be advanced to outpatient therapy as soon as   Possible following discharge.  Weight bearing:toe touch weight bearing to 25% PWB  right leg  Range of Motion:limited to -10 degrees extension and 50 degrees flexion     Brace ROM:  Week 1:    -10-20 degrees  Week 2-3: -10-45/60 degrees  Week 3-4: -10-90 degrees  Week 4-5: - degrees  Week 5-6: - degrees     Weightbearing: All WB immobilizer locked in extension with gait for 6 weeks  Week 1-2: Toe touch to 20% weightbearing  Week 2-3: 25-50% PWB   Week 4-5 : 50% to full weightbearing  Week 6 - transition out of immobilizer      Exercycle and elliptical initiated at 6-8 weeks  CORE program at 6-8 weeks     No open chain rehabilitation for 3 months  Limited open chain rehab. 3-4 months     Running and cutting exercises at 4-6 months based on single leg balance ability  Full return planned at 9 months  (Verify with [facility] rehab. Protocol)     Subjective     Date of onset: DOI 1 year ago, DOS 6/25/24    History of current condition - Ab reports: insidious onset of R knee pain about 1 year ago. Pt reports about 3 years ago he started getting more serious into body building, lifting heavier loads but keeping his cardio relatively low impact. Pt endorses inc in pain with deeper squats that eventually progressed to transfers. Pt reports he has experienced patellar subluxations with ADL's and working out but was not too frequent. After failing conservative treatment, pt underwent the above procedure. Pt denies any complications after surgery other than post op pain. Pt would like to get back to working, ADL's and lifting weights at [employer]    Falls: n/a    Imaging: CT scan: 5/1/24:  Impression:     1. Tricompartmental osteophyte production.  2. Tibial tubercle trochlear groove distance measures 2.1 cm bilaterally.    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  Prior Level of Function: Pt independent with all ADLs, job responsibilities and participating in regular physical activity   Current Level of Function: Pt limited with ADL's, job duties and avoiding lifting weights at PLOF    Pain:  Current 6/10, worst 7/10, best 2/10   Location: right knee   Description: Aching, Dull, Tight, Sharp, and Shooting  Aggravating Factors: Eating, Walking, Night Time, Extension, Lifting, and Getting out of bed/chair  Easing Factors: pain medication, ice, and rest    Patients goals: get back to working, ADL's and lifting weights at [employer]     Medical History:   No past medical history on file.    Surgical History:   Ab Girard  has a  past surgical history that includes Tonsillectomy; Mauricio procedure; insertion, allograft, osteochondral (Right, 6/25/2024); tubercleplasty-arthroscopic (Right, 6/25/2024); Lateral retinacula release of knee (Right, 6/25/2024); Chondroplasty of knee (Right, 6/25/2024); and Arthroscopic removal of loose body from joint (Right, 6/25/2024).    Medications:   Ab has a current medication list which includes the following prescription(s): aspirin, aspirin, celecoxib, ergocalciferol, methocarbamol, multivitamin, fish oil-omega-3 fatty acids, oxycodone, pregabalin, and promethazine.    Allergies:   Review of patient's allergies indicates:  No Known Allergies     Objective      Observation: Pt presents NWB on R LE w/ bilat axillary crutches t-scope locked in ext. Incision covered with aquacell, no drainage. Mod errythemia medial to aquacell, mod inc tissue temperature to calf, otherwise no symptoms of infection or DVT    Range of Motion:   Knee Right Left   Active 0-3-NT* 0-3-148   Passive 0-3-45 0-3-148     Function:    - quad set: fair, improves w/ NMES   - Sit <--> Stand:Pt transfers on L LE   - Bed Mobility: modified independent    Edema: moderate    Joint Mobility: patellar mobility restricted on R as expected post-op.    Palpation:  Pt grossly TTP as expected post-op.    Sensation: Intact but diminished 2/2 residual nerve block.     Intake Outcome Measure for FOTO Knee Survey    Therapist reviewed FOTO scores for Ab Girard on 7/1/2024.   FOTO documents entered into [employer] - see Media section.       Treatment     Total Treatment time (time-based codes) separate from Evaluation: 31 minutes     Ab received the treatments listed below:      therapeutic exercises to develop strength, endurance, ROM, flexibility, posture, and core stabilization for 21 minutes including:    LLLD into extension w/ heel prop 5 min  HS strap stretch off EOT 2x30 sec  Gastroc strap stretch 2x30 sec  Quad set NMES 10 min  Education on post op  precautions, gait mechanics, POC, prognosis, return to activity     manual therapy techniques: Joint mobilizations were applied to the: R knee for 10 minutes, including:    Patellar mobs  Scar mob   Knee flexion off EOT  Knee ext hinge stretch    Patient Education and Home Exercises     Education provided:   - post op precautions, gait mechanics, POC, prognosis, return to activity     Written Home Exercises Provided: yes. Exercises were reviewed and Ab was able to demonstrate them prior to the end of the session.  Ab demonstrated good  understanding of the education provided. See EMR under Patient Instructions for exercises provided during therapy sessions.    Assessment     Ab is a 29 y.o. male referred to outpatient Physical Therapy with a medical diagnosis of s/p retropatellar MOPS w/ tibial tubercleplasty. Pt presents with decreased strength, decreased ROM, decreased flexibility, faulty posture,  and increased pain. Due to impairments, pt is unable to ambulate, perform ADL's/ job duties or tolerate exercise routine at [facility].    Patient prognosis is Good.   Patient will benefit from skilled outpatient Physical Therapy to address the deficits stated above and in the chart below, provide patient /family education, and to maximize patientt's level of independence.     Plan of care discussed with patient: Yes  Patient's spiritual, cultural and educational needs considered and patient is agreeable to the plan of care and goals as stated below:     Anticipated Barriers for therapy: transportation/ work schedule    Medical Necessity is demonstrated by the following  History  Co-morbidities and personal factors that may impact the plan of care [x] LOW: no personal factors / co-morbidities  [] MODERATE: 1-2 personal factors / co-morbidities  [] HIGH: 3+ personal factors / co-morbidities    Moderate / High Support Documentation:   Co-morbidities affecting plan of care: See medical chart    Personal Factors:   no  deficits     Examination  Body Structures and Functions, activity limitations and participation restrictions that may impact the plan of care [x] LOW: addressing 1-2 elements  [] MODERATE: 3+ elements  [] HIGH: 4+ elements (please support below)    Moderate / High Support Documentation:      Clinical Presentation [x] LOW: stable  [] MODERATE: Evolving  [] HIGH: Unstable     Decision Making/ Complexity Score: low       Goals:  Short Term Goals: 3 weeks     Pt will be independent with HEP and report compliance at least 6 days/week  Pt will demonstrate full passive knee extension and knee flexion to at least 90 deg  Pt will be able to perform 10 SLR without extension lag to demonstrate improved quad strength for gait    Long term goals: within 12 weeks    Pt will be able to amb 30 min on level surface without gait deviations  Pt will be able to perform sit<>stand to standard chair x10 with equal weight shift  Pt will be able to perform all ADL's/ job responsibilities at PLO[employer] without pain   Pt will demonstrate full symmetrical knee ROM reporting no pain at end range    Long Term Goals: 9 months  Pt will demonstrate no less than 90% quad strength symmetry assessed isometrically  Pt will be able to perform all weight lifting exercises at [facility] reporting no pain  Pt will be able pass vail sports cord testing to demonstrate sufficient strength/ endurance for return to full activity    Plan     Plan of care Certification: 7/1/2024 to 4/1/25.    Outpatient Physical Therapy 2 times weekly for 9 months to include the following interventions: Aquatic Therapy, Cervical/Lumbar Traction, Electrical Stimulation TENS/NMES, Gait Training, Manual Therapy, Moist Heat/ Ice, Neuromuscular Re-ed, Patient Education, Self Care, Therapeutic Activities, Therapeutic Exercise, and Dry Needling.     Patrice Ayala, PT

## 2024-07-03 ENCOUNTER — PATIENT MESSAGE (OUTPATIENT)
Dept: REHABILITATION | Facility: HOSPITAL | Age: 29
End: 2024-07-03

## 2024-07-03 ENCOUNTER — CLINICAL SUPPORT (OUTPATIENT)
Dept: REHABILITATION | Facility: HOSPITAL | Age: 29
End: 2024-07-03
Payer: COMMERCIAL

## 2024-07-03 ENCOUNTER — OFFICE VISIT (OUTPATIENT)
Dept: SPORTS MEDICINE | Facility: CLINIC | Age: 29
End: 2024-07-03
Payer: COMMERCIAL

## 2024-07-03 DIAGNOSIS — M25.361 PATELLAR INSTABILITY OF RIGHT KNEE: ICD-10-CM

## 2024-07-03 DIAGNOSIS — M25.561 ACUTE PAIN OF RIGHT KNEE: Primary | ICD-10-CM

## 2024-07-03 DIAGNOSIS — M23.91 PATELLAR MALALIGNMENT SYNDROME OF RIGHT KNEE: ICD-10-CM

## 2024-07-03 DIAGNOSIS — M22.41 CHONDROMALACIA OF RIGHT PATELLA: Primary | ICD-10-CM

## 2024-07-03 DIAGNOSIS — M23.91 INTERNAL DERANGEMENT OF RIGHT KNEE: ICD-10-CM

## 2024-07-03 PROCEDURE — 99999 PR PBB SHADOW E&M-EST. PATIENT-LVL I: CPT | Mod: PBBFAC,,, | Performed by: ORTHOPAEDIC SURGERY

## 2024-07-03 PROCEDURE — 97110 THERAPEUTIC EXERCISES: CPT | Performed by: PHYSICAL THERAPIST

## 2024-07-03 PROCEDURE — 97140 MANUAL THERAPY 1/> REGIONS: CPT | Performed by: PHYSICAL THERAPIST

## 2024-07-03 PROCEDURE — 97112 NEUROMUSCULAR REEDUCATION: CPT | Performed by: PHYSICAL THERAPIST

## 2024-07-03 PROCEDURE — 99024 POSTOP FOLLOW-UP VISIT: CPT | Mod: S$GLB,,, | Performed by: ORTHOPAEDIC SURGERY

## 2024-07-03 RX ORDER — SULFAMETHOXAZOLE AND TRIMETHOPRIM 800; 160 MG/1; MG/1
1 TABLET ORAL 2 TIMES DAILY
Qty: 20 TABLET | Refills: 0 | Status: SHIPPED | OUTPATIENT
Start: 2024-07-03

## 2024-07-03 NOTE — PROGRESS NOTES
[facility]   Physical Therapy Treatment Note      Name: Ab Girard  [facility] Number: [id number]    Therapy Diagnosis:   Encounter Diagnosis   Name Primary?    Acute pain of right knee Yes     Physician: Caroline Quintero MD    Visit Date: 7/3/2024    Physician Orders: PT Eval and Treat s/p R knee MOPS w/ tibial tubercleplasty  Medical Diagnosis from Referral: Patellar malalignment syndrome of right knee [M23.91], Patellar malalignment syndrome of left knee [M23.92]   Evaluation Date: 7/1/2024  Authorization Period Expiration: 12/31/24  Plan of Care Expiration: 4/1/25  Progress Note Due: 8/1/24  Visit # / Visits authorized: 1/ 10  FOTO: 1/3     Procedure: PROCEDURES(S) PERFORMED: 7/1/24  1. Right Osteochondral allograft knee, open 96750  2.  Right Anterior tibial tubercleplasty 61449  3.  Right 14444 Lateral retinacular release, open  4. Right 88293 Arthroscopy, knee, for removal of loose body or foreign body  5. Right 91853 Arthroscopy, debridement/shaving of articular cartilage (Chondroplasty)  6. Right 31005 Arthroscopy, with lysis of adhesions     Precautions: Standard      Time In: 1000  Time Out: 1100  Total Appointment Time (timed & untimed codes): 60 minutes     PHYSICAL THERAPY:  The patient should begin physical therapy on postoperative   day # 3 and will be advanced to outpatient therapy as soon as   Possible following discharge.  Weight bearing:toe touch weight bearing to 25% PWB  right leg  Range of Motion:limited to -10 degrees extension and 50 degrees flexion     Brace ROM:  Week 1:    -10-20 degrees  Week 2-3: -10-45/60 degrees  Week 3-4: -10-90 degrees  Week 4-5: - degrees  Week 5-6: - degrees     Weightbearing: All WB immobilizer locked in extension with gait for 6 weeks  Week 1-2: Toe touch to 20% weightbearing  Week 2-3: 25-50% PWB   Week 4-5 : 50% to full weightbearing  Week 6 - transition out of immobilizer     Exercycle and elliptical initiated at 6-8  weeks  CORE program at 6-8 weeks     No open chain rehabilitation for 3 months  Limited open chain rehab. 3-4 months     Running and cutting exercises at 4-6 months based on single leg balance ability  Full return planned at 9 months  (Verify with [facility] rehab. Protocol)     Time In: 1215  Time Out: 115  Total Billable Time: 60 minutes    Subjective     Pt reports: feeling okay since eval. Pain well controlled, pt adhering to WB precautions, good compliance w/ HEP. Slightly more redness around incision site.  He was compliant with home exercise program.  Response to previous treatment: /Functional change: tolerated eval well    Pain: 3/10  Location: right knee      Objective      Observation: Pt presents NWB on R LE w/ bilat axillary crutches t-scope locked in ext. Incision covered with aquacell, no drainage. Mod errythemia medial to aquacell extending past line tracing swelling on 7/1/24. Mod inc tissue temperature to calf, otherwise no symptoms of infection or DVT     Range of Motion:   Knee Right Left   Active 0-3-NT 0-3-148   Passive 0-3-60* 0-3-148      *Measured off EOT    Treatment     Ab received the treatments listed below:      therapeutic exercises to develop strength, endurance, ROM, flexibility, posture, and core stabilization for 31 minutes including:     LLLD into extension w/ heel prop 5 min  HS strap stretch off EOT 2x30 sec  Gastroc strap stretch 2x30 sec  Heel slides 5 min x2  Seated chair LE ergometer 5 min for cartilage health/ knee flexion ROM  Education on post op precautions, gait mechanics, POC, prognosis, return to activity      manual therapy techniques: Joint mobilizations were applied to the: R knee for 14 minutes, including:     Patellar mobs  Scar mob   Knee flexion off EOT  Knee ext hinge stretch  T-scope/ crutches re fit    neuromuscular re-education activities to improve: Balance, Coordination, Kinesthetic, Sense, Proprioception, and Posture for 15 minutes. The following  "activities were included:    Quad set NMES 10"/20" duty cycle10 min  SL hip abduction w/ NMES to quad 5 min  SLR x10    therapeutic activities to improve functional performance for 0  minutes, including:      Patient Education and Home Exercises       Education provided:   - post op precautions, gait mechanics, POC, prognosis, return to activity     Written Home Exercises Provided: yes. Exercises were reviewed and Ab was able to demonstrate them prior to the end of the session.  Ab demonstrated good  understanding of the education provided. See EMR under Patient Instructions for exercises provided during therapy sessions    Assessment     Pt presents w/ good carryover of ROM from last visit. Rounded with Dr. Quintero about potential sx of infection, pt was evaluated and given antibiotic. Pt was cleared to flex knee to 60 deg passively. Pt tolerates seated ergometer well. Pt educated on HEP, WB and flexion precautions. Will continue to progress as tolerated per protocol     Ab Is progressing well towards his goals.   Pt prognosis is Good.     Pt will continue to benefit from skilled outpatient physical therapy to address the deficits listed in the problem list box on initial evaluation, provide pt/family education and to maximize pt's level of independence in the home and community environment.     Pt's spiritual, cultural and educational needs considered and pt agreeable to plan of care and goals.     Anticipated barriers to physical therapy: transportation    Goals:     Short Term Goals: 3 weeks      Pt will be independent with HEP and report compliance at least 6 days/week  Pt will demonstrate full passive knee extension and knee flexion to at least 90 deg  Pt will be able to perform 10 SLR without extension lag to demonstrate improved quad strength for gait     Long term goals: within 12 weeks     Pt will be able to amb 30 min on level surface without gait deviations  Pt will be able to perform sit<>stand " to standard chair x10 with equal weight shift  Pt will be able to perform all ADL's/ job responsibilities at [employer]LO[employer] without pain   Pt will demonstrate full symmetrical knee ROM reporting no pain at end range     Long Term Goals: 9 months  Pt will demonstrate no less than 90% quad strength symmetry assessed isometrically  Pt will be able to perform all weight lifting exercises at [facility] reporting no pain  Pt will be able pass vail sports cord testing to demonstrate sufficient strength/ endurance for return to full activity    Plan     Continue progressing ROM/ WB as tolerated per protocol    Patrice Ayala, PT

## 2024-07-03 NOTE — PROGRESS NOTES
Subjective:          Chief Complaint: Ab Girard is a 29 y.o. male who had no chief complaint listed for this encounter.    Ab Girard comes to clinic for unscheduled 1 week postoperative visit of the below procedures. His physical therapist Patrice Ayala came to clinic to report patient had increase in redness around incision. Patient has been compliant with all restrictions and is doing well. Taking medications as prescribed     DATE OF PROCEDURE: 6/25/2024     ATTENDING SURGEON: Surgeons and Role:     * Caroline Quintero MD - Primary     Assistants:  MD Alexa Tamez SMA     It was medically necessary for Akash Soto MD to perform first assistant duties aiding in setup, exposure and closure.     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 Right  Osteochondral allograft knee, open 11867  2.  Right  Anterior tibial tubercleplasty 67920  3.  Right  89226 Lateral retinacular release, open  4. Right  10046 Arthroscopy, knee, for removal of loose body or foreign body  5. Right  62033 Arthroscopy, debridement/shaving of articular cartilage (Chondroplasty)  6. Right  25529 Arthroscopy, with lysis of adhesions          Findings:      ARTICULAR CARTILAGE LESION(S):  Medial Femoral Condyle: ICRS Grade 4A                 Size:  3 x 6 cm  Medial tibial plateau: ICRS Grade 2                 Size: 2.5 x 2.5 cm        Lateral Femoral Condyle: ICRS Grade 0                 Size: none  Lateral tibial plateau: ICRS Grade 2                 Size: 2.0 x 2.0 cm       Patellar surface: ICRS Grade 4A                 Size: 3.0 x 3.0 cm  Trochlear groove: ICRS Grade 4A                 Size: 3.5 x 3.5  cm          Review of Systems   Constitutional: Negative for fever and night sweats.   HENT:  Negative for hearing loss.    Eyes:  Negative for blurred vision and visual disturbance.   Cardiovascular:  Negative for chest pain and leg swelling.   Respiratory:  Negative for shortness of breath.    Endocrine: Negative for polyuria.   Hematologic/Lymphatic: Negative for bleeding problem.   Skin:  Negative for rash.   Musculoskeletal:  Negative for back pain, joint pain, joint swelling, muscle cramps and muscle weakness.   Gastrointestinal:  Negative for melena.   Genitourinary:  Negative for hematuria.   Neurological:  Negative for loss of balance, numbness and paresthesias.   Psychiatric/Behavioral:  Negative for altered mental status.                    Objective:        General: Ab is well-developed, well-nourished, appears stated age, in no acute distress, alert and oriented to time, place and person.     General    Vitals reviewed.  Constitutional: He is oriented to person, place, and time. He appears well-developed and well-nourished. No distress.   HENT:   Mouth/Throat: No oropharyngeal exudate.   Eyes: Right eye exhibits no discharge. Left eye exhibits no discharge.   Pulmonary/Chest: Effort normal and breath sounds normal. No respiratory distress.   Neurological: He is alert and oriented to person, place, and time. He has normal reflexes. No cranial nerve deficit. Coordination normal.   Psychiatric: He has a normal mood and affect. His behavior is normal. Judgment and thought content normal.     General Musculoskeletal Exam   Gait: abnormal       Right Knee Exam     Inspection   Erythema: absent  Scars: present  Swelling: present  Effusion: absent  Deformity: absent  Bruising: present    Range of Motion   Extension:  0   Flexion:  30     Tests   Meniscus   Royal:  Medial - negative Lateral - negative  Ligament Examination   Lachman: normal (-1 to 2mm)   PCL-Posterior Drawer: normal (0 to 2mm)     MCL -  Valgus: normal (0 to 2mm)  LCL - Varus: normal  Pivot Shift: normal (Equal)  Reverse Pivot Shift: normal (Equal)  Dial Test at 30 degrees: normal (< 5 degrees)  Dial Test at 90 degrees: normal (< 5 degrees)  Posterior Sag Test: negative  Posterolateral Corner: stable  Patella   Patellar apprehension: negative  Passive Patellar Tilt: neutral  Patellar Tracking: normal  Patellar Glide (quadrants): Lateral - 1   Medial - 2  Q-Angle at 90 degrees: normal  Patellar Grind: negative  J-Sign: none    Other   Meniscal Cyst: absent  Popliteal (Baker's) Cyst: absent  Sensation: normal    Comments:  Incision clean, dry, no drainage, erythema  Sutures intact  No sign of infection  Mild swelling  Compartments soft  Neurovascular status intact in extremity        Left Knee Exam   Left knee exam is normal.    Inspection   Erythema: absent  Effusion: absent  Deformity: absent    Tenderness   The patient is experiencing no tenderness.     Range of Motion   Extension:  0   Flexion:  140     Tests   Meniscus   Royal:  Medial - negative Lateral - negative  Stability   Lachman: normal (-1 to 2mm)   PCL-Posterior Drawer: normal (0 to 2mm)  MCL - Valgus: normal (0 to 2mm)  LCL - Varus: normal (0 to 2mm)  Pivot Shift: normal (Equal)  Reverse Pivot Shift: normal (Equal)  Dial Test at 30 degrees: normal (< 5 degrees)  Dial Test at 90 degrees: normal (< 5 degrees)  Posterior Sag Test: negative  Posterolateral Corner: stable  Patella   Patellar apprehension: negative  Passive Patellar Tilt: neutral  Patellar Tracking: normal  Patellar Glide (Quadrants): Lateral - 1 Medial - 2  Q-Angle at 90 degrees: normal  Patellar Grind: negative  J-Sign: J sign absent    Other   Meniscal Cyst: absent  Popliteal (Baker's) Cyst: absent  Sensation: normal    Right Hip Exam     Tests   Maynor: negative  Left Hip Exam     Tests   Maynor: negative          Reflexes     Left Side  Achilles:  2+  Quadriceps:  2+    Right Side   Achilles:  2+  Quadriceps:   2+    Vascular Exam     Right Pulses  Dorsalis Pedis:      2+  Posterior Tibial:      2+        Left Pulses  Dorsalis Pedis:      2+  Posterior Tibial:      2+                  Assessment:       Encounter Diagnoses   Name Primary?    Chondromalacia of right patella Yes    Patellar instability of right knee     Internal derangement of right knee     Patellar malalignment syndrome of right knee           Plan:       1. RTC in 1 weeks with Dr. Caroline Quintero. IKDC, SF-12 and KOOS was not filled out today in clinic. Patient will not fill out IKDC, SF-12 and KOOS on return.    2. Medications: Refills of the following Rx were sent to patients preferred Pharmacy:  Bactrim 800mg     3. Physical Therapy: Continue/Begin: Continue at [facility] with Patrice   The patient should begin physical therapy on postoperative   day # 3 and will be advanced to outpatient therapy as soon as   Possible following discharge.  Weight bearing:toe touch weight bearing to 25% PWB  right leg  Range of Motion:limited to -10 degrees extension and 50 degrees flexion     Brace ROM:  Week 1:    -10-20 degrees  Week 2-3: -10-45/60 degrees  Week 3-4: -10-90 degrees  Week 4-5: - degrees  Week 5-6: - degrees     Weightbearing: All WB immobilizer locked in extension with gait for 6 weeks  Week 1-2: Toe touch to 20% weightbearing  Week 2-3: 25-50% PWB   Week 4-5 : 50% to full weightbearing  Week 6 - transition out of immobilizer    4. HEP: N/A    5. Procedures/Procedural Planning:   N/A    6. DME:  Tscope brace adjusted to -10 to 60     7. Work/Sport Status: not working at this time    8. Visit Summary: Patient has some redness surrounding incision, will send in Bactrim for 10 day supply. No signs of infection. He is overall doing well. Will see back in 1 week                           [facility] patient questionnaires have been collected today.

## 2024-07-05 ENCOUNTER — PATIENT MESSAGE (OUTPATIENT)
Dept: SPORTS MEDICINE | Facility: CLINIC | Age: 29
End: 2024-07-05
Payer: COMMERCIAL

## 2024-07-09 ENCOUNTER — CLINICAL SUPPORT (OUTPATIENT)
Dept: REHABILITATION | Facility: HOSPITAL | Age: 29
End: 2024-07-09
Payer: COMMERCIAL

## 2024-07-09 DIAGNOSIS — M25.561 ACUTE PAIN OF RIGHT KNEE: Primary | ICD-10-CM

## 2024-07-09 PROCEDURE — 97140 MANUAL THERAPY 1/> REGIONS: CPT | Performed by: PHYSICAL THERAPIST

## 2024-07-09 PROCEDURE — 97110 THERAPEUTIC EXERCISES: CPT | Performed by: PHYSICAL THERAPIST

## 2024-07-09 PROCEDURE — 97112 NEUROMUSCULAR REEDUCATION: CPT | Performed by: PHYSICAL THERAPIST

## 2024-07-09 NOTE — PROGRESS NOTES
[facility]   Physical Therapy Treatment Note      Name: Ab Girard  [facility] Number: [id number]    Therapy Diagnosis:   Encounter Diagnosis   Name Primary?    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 Right Osteochondral allograft knee, open 48518  2.  Right Anterior tibial tubercleplasty 75874  3.  Right 24623 Lateral retinacular release, open  4. Right 67272 Arthroscopy, knee, for removal of loose body or foreign body  5. Right 18138 Arthroscopy, debridement/shaving of articular cartilage (Chondroplasty)  6. Right 35622 Arthroscopy, with lysis of adhesions     Precautions: Standard      Time In: 1000  Time Out: 1100  Total Appointment Time (timed & untimed codes): 60 minutes     PHYSICAL THERAPY:  The patient should begin physical therapy on postoperative   day # 3 and will be advanced to outpatient therapy as soon as   Possible following discharge.  Weight bearing:toe touch weight bearing to 25% PWB  right leg  Range of Motion:limited to -10 degrees extension and 50 degrees flexion     Brace ROM:  Week 1:    -10-20 degrees  Week 2-3: -10-45/60 degrees  Week 3-4: -10-90 degrees  Week 4-5: - degrees  Week 5-6: - degrees     Weightbearing: All WB immobilizer locked in extension with gait for 6 weeks  Week 1-2: Toe touch to 20% weightbearing  Week 2-3: 25-50% PWB   Week 4-5 : 50% to full weightbearing  Week 6 - transition out of immobilizer     Exercycle and elliptical initiated at 6-8  "weeks  CORE program at 6-8 weeks     No open chain rehabilitation for 3 months  Limited open chain rehab. 3-4 months     Running and cutting exercises at 4-6 months based on single leg balance ability  Full return planned at 9 months  (Verify with [facility] rehab. Protocol)     Time In: 100  Time Out: 200  Total Billable Time: 60 minutes    Subjective     Pt reports: no inc in pain after last visit. Pt has been compliant with HEP and WB precautions   He was compliant with home exercise program.  Response to previous treatment: /Functional change: tolerated eval well    Pain: 3/10  Location: right knee      Objective      POD: 2 wks 1 days    Observation: Pt presents NWB on R LE w/ bilat axillary crutches t-scope locked in ext. Incision covered with aquacell, no drainage.. Mod inc tissue temperature to calf, otherwise no symptoms of infection or DVT     Range of Motion:   Knee Right Left   Active 0-3-NT 0-3-148   Passive 0-0-60* 0-3-148      *Measured off EOT    Treatment     Ab received the treatments listed below:      therapeutic exercises to develop strength, endurance, ROM, flexibility, posture, and core stabilization for 21 minutes including:     LLLD into extension w/ heel prop 5 min  HS strap stretch off EOT 3x30 sec  Gastroc strap stretch 3x30 sec  Heel slides 5 min x2  Education on post op precautions, gait mechanics, POC, prognosis, return to activity      manual therapy techniques: Joint mobilizations were applied to the: R knee for 14 minutes, including:     Patellar mobs  Scar mob   Knee flexion off EOT  Knee ext hinge stretch  T-scope/ crutches re fit    neuromuscular re-education activities to improve: Balance, Coordination, Kinesthetic, Sense, Proprioception, and Posture for 25 minutes. The following activities were included:    biofeedback 300-500mV threshold 10"/10" duty cycle   - quad set 5 min   - quad set on heel prop 5 min   - standing SLR 3x10   - SL hip abduction 5 min   - prone hip ext 5 " min    therapeutic activities to improve functional performance for 0  minutes, including:      Patient Education and Home Exercises       Education provided:   - post op precautions, gait mechanics, POC, prognosis, return to activity     Written Home Exercises Provided: yes. Exercises were reviewed and Ab was able to demonstrate them prior to the end of the session.  Ab demonstrated good  understanding of the education provided. See EMR under Patient Instructions for exercises provided during therapy sessions    Assessment     Pt presents w/ improved passive knee flexion, extension measured at 0 post manual/ self stretching. Pt tolerates biofeedback well today, improved quad tone following intervention. Pt educated on precautions / ROM limitations. Will continue to progress ROM/ quad tone as tolerated.    Ab Is progressing well towards his goals.   Pt prognosis is Good.     Pt will continue to benefit from skilled outpatient physical therapy to address the deficits listed in the problem list box on initial evaluation, provide pt/family education and to maximize pt's level of independence in the home and community environment.     Pt's spiritual, cultural and educational needs considered and pt agreeable to plan of care and goals.     Anticipated barriers to physical therapy: transportation    Goals:     Short Term Goals: 3 weeks      Pt will be independent with HEP and report compliance at least 6 days/week  Pt will demonstrate full passive knee extension and knee flexion to at least 90 deg  Pt will be able to perform 10 SLR without extension lag to demonstrate improved quad strength for gait     Long term goals: within 12 weeks     Pt will be able to amb 30 min on level surface without gait deviations  Pt will be able to perform sit<>stand to standard chair x10 with equal weight shift  Pt will be able to perform all ADL's/ job responsibilities at [employer] without pain   Pt will demonstrate full symmetrical  knee ROM reporting no pain at end range     Long Term Goals: 9 months  Pt will demonstrate no less than 90% quad strength symmetry assessed isometrically  Pt will be able to perform all weight lifting exercises at PLOF reporting no pain  Pt will be able pass vail sports cord testing to demonstrate sufficient strength/ endurance for return to full activity    Plan     Continue progressing ROM/ WB as tolerated per protocol    Patrice Ayala, PT

## 2024-07-10 ENCOUNTER — OFFICE VISIT (OUTPATIENT)
Dept: SPORTS MEDICINE | Facility: CLINIC | Age: 29
End: 2024-07-10
Payer: COMMERCIAL

## 2024-07-10 ENCOUNTER — HOSPITAL ENCOUNTER (OUTPATIENT)
Dept: RADIOLOGY | Facility: HOSPITAL | Age: 29
Discharge: HOME | End: 2024-07-10
Attending: PHYSICIAN ASSISTANT
Payer: COMMERCIAL

## 2024-07-10 VITALS
SYSTOLIC BLOOD PRESSURE: 132 MMHG | HEART RATE: 70 BPM | BODY MASS INDEX: 28.1 KG/M2 | WEIGHT: 226 LBS | DIASTOLIC BLOOD PRESSURE: 65 MMHG | HEIGHT: 75 IN

## 2024-07-10 DIAGNOSIS — Z09 SURGERY FOLLOW-UP EXAMINATION: ICD-10-CM

## 2024-07-10 DIAGNOSIS — M25.561 RIGHT KNEE PAIN, UNSPECIFIED CHRONICITY: ICD-10-CM

## 2024-07-10 DIAGNOSIS — M25.561 RIGHT KNEE PAIN, UNSPECIFIED CHRONICITY: Primary | ICD-10-CM

## 2024-07-10 PROCEDURE — 3078F DIAST BP <80 MM HG: CPT | Mod: CPTII,S$GLB,, | Performed by: PHYSICIAN ASSISTANT

## 2024-07-10 PROCEDURE — 73560 X-RAY EXAM OF KNEE 1 OR 2: CPT | Mod: TC,RT

## 2024-07-10 PROCEDURE — 99024 POSTOP FOLLOW-UP VISIT: CPT | Mod: S$GLB,,, | Performed by: PHYSICIAN ASSISTANT

## 2024-07-10 PROCEDURE — 73560 X-RAY EXAM OF KNEE 1 OR 2: CPT | Mod: 26,RT,, | Performed by: RADIOLOGY

## 2024-07-10 PROCEDURE — 99999 PR PBB SHADOW E&M-EST. PATIENT-LVL IV: CPT | Mod: PBBFAC,,, | Performed by: PHYSICIAN ASSISTANT

## 2024-07-10 PROCEDURE — 1159F MED LIST DOCD IN RCRD: CPT | Mod: CPTII,S$GLB,, | Performed by: PHYSICIAN ASSISTANT

## 2024-07-10 PROCEDURE — 1160F RVW MEDS BY RX/DR IN RCRD: CPT | Mod: CPTII,S$GLB,, | Performed by: PHYSICIAN ASSISTANT

## 2024-07-10 PROCEDURE — 3075F SYST BP GE 130 - 139MM HG: CPT | Mod: CPTII,S$GLB,, | Performed by: PHYSICIAN ASSISTANT

## 2024-07-10 NOTE — PROGRESS NOTES
Subjective:          Chief Complaint: Ab Girard is a 29 y.o. male who had concerns including Post-op Evaluation of the Right Knee.    Ab Girard comes to clinic for unscheduled 2 week postoperative visit of the below procedures. His physical therapist Patrice Ayala came to clinic to report patient had increase in redness around incision. Patient has been compliant with all restrictions and is doing well. Taking medications as prescribed     DATE OF PROCEDURE: 6/25/2024     ATTENDING SURGEON: Surgeons and Role:     * Caroline Quintero MD - Primary     Assistants:  MD Alexa Tamez SMA     It was medically necessary for Akash Soto MD to perform first assistant duties aiding in setup, exposure and closure.     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 Right  Osteochondral allograft knee, open 07468  2.  Right  Anterior tibial tubercleplasty 93346  3.  Right  89039 Lateral retinacular release, open  4. Right  86608 Arthroscopy, knee, for removal of loose body or foreign body  5. Right  16689 Arthroscopy, debridement/shaving of articular cartilage (Chondroplasty)  6. Right  56626 Arthroscopy, with lysis of adhesions          Findings:      ARTICULAR CARTILAGE LESION(S):  Medial Femoral Condyle: ICRS Grade 4A                 Size:  3 x 6 cm  Medial tibial plateau: ICRS Grade 2                 Size: 2.5 x 2.5 cm        Lateral Femoral Condyle: ICRS Grade 0                 Size: none  Lateral tibial plateau: ICRS Grade 2                 Size: 2.0 x 2.0 cm       Patellar surface: ICRS Grade 4A                 Size: 3.0 x 3.0 cm  Trochlear groove: ICRS Grade 4A                 Size: 3.5 x  3.5 cm          Review of Systems   Constitutional: Negative for fever and night sweats.   HENT:  Negative for hearing loss.    Eyes:  Negative for blurred vision and visual disturbance.   Cardiovascular:  Negative for chest pain and leg swelling.   Respiratory:  Negative for shortness of breath.    Endocrine: Negative for polyuria.   Hematologic/Lymphatic: Negative for bleeding problem.   Skin:  Negative for rash.   Musculoskeletal:  Negative for back pain, joint pain, joint swelling, muscle cramps and muscle weakness.   Gastrointestinal:  Negative for melena.   Genitourinary:  Negative for hematuria.   Neurological:  Negative for loss of balance, numbness and paresthesias.   Psychiatric/Behavioral:  Negative for altered mental status.        Pain Related Questions  Over the past 3 days, what was your average pain during activity? (I.e. running, jogging, walking, climbing stairs, getting dressed, ect.): 6  Over the past 3 days, what was your highest pain level?: 6  Over the past 3 days, what was your lowest pain level? : 4    Other  How many nights a week are you awakened by your affected body part?: 0  Was the patient's HEIGHT measured or patient reported?: Patient Reported  Was the patient's WEIGHT measured or patient reported?: Measured      Objective:        General: Ab is well-developed, well-nourished, appears stated age, in no acute distress, alert and oriented to time, place and person.     General    Vitals reviewed.  Constitutional: He is oriented to person, place, and time. He appears well-developed and well-nourished. No distress.   HENT:   Mouth/Throat: No oropharyngeal exudate.   Eyes: Right eye exhibits no discharge. Left eye exhibits no discharge.   Pulmonary/Chest: Effort normal and breath sounds normal. No respiratory distress.   Neurological: He is alert and oriented to person, place, and time. He has normal reflexes. No cranial nerve deficit. Coordination normal.   Psychiatric: He has a  normal mood and affect. His behavior is normal. Judgment and thought content normal.     General Musculoskeletal Exam   Gait: abnormal       Right Knee Exam     Inspection   Erythema: absent  Scars: present  Swelling: present  Effusion: absent  Deformity: absent  Bruising: present    Range of Motion   Extension:  0   Flexion:  30     Tests   Meniscus   Royal:  Medial - negative Lateral - negative  Ligament Examination   Lachman: normal (-1 to 2mm)   PCL-Posterior Drawer: normal (0 to 2mm)     MCL - Valgus: normal (0 to 2mm)  LCL - Varus: normal  Pivot Shift: normal (Equal)  Reverse Pivot Shift: normal (Equal)  Dial Test at 30 degrees: normal (< 5 degrees)  Dial Test at 90 degrees: normal (< 5 degrees)  Posterior Sag Test: negative  Posterolateral Corner: stable  Patella   Patellar apprehension: negative  Passive Patellar Tilt: neutral  Patellar Tracking: normal  Patellar Glide (quadrants): Lateral - 1   Medial - 2  Q-Angle at 90 degrees: normal  Patellar Grind: negative  J-Sign: none    Other   Meniscal Cyst: absent  Popliteal (Baker's) Cyst: absent  Sensation: normal    Comments:  Incision clean, dry, no drainage, erythema  Sutures intact  No sign of infection  Mild swelling  Compartments soft  Neurovascular status intact in extremity        Left Knee Exam   Left knee exam is normal.    Inspection   Erythema: absent  Effusion: absent  Deformity: absent    Tenderness   The patient is experiencing no tenderness.     Range of Motion   Extension:  0   Flexion:  140     Tests   Meniscus   Royal:  Medial - negative Lateral - negative  Stability   Lachman: normal (-1 to 2mm)   PCL-Posterior Drawer: normal (0 to 2mm)  MCL - Valgus: normal (0 to 2mm)  LCL - Varus: normal (0 to 2mm)  Pivot Shift: normal (Equal)  Reverse Pivot Shift: normal (Equal)  Dial Test at 30 degrees: normal (< 5 degrees)  Dial Test at 90 degrees: normal (< 5 degrees)  Posterior Sag Test: negative  Posterolateral Corner: stable  Patella   Patellar  apprehension: negative  Passive Patellar Tilt: neutral  Patellar Tracking: normal  Patellar Glide (Quadrants): Lateral - 1 Medial - 2  Q-Angle at 90 degrees: normal  Patellar Grind: negative  J-Sign: J sign absent    Other   Meniscal Cyst: absent  Popliteal (Baker's) Cyst: absent  Sensation: normal    Right Hip Exam     Tests   Maynor: negative  Left Hip Exam     Tests   Maynor: negative          Reflexes     Left Side  Achilles:  2+  Quadriceps:  2+    Right Side   Achilles:  2+  Quadriceps:  2+    Vascular Exam     Right Pulses  Dorsalis Pedis:      2+  Posterior Tibial:      2+        Left Pulses  Dorsalis Pedis:      2+  Posterior Tibial:      2+          Radiographic findings today:    Postop osteotomy anterior femoral condyles, posterior patellar articular surface later with fixation screws and anterior tibial tubercle with 2 fixation screws.  New juxta-articular operative site, Hoffa fat pad edema, suspect moderate-sized right supra patellar joint effusion.     Xrays of the right knee were ordered and reviewed by me today. These findings were discussed and reviewed with the patient.        Assessment:       Encounter Diagnoses   Name Primary?    Right knee pain, unspecified chronicity Yes    Surgery follow-up examination           Plan:       1. RTC in 4 weeks with Dr. Caroline Quintero. IKDC, SF-12 and KOOS was not filled out today in clinic. Patient will not fill out IKDC, SF-12 and KOOS on return.    2. Medications: Refills of the following Rx were sent to patients preferred Pharmacy:  Bactrim 800mg     3. Physical Therapy: Continue/Begin: Continue at [place] with Patrice   The patient should begin physical therapy on postoperative   day # 3 and will be advanced to outpatient therapy as soon as   Possible following discharge.  Weight bearing:toe touch weight bearing to 25% PWB  right leg  Range of Motion:limited to -10 degrees extension and 50 degrees flexion     Brace ROM:  Week 1:    -10-20 degrees  Week 2-3:  -10-45/60 degrees  Week 3-4: -10-90 degrees  Week 4-5: - degrees  Week 5-6: - degrees     Weightbearing: All WB immobilizer locked in extension with gait for 6 weeks  Week 1-2: Toe touch to 20% weightbearing  Week 2-3: 25-50% PWB   Week 4-5 : 50% to full weightbearing  Week 6 - transition out of immobilizer    4. HEP: N/A    5. Procedures/Procedural Planning:   N/A    6. DME:  Tscope brace adjusted to -10 to 60     7. Work/Sport Status: not working at this time    8. Visit Summary: No signs of infection. He is overall doing well. Will see back in 4 week                           [facility] patient questionnaires have been collected today.

## 2024-07-11 ENCOUNTER — CLINICAL SUPPORT (OUTPATIENT)
Dept: REHABILITATION | Facility: HOSPITAL | Age: 29
End: 2024-07-11
Payer: COMMERCIAL

## 2024-07-11 DIAGNOSIS — M25.561 ACUTE PAIN OF RIGHT KNEE: Primary | ICD-10-CM

## 2024-07-11 PROCEDURE — 97112 NEUROMUSCULAR REEDUCATION: CPT | Performed by: PHYSICAL THERAPIST

## 2024-07-11 PROCEDURE — 97140 MANUAL THERAPY 1/> REGIONS: CPT | Performed by: PHYSICAL THERAPIST

## 2024-07-11 PROCEDURE — 97110 THERAPEUTIC EXERCISES: CPT | Performed by: PHYSICAL THERAPIST

## 2024-07-15 ENCOUNTER — CLINICAL SUPPORT (OUTPATIENT)
Dept: REHABILITATION | Facility: HOSPITAL | Age: 29
End: 2024-07-15
Payer: COMMERCIAL

## 2024-07-15 DIAGNOSIS — M25.561 ACUTE PAIN OF RIGHT KNEE: Primary | ICD-10-CM

## 2024-07-15 PROCEDURE — 97112 NEUROMUSCULAR REEDUCATION: CPT | Performed by: PHYSICAL THERAPIST

## 2024-07-15 PROCEDURE — 97110 THERAPEUTIC EXERCISES: CPT | Performed by: PHYSICAL THERAPIST

## 2024-07-15 PROCEDURE — 97140 MANUAL THERAPY 1/> REGIONS: CPT | Performed by: PHYSICAL THERAPIST

## 2024-07-15 NOTE — PROGRESS NOTES
[facility]   Physical Therapy Treatment Note      Name: Ab Girard  [facility] Number: [id number]    Therapy Diagnosis:   Encounter Diagnosis   Name Primary?    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 Right Osteochondral allograft knee, open 42365  2.  Right Anterior tibial tubercleplasty 60070  3.  Right 88606 Lateral retinacular release, open  4. Right 16693 Arthroscopy, knee, for removal of loose body or foreign body  5. Right 77895 Arthroscopy, debridement/shaving of articular cartilage (Chondroplasty)  6. Right 74024 Arthroscopy, with lysis of adhesions     Precautions: Standard      Time In: 1000  Time Out: 1100  Total Appointment Time (timed & untimed codes): 60 minutes     PHYSICAL THERAPY:  The patient should begin physical therapy on postoperative   day # 3 and will be advanced to outpatient therapy as soon as   Possible following discharge.  Weight bearing:toe touch weight bearing to 25% PWB  right leg  Range of Motion:limited to -10 degrees extension and 50 degrees flexion     Brace ROM:  Week 1:    -10-20 degrees  Week 2-3: -10-45/60 degrees  Week 3-4: -10-90 degrees  Week 4-5: - degrees  Week 5-6: - degrees     Weightbearing: All WB immobilizer locked in extension with gait for 6 weeks  Week 1-2: Toe touch to 20% weightbearing  Week 2-3: 25-50% PWB   Week 4-5 : 50% to full weightbearing  Week 6 - transition out of immobilizer     Exercycle and elliptical initiated at 6-8  "weeks  CORE program at 6-8 weeks     No open chain rehabilitation for 3 months  Limited open chain rehab. 3-4 months     Running and cutting exercises at 4-6 months based on single leg balance ability  Full return planned at 9 months  (Verify with [facility] rehab. Protocol)     Time In: 100  Time Out: 200  Total Billable Time: 60 minutes    Subjective     Pt reports: he had f/u with Gunnar Preciado, went well. Pt tolerated last visit well feels improved quad control. Good compliance with WB precautions / t-scope brace  He was compliant with home exercise program.  Response to previous treatment: /Functional change: tolerated eval well    Pain: 3/10  Location: right knee      Objective      POD: 2 wks 2 days    Observation: Pt presents TTWB on R LE w/ bilat axillary crutches t-scope locked in ext. Incision healing well no drainage.. No signs/ symptoms of infection or DVT     Range of Motion:   Knee Right Left   Active 0-3-NT 0-3-148   Passive 0-0-60* 0-3-148      *Measured off EOT    Treatment     Ab received the treatments listed below:      therapeutic exercises to develop strength, endurance, ROM, flexibility, posture, and core stabilization for 14 minutes including:     LLLD into extension w/ heel prop 5 min  HS strap stretch off EOT 3x30 sec  Gastroc strap stretch 3x30 sec  Heel slides 5 min   Education on post op precautions, gait mechanics, POC, prognosis, return to activity      manual therapy techniques: Joint mobilizations were applied to the: R knee for 14 minutes, including:     Patellar mobs  Scar mob   Knee flexion off EOT  Knee ext hinge stretch  T-scope adjustment    neuromuscular re-education activities to improve: Balance, Coordination, Kinesthetic, Sense, Proprioception, and Posture for 32 minutes. The following activities were included:    biofeedback 300-500mV threshold 10"/10" duty cycle   - quad set on heel prop 5 min   - quad set on towel roll 5 min   - Supine SLR 3x10   - SL hip abduction 5 " min   - quad set on heel prop 5 min    therapeutic activities to improve functional performance for 0  minutes, including:      Patient Education and Home Exercises       Education provided:   - post op precautions, gait mechanics, POC, prognosis, return to activity     Written Home Exercises Provided: yes. Exercises were reviewed and Ab was able to demonstrate them prior to the end of the session.  Ab demonstrated good  understanding of the education provided. See EMR under Patient Instructions for exercises provided during therapy sessions    Assessment     Pt presents w/ good carryover of knee ROM. Pt continues to benefit from biofeedback to dec compensation with quad set / SLR. Pt tolerates all quad exercises well. Will continue to progress strength/ ROM as tolerated.    Ab Is progressing well towards his goals.   Pt prognosis is Good.     Pt will continue to benefit from skilled outpatient physical therapy to address the deficits listed in the problem list box on initial evaluation, provide pt/family education and to maximize pt's level of independence in the home and community environment.     Pt's spiritual, cultural and educational needs considered and pt agreeable to plan of care and goals.     Anticipated barriers to physical therapy: transportation    Goals:     Short Term Goals: 3 weeks      Pt will be independent with HEP and report compliance at least 6 days/week  Pt will demonstrate full passive knee extension and knee flexion to at least 90 deg  Pt will be able to perform 10 SLR without extension lag to demonstrate improved quad strength for gait     Long term goals: within 12 weeks     Pt will be able to amb 30 min on level surface without gait deviations  Pt will be able to perform sit<>stand to standard chair x10 with equal weight shift  Pt will be able to perform all ADL's/ job responsibilities at [employer] without pain   Pt will demonstrate full symmetrical knee ROM reporting no pain at end  range     Long Term Goals: 9 months  Pt will demonstrate no less than 90% quad strength symmetry assessed isometrically  Pt will be able to perform all weight lifting exercises at PLOF reporting no pain  Pt will be able pass vail sports cord testing to demonstrate sufficient strength/ endurance for return to full activity    Plan     Continue progressing ROM/ WB as tolerated per protocol    Patrice Ayala, PT

## 2024-07-15 NOTE — PROGRESS NOTES
[facility]   Physical Therapy Treatment Note      Name: Ab Girard  [facility] Number: [id number]    Therapy Diagnosis:   Encounter Diagnosis   Name Primary?    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 Right Osteochondral allograft knee, open 59360  2.  Right Anterior tibial tubercleplasty 78985  3.  Right 30239 Lateral retinacular release, open  4. Right 97793 Arthroscopy, knee, for removal of loose body or foreign body  5. Right 79790 Arthroscopy, debridement/shaving of articular cartilage (Chondroplasty)  6. Right 25053 Arthroscopy, with lysis of adhesions     Precautions: Standard      Time In: 1000  Time Out: 1100  Total Appointment Time (timed & untimed codes): 60 minutes     PHYSICAL THERAPY:  The patient should begin physical therapy on postoperative   day # 3 and will be advanced to outpatient therapy as soon as   Possible following discharge.  Weight bearing:toe touch weight bearing to 25% PWB  right leg  Range of Motion:limited to -10 degrees extension and 50 degrees flexion     Brace ROM:  Week 1:    -10-20 degrees  Week 2-3: -10-45/60 degrees  Week 3-4: -10-90 degrees  Week 4-5: - degrees  Week 5-6: - degrees     Weightbearing: All WB immobilizer locked in extension with gait for 6 weeks  Week 1-2: Toe touch to 20% weightbearing  Week 2-3: 25-50% PWB   Week 4-5 : 50% to full weightbearing  Week 6 - transition out of immobilizer     Exercycle and elliptical initiated at 6-8  "weeks  CORE program at 6-8 weeks     No open chain rehabilitation for 3 months  Limited open chain rehab. 3-4 months     Running and cutting exercises at 4-6 months based on single leg balance ability  Full return planned at 9 months  (Verify with [facility] rehab. Protocol)     Time In: 1000  Time Out: 1200  Total Billable Time: 60 minutes    Subjective     Pt reports: he had f/u with Gunnar Preciado, went well. Pt tolerated last visit well feels improved quad control. Good compliance with WB precautions / t-scope brace  He was compliant with home exercise program.  Response to previous treatment: /Functional change: tolerated eval well    Pain: 3/10  Location: right knee      Objective      POD: 2 wks 6 days    Observation: Pt presents TTWB on R LE w/ bilat axillary crutches t-scope locked in ext. Incision healing well no drainage.. No signs/ symptoms of infection or DVT     Range of Motion:   Knee Right Left   Active 0-3-NT 0-3-148   Passive 0-0-80* 0-3-148      *Measured off EOT    Treatment     Ab received the treatments listed below:      therapeutic exercises to develop strength, endurance, ROM, flexibility, posture, and core stabilization for 14 minutes including:     LLLD into extension w/ heel prop 5 min  HS strap stretch off EOT 3x30 sec  Gastroc strap stretch 3x30 sec  Heel slides within protocol limits 5 min   Education on post op precautions, gait mechanics, POC, prognosis, return to activity      manual therapy techniques: Joint mobilizations were applied to the: R knee for 14 minutes, including:     Patellar mobs  Scar mob   Knee flexion off EOT  Knee ext hinge stretch  T-scope adjustment    neuromuscular re-education activities to improve: Balance, Coordination, Kinesthetic, Sense, Proprioception, and Posture for 32 minutes. The following activities were included:    biofeedback 300-500mV threshold 10"/10" duty cycle   - quad set on heel prop 5 min   - quad set on towel roll 5 min   - Supine SLR " 3x10   - Standing weight shifts up to 60# w/ t-scope locked    - quad set on heel prop 5 min    therapeutic activities to improve functional performance for 0  minutes, including:      Patient Education and Home Exercises       Education provided:   - post op precautions, gait mechanics, POC, prognosis, return to activity     Written Home Exercises Provided: yes. Exercises were reviewed and Ab was able to demonstrate them prior to the end of the session.  Ab demonstrated good  understanding of the education provided. See EMR under Patient Instructions for exercises provided during therapy sessions    Assessment     Pt presents lacking min extension, improves to full w/ manual/ LLLD. Pt continues to be challenged by biofeedback exercises. Introduced weight shifts with focus on quad control. Pt tolerates all exercise welll. Will continue to progress ROM per protocol    Ab Is progressing well towards his goals.   Pt prognosis is Good.     Pt will continue to benefit from skilled outpatient physical therapy to address the deficits listed in the problem list box on initial evaluation, provide pt/family education and to maximize pt's level of independence in the home and community environment.     Pt's spiritual, cultural and educational needs considered and pt agreeable to plan of care and goals.     Anticipated barriers to physical therapy: transportation    Goals:     Short Term Goals: 3 weeks      Pt will be independent with HEP and report compliance at least 6 days/week  Pt will demonstrate full passive knee extension and knee flexion to at least 90 deg  Pt will be able to perform 10 SLR without extension lag to demonstrate improved quad strength for gait     Long term goals: within 12 weeks     Pt will be able to amb 30 min on level surface without gait deviations  Pt will be able to perform sit<>stand to standard chair x10 with equal weight shift  Pt will be able to perform all ADL's/ job responsibilities  at PLOF without pain   Pt will demonstrate full symmetrical knee ROM reporting no pain at end range     Long Term Goals: 9 months  Pt will demonstrate no less than 90% quad strength symmetry assessed isometrically  Pt will be able to perform all weight lifting exercises at PLOF reporting no pain  Pt will be able pass vail sports cord testing to demonstrate sufficient strength/ endurance for return to full activity    Plan     Continue progressing ROM/ WB as tolerated per protocol    Patrice Ayala, PT

## 2024-07-18 ENCOUNTER — CLINICAL SUPPORT (OUTPATIENT)
Dept: REHABILITATION | Facility: HOSPITAL | Age: 29
End: 2024-07-18
Payer: COMMERCIAL

## 2024-07-18 DIAGNOSIS — M25.561 ACUTE PAIN OF RIGHT KNEE: Primary | ICD-10-CM

## 2024-07-18 PROCEDURE — 97110 THERAPEUTIC EXERCISES: CPT | Performed by: PHYSICAL THERAPIST

## 2024-07-18 PROCEDURE — 97530 THERAPEUTIC ACTIVITIES: CPT | Performed by: PHYSICAL THERAPIST

## 2024-07-18 PROCEDURE — 97112 NEUROMUSCULAR REEDUCATION: CPT | Performed by: PHYSICAL THERAPIST

## 2024-07-18 NOTE — PROGRESS NOTES
[facility]   Physical Therapy Treatment Note      Name: Ab Girard  [facility] Number: [id number]    Therapy Diagnosis:   No diagnosis found.    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 Right Osteochondral allograft knee, open 18736  2.  Right Anterior tibial tubercleplasty 77043  3.  Right 96590 Lateral retinacular release, open  4. Right 66187 Arthroscopy, knee, for removal of loose body or foreign body  5. Right 87655 Arthroscopy, debridement/shaving of articular cartilage (Chondroplasty)  6. Right 02237 Arthroscopy, with lysis of adhesions     Precautions: Standard      Time In: 1000  Time Out: 1100  Total Appointment Time (timed & untimed codes): 60 minutes     PHYSICAL THERAPY:  The patient should begin physical therapy on postoperative   day # 3 and will be advanced to outpatient therapy as soon as   Possible following discharge.  Weight bearing:toe touch weight bearing to 25% PWB  right leg  Range of Motion:limited to -10 degrees extension and 50 degrees flexion     Brace ROM:  Week 1:    -10-20 degrees  Week 2-3: -10-45/60 degrees  Week 3-4: -10-90 degrees  Week 4-5: - degrees  Week 5-6: - degrees     Weightbearing: All WB immobilizer locked in extension with gait for 6 weeks  Week 1-2: Toe touch to 20% weightbearing  Week 2-3: 25-50% PWB   Week 4-5 : 50% to full weightbearing  Week 6 - transition out of immobilizer     Exercycle and elliptical initiated at 6-8 weeks  CORE program at 6-8 weeks     No open chain  "rehabilitation for 3 months  Limited open chain rehab. 3-4 months     Running and cutting exercises at 4-6 months based on single leg balance ability  Full return planned at 9 months  (Verify with [facility] rehab. Protocol)     Time In: 1000  Time Out: 1200  Total Billable Time: 60 minutes    Subjective     Pt reports: knee feeling better since last visit. Pt reports quad set/ SLR getting easier. Compliant with WB precautions/ t-scope  He was compliant with home exercise program.  Response to previous treatment: /Functional change: tolerated eval well    Pain: 3/10  Location: right knee      Objective      POD: 3 wks 2 days    Observation: Pt presents TTWB on R LE w/ bilat axillary crutches t-scope locked in ext. Incision healing well no drainage.. No signs/ symptoms of infection or DVT     Range of Motion:   Knee Right Left   Active 0-0-NT 0-3-148   Passive 0-0-90* 0-3-148      *Measured off EOT    Treatment     Ab received the treatments listed below:      therapeutic exercises to develop strength, endurance, ROM, flexibility, posture, and core stabilization for 10 minutes including:     LLLD into extension w/ heel prop 5 min  Heel slides within protocol limits 5 min   Education on post op precautions, gait mechanics, POC, prognosis, return to activity     NP today  HS strap stretch off EOT 3x30 sec  Gastroc strap stretch 3x30 sec     manual therapy techniques: Joint mobilizations were applied to the: R knee for 18 minutes, including:     Patellar mobs  Scar mob   Knee flexion off EOT  Knee ext hinge stretch  Modified tracee test stretch off EOT  T-scope adjustment    neuromuscular re-education activities to improve: Balance, Coordination, Kinesthetic, Sense, Proprioception, and Posture for 32 minutes. The following activities were included:    biofeedback 700mV threshold 10"/10" duty cycle   - quad set on heel prop 5 min   - Supine SLR 5 min   - Standing weight shifts up to 100# w/ t-scope locked    - quad set " on heel prop 5 min    T-scope locked in ext  SLR 2# 3x15  SL hip abduction 2# 3x15    therapeutic activities to improve functional performance for 0  minutes, including:      Patient Education and Home Exercises       Education provided:   - post op precautions, gait mechanics, POC, prognosis, return to activity     Written Home Exercises Provided: yes. Exercises were reviewed and Ab was able to demonstrate them prior to the end of the session.  Ab demonstrated good  understanding of the education provided. See EMR under Patient Instructions for exercises provided during therapy sessions    Assessment     Pt presents w/ improved carryover of extension, good quad control upon entry today. Pt tolerates progressed WB activities well. Pt achieves quad stretch in tracee test position. Will continue to progress quad strength/ ROM as tolerated.    Ab Is progressing well towards his goals.   Pt prognosis is Good.     Pt will continue to benefit from skilled outpatient physical therapy to address the deficits listed in the problem list box on initial evaluation, provide pt/family education and to maximize pt's level of independence in the home and community environment.     Pt's spiritual, cultural and educational needs considered and pt agreeable to plan of care and goals.     Anticipated barriers to physical therapy: transportation    Goals:     Short Term Goals: 3 weeks      Pt will be independent with HEP and report compliance at least 6 days/week  Pt will demonstrate full passive knee extension and knee flexion to at least 90 deg  Pt will be able to perform 10 SLR without extension lag to demonstrate improved quad strength for gait     Long term goals: within 12 weeks     Pt will be able to amb 30 min on level surface without gait deviations  Pt will be able to perform sit<>stand to standard chair x10 with equal weight shift  Pt will be able to perform all ADL's/ job responsibilities at [employer] [facility] [employer] without pain   Pt  will demonstrate full symmetrical knee ROM reporting no pain at end range     Long Term Goals: 9 months  Pt will demonstrate no less than 90% quad strength symmetry assessed isometrically  Pt will be able to perform all weight lifting exercises at [facility] reporting no pain  Pt will be able pass vail sports cord testing to demonstrate sufficient strength/ endurance for return to full activity    Plan     Continue progressing ROM/ WB as tolerated per protocol    Patrice Ayala, PT

## 2024-07-22 ENCOUNTER — CLINICAL SUPPORT (OUTPATIENT)
Dept: REHABILITATION | Facility: HOSPITAL | Age: 29
End: 2024-07-22
Payer: COMMERCIAL

## 2024-07-22 DIAGNOSIS — M25.561 ACUTE PAIN OF RIGHT KNEE: Primary | ICD-10-CM

## 2024-07-22 PROCEDURE — 97110 THERAPEUTIC EXERCISES: CPT | Performed by: PHYSICAL THERAPIST

## 2024-07-22 PROCEDURE — 97140 MANUAL THERAPY 1/> REGIONS: CPT | Performed by: PHYSICAL THERAPIST

## 2024-07-22 PROCEDURE — 97530 THERAPEUTIC ACTIVITIES: CPT | Performed by: PHYSICAL THERAPIST

## 2024-07-22 PROCEDURE — 97112 NEUROMUSCULAR REEDUCATION: CPT | Performed by: PHYSICAL THERAPIST

## 2024-07-22 NOTE — PROGRESS NOTES
[facility]   Physical Therapy Treatment Note      Name: Ab Girard  [facility] Number: [id number]    Therapy Diagnosis:   Encounter Diagnosis   Name Primary?    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 Right Osteochondral allograft knee, open 41170  2.  Right Anterior tibial tubercleplasty 61819  3.  Right 31703 Lateral retinacular release, open  4. Right 53738 Arthroscopy, knee, for removal of loose body or foreign body  5. Right 65997 Arthroscopy, debridement/shaving of articular cartilage (Chondroplasty)  6. Right 61222 Arthroscopy, with lysis of adhesions     Precautions: Standard      Time In: 1000  Time Out: 1100  Total Appointment Time (timed & untimed codes): 60 minutes     PHYSICAL THERAPY:  The patient should begin physical therapy on postoperative   day # 3 and will be advanced to outpatient therapy as soon as   Possible following discharge.  Weight bearing:toe touch weight bearing to 25% PWB  right leg  Range of Motion:limited to -10 degrees extension and 50 degrees flexion     Brace ROM:  Week 1:    -10-20 degrees  Week 2-3: -10-45/60 degrees  Week 3-4: -10-90 degrees  Week 4-5: - degrees  Week 5-6: - degrees     Weightbearing: All WB immobilizer locked in extension with gait for 6 weeks  Week 1-2: Toe touch to 20% weightbearing  Week 2-3: 25-50% PWB   Week 4-5 : 50% to full weightbearing  Week 6 - transition out of immobilizer     Exercycle and elliptical initiated at 6-8  "weeks  CORE program at 6-8 weeks     No open chain rehabilitation for 3 months  Limited open chain rehab. 3-4 months     Running and cutting exercises at 4-6 months based on single leg balance ability  Full return planned at 9 months  (Verify with [facility] rehab. Protocol)     Time In: 1000  Time Out: 1200  Total Billable Time: 60 minutes    Subjective     Pt reports: min soreness over the weekend. Tolerated last visit well. HEP going well. Good compliance with t-scope/ WB precautions   He was compliant with home exercise program.  Response to previous treatment: /Functional change: tolerated eval well    Pain: 3/10  Location: right knee      Objective      POD: 3 wks 6 days    Observation: Pt presents PWB on R LE w/ bilat axillary crutches t-scope locked in ext. Incision healing well no drainage.. No signs/ symptoms of infection or DVT     Range of Motion:   Knee Right Left   Active 0-0-NT 0-3-148   Passive 0-0-95* 0-3-148      *Measured off EOT    Quad set: fair  SLR: min lag    Treatment     Ab received the treatments listed below:      therapeutic exercises to develop strength, endurance, ROM, flexibility, posture, and core stabilization for 10 minutes including:     LLLD into extension w/ heel prop 5 min  Heel slides within protocol limits 5 min   Education on post op precautions, gait mechanics, POC, prognosis, return to activity     NP today  HS strap stretch off EOT 3x30 sec  Gastroc strap stretch 3x30 sec     manual therapy techniques: Joint mobilizations were applied to the: R knee for 15 minutes, including:     Patellar mobs  Scar mob   Knee flexion off EOT  Knee ext hinge stretch  Modified tracee test stretch off EOT  T-scope adjustment    neuromuscular re-education activities to improve: Balance, Coordination, Kinesthetic, Sense, Proprioception, and Posture for 25 minutes. The following activities were included:    biofeedback 700mV threshold 10"/10" duty cycle   - quad set on heel prop 5 min   - " Supine SLR 5 min   - Standing weight shifts up to 100# w/ t-scope locked      NP today  T-scope locked in ext  SLR 2# 3x15  SL hip abduction 2# 3x15    therapeutic activities to improve functional performance for 10  minutes, including:    Upright bike for 10 min for cartilage health/ LE endurance training    Patient Education and Home Exercises       Education provided:   - post op precautions, gait mechanics, POC, prognosis, return to activity     Written Home Exercises Provided: yes. Exercises were reviewed and Ab was able to demonstrate them prior to the end of the session.  Ab demonstrated good  understanding of the education provided. See EMR under Patient Instructions for exercises provided during therapy sessions    Assessment     Pt presents w/ good carryover of ROM. Pt tolerates full rotations on the bike well today. Pt continues to benefit from biofeedback to improve quad tone. Pt educated on progression back to gym. Will continue to progress ROM/ WB as tolerated per protocol    Ab Is progressing well towards his goals.   Pt prognosis is Good.     Pt will continue to benefit from skilled outpatient physical therapy to address the deficits listed in the problem list box on initial evaluation, provide pt/family education and to maximize pt's level of independence in the home and community environment.     Pt's spiritual, cultural and educational needs considered and pt agreeable to plan of care and goals.     Anticipated barriers to physical therapy: transportation    Goals:     Short Term Goals: 3 weeks      Pt will be independent with HEP and report compliance at least 6 days/week  Pt will demonstrate full passive knee extension and knee flexion to at least 90 deg  Pt will be able to perform 10 SLR without extension lag to demonstrate improved quad strength for gait     Long term goals: within 12 weeks     Pt will be able to amb 30 min on level surface without gait deviations  Pt will be able to  perform sit<>stand to standard chair x10 with equal weight shift  Pt will be able to perform all ADL's/ job responsibilities at [employer] without pain   Pt will demonstrate full symmetrical knee ROM reporting no pain at end range     Long Term Goals: 9 months  Pt will demonstrate no less than 90% quad strength symmetry assessed isometrically  Pt will be able to perform all weight lifting exercises at [facility] reporting no pain  Pt will be able pass vail sports cord testing to demonstrate sufficient strength/ endurance for return to full activity    Plan     Continue progressing ROM/ WB as tolerated per protocol    Patrice Ayala, PT

## 2024-07-23 ENCOUNTER — TELEPHONE (OUTPATIENT)
Dept: SPORTS MEDICINE | Facility: CLINIC | Age: 29
End: 2024-07-23
Payer: COMMERCIAL

## 2024-07-23 NOTE — TELEPHONE ENCOUNTER
----- Message from Alexa Story sent at 7/22/2024  4:18 PM CDT -----  Give him number to central pricing office  ----- Message -----  From: Nichole Mills  Sent: 7/22/2024   1:48 PM CDT  To: [facility] Staff         Nature of Call: claims issues for past surgery    Best Call Back Number: [phone]     Additional Information:  JESSIE DUHON calling regarding Patient Advice for wanting to speak to the staff about having questions that the insurance is now wanting to decline a portion of the surgery claim stating it was not a medical necessity and is wanting to charge the PT for the remainder due to this issue, please call back to inform the PT as to how he can be assisted,

## 2024-07-23 NOTE — TELEPHONE ENCOUNTER
Patient called in regarding his insurance denying a portion of his surgery. Gave patient the central pricing number.

## 2024-07-25 ENCOUNTER — CLINICAL SUPPORT (OUTPATIENT)
Dept: REHABILITATION | Facility: HOSPITAL | Age: 29
End: 2024-07-25
Payer: COMMERCIAL

## 2024-07-25 DIAGNOSIS — M25.561 ACUTE PAIN OF RIGHT KNEE: Primary | ICD-10-CM

## 2024-07-25 PROCEDURE — 97530 THERAPEUTIC ACTIVITIES: CPT | Performed by: PHYSICAL THERAPIST

## 2024-07-25 PROCEDURE — 97110 THERAPEUTIC EXERCISES: CPT | Performed by: PHYSICAL THERAPIST

## 2024-07-25 PROCEDURE — 97112 NEUROMUSCULAR REEDUCATION: CPT | Performed by: PHYSICAL THERAPIST

## 2024-07-25 NOTE — PROGRESS NOTES
[facility]   Physical Therapy Treatment Note      Name: Ab Girard  [facility] Number: [id number]    Therapy Diagnosis:   Encounter Diagnosis   Name Primary?    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 Right Osteochondral allograft knee, open 42193  2.  Right Anterior tibial tubercleplasty 41465  3.  Right 39890 Lateral retinacular release, open  4. Right 98518 Arthroscopy, knee, for removal of loose body or foreign body  5. Right 79180 Arthroscopy, debridement/shaving of articular cartilage (Chondroplasty)  6. Right 01078 Arthroscopy, with lysis of adhesions     Precautions: Standard      Time In: 1000  Time Out: 1100  Total Appointment Time (timed & untimed codes): 60 minutes     PHYSICAL THERAPY:  The patient should begin physical therapy on postoperative   day # 3 and will be advanced to outpatient therapy as soon as   Possible following discharge.  Weight bearing:toe touch weight bearing to 25% PWB  right leg  Range of Motion:limited to -10 degrees extension and 50 degrees flexion     Brace ROM:  Week 1:    -10-20 degrees  Week 2-3: -10-45/60 degrees  Week 3-4: -10-90 degrees  Week 4-5: - degrees  Week 5-6: - degrees     Weightbearing: All WB immobilizer locked in extension with gait for 6 weeks  Week 1-2: Toe touch to 20% weightbearing  Week 2-3: 25-50% PWB   Week 4-5 : 50% to full weightbearing  Week 6 - transition out of immobilizer     Exercycle and elliptical initiated at 6-8  "weeks  CORE program at 6-8 weeks     No open chain rehabilitation for 3 months  Limited open chain rehab. 3-4 months     Running and cutting exercises at 4-6 months based on single leg balance ability  Full return planned at 9 months  (Verify with [facility] rehab. Protocol)     Time In: 1000  Time Out: 1200  Total Billable Time: 60 minutes    Subjective     Pt reports: knee has been feeling good since last visit. Pt reports bending going well. Some soreness superior to patella but has been improving   He was compliant with home exercise program.  Response to previous treatment: /Functional change: tolerated eval well    Pain: 3/10  Location: right knee      Objective      POD: 4 wks 2 days    Observation: Pt presents PWB on R LE w/ bilat axillary crutches t-scope locked in ext. Incision healing well no drainage.. No signs/ symptoms of infection or DVT     Range of Motion:   Knee Right Left   Active 0-0-NT 0-3-148   Passive 0-0-110 0-3-148      *Measured off EOT    Quad set: fair  SLR: no lag after cuing    Treatment     Ab received the treatments listed below:      therapeutic exercises to develop strength, endurance, ROM, flexibility, posture, and core stabilization for 10 minutes including:     HS strap stretch off EOT 3x30 secHeel slides within protocol limits 5 min   Education on post op precautions, gait mechanics, POC, prognosis, return to activity     NP today    LLLD into extension w/ heel prop 5 min  Gastroc strap stretch 3x30 sec     manual therapy techniques: Joint mobilizations were applied to the: R knee for 5 minutes, including:     Patellar mobs  Scar mob   Knee flexion off EOT  Knee ext hinge stretch  Modified tracee test stretch off EOT  T-scope adjustment    neuromuscular re-education activities to improve: Balance, Coordination, Kinesthetic, Sense, Proprioception, and Posture for 35 minutes. The following activities were included:    biofeedback 700mV threshold 10"/10" duty cycle  BFR 70% LOP " "30/15/15/15   - quad set    - SL hip abd   - TKE w/ orange sport cord     NP today  T-scope locked in ext  SLR 2# 3x15  SL hip abduction 2# 3x15    therapeutic activities to improve functional performance for 10  minutes, including:    Fan bike for 8 min for cartilage health/ LE endurance training (30" 10 mph/ 30" 15 mph)  BFR 60% LOP 30/15/15/15  DL squat w/ biofeedback 0-30 deg     Patient Education and Home Exercises       Education provided:   - post op precautions, gait mechanics, POC, prognosis, return to activity     Written Home Exercises Provided: yes. Exercises were reviewed and Ab was able to demonstrate them prior to the end of the session.  Ab demonstrated good  understanding of the education provided. See EMR under Patient Instructions for exercises provided during therapy sessions    Assessment     Pt presents w/ ROM progressing per protocol, no pain at end range. Introduced BFR / CKC strengthening today w/ good tolerance. Pt continues to benefit from biofeedback to improve quad tone. Will continue to progress ROM/ quad strength as tolerated per protocol    Ab Is progressing well towards his goals.   Pt prognosis is Good.     Pt will continue to benefit from skilled outpatient physical therapy to address the deficits listed in the problem list box on initial evaluation, provide pt/family education and to maximize pt's level of independence in the home and community environment.     Pt's spiritual, cultural and educational needs considered and pt agreeable to plan of care and goals.     Anticipated barriers to physical therapy: transportation    Goals:     Short Term Goals: 3 weeks      Pt will be independent with HEP and report compliance at least 6 days/week  Pt will demonstrate full passive knee extension and knee flexion to at least 90 deg  Pt will be able to perform 10 SLR without extension lag to demonstrate improved quad strength for gait     Long term goals: within 12 weeks     Pt will " be able to amb 30 min on level surface without gait deviations  Pt will be able to perform sit<>stand to standard chair x10 with equal weight shift  Pt will be able to perform all ADL's/ job responsibilities at [facility] without pain   Pt will demonstrate full symmetrical knee ROM reporting no pain at end range     Long Term Goals: 9 months  Pt will demonstrate no less than 90% quad strength symmetry assessed isometrically  Pt will be able to perform all weight lifting exercises at [facility] reporting no pain  Pt will be able pass vail sports cord testing to demonstrate sufficient strength/ endurance for return to full activity    Plan     Continue progressing ROM/ WB as tolerated per protocol    Patrice Ayala, PT

## 2024-07-29 ENCOUNTER — PATIENT MESSAGE (OUTPATIENT)
Dept: REHABILITATION | Facility: HOSPITAL | Age: 29
End: 2024-07-29

## 2024-07-29 ENCOUNTER — CLINICAL SUPPORT (OUTPATIENT)
Dept: REHABILITATION | Facility: HOSPITAL | Age: 29
End: 2024-07-29
Payer: COMMERCIAL

## 2024-07-29 DIAGNOSIS — M25.561 ACUTE PAIN OF RIGHT KNEE: Primary | ICD-10-CM

## 2024-07-29 PROCEDURE — 97110 THERAPEUTIC EXERCISES: CPT | Performed by: PHYSICAL THERAPIST

## 2024-07-29 PROCEDURE — 97112 NEUROMUSCULAR REEDUCATION: CPT | Performed by: PHYSICAL THERAPIST

## 2024-07-29 PROCEDURE — 97530 THERAPEUTIC ACTIVITIES: CPT | Performed by: PHYSICAL THERAPIST

## 2024-07-29 NOTE — PROGRESS NOTES
[facility]   Physical Therapy Treatment Note      Name: Ab Girard  [facility] Number: [id number]    Therapy Diagnosis:   Encounter Diagnosis   Name Primary?    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 Right Osteochondral allograft knee, open 26530  2.  Right Anterior tibial tubercleplasty 06367  3.  Right 27188 Lateral retinacular release, open  4. Right 86226 Arthroscopy, knee, for removal of loose body or foreign body  5. Right 39439 Arthroscopy, debridement/shaving of articular cartilage (Chondroplasty)  6. Right 65229 Arthroscopy, with lysis of adhesions     Precautions: Standard      Time In: 1000  Time Out: 1100  Total Appointment Time (timed & untimed codes): 60 minutes     PHYSICAL THERAPY:  The patient should begin physical therapy on postoperative   day # 3 and will be advanced to outpatient therapy as soon as   Possible following discharge.  Weight bearing:toe touch weight bearing to 25% PWB  right leg  Range of Motion:limited to -10 degrees extension and 50 degrees flexion     Brace ROM:  Week 1:    -10-20 degrees  Week 2-3: -10-45/60 degrees  Week 3-4: -10-90 degrees  Week 4-5: - degrees  Week 5-6: - degrees     Weightbearing: All WB immobilizer locked in extension with gait for 6 weeks  Week 1-2: Toe touch to 20% weightbearing  Week 2-3: 25-50% PWB   Week 4-5 : 50% to full weightbearing  Week 6 - transition out of immobilizer     Exercycle and elliptical initiated at 6-8  "weeks  CORE program at 6-8 weeks     No open chain rehabilitation for 3 months  Limited open chain rehab. 3-4 months     Running and cutting exercises at 4-6 months based on single leg balance ability  Full return planned at 9 months  (Verify with [facility] rehab. Protocol)     Time In: 1000  Time Out: 1100  Total Billable Time: 60 minutes    Subjective     Pt reports: min soreness after last visit. Pt reports bend/ progressed WB going well.   He was compliant with home exercise program.  Response to previous treatment: /Functional change: tolerated eval well    Pain: 1/10  Location: right knee      Objective      POD: 4 wks 2 days    Observation: Pt presents PWB on R LE w/ bilat axillary crutches t-scope locked in ext. Incision healing well no drainage.. No signs/ symptoms of infection or DVT     Range of Motion:   Knee Right Left   Active 0-0-90 0-3-148   Passive 0-0-125 0-3-148      *Measured off EOT    Quad set: fair  SLR: no lag after cuing    Treatment     Ab received the treatments listed below:      therapeutic exercises to develop strength, endurance, ROM, flexibility, posture, and core stabilization for 19 minutes including:     LLLD into extension w/ heel prop 5 min  HS strap stretch off EOT 3x30 sec  Heel slides within protocol limits 5 min   Prone quad stretch 3x30"  Education on post op precautions, gait mechanics, POC, prognosis, return to activity     NP today      Gastroc strap stretch 3x30 sec     manual therapy techniques: Joint mobilizations were applied to the: R knee for 5 minutes, including:     Patellar mobs  Scar mob   Knee flexion off EOT  Knee ext hinge stretch  Modified tracee test stretch off EOT  T-scope adjustment    neuromuscular re-education activities to improve: Balance, Coordination, Kinesthetic, Sense, Proprioception, and Posture for 31 minutes. The following activities were included:    biofeedback 900mV threshold 10"/10" duty cycle   - quad set 5 min   - SLR 1# 5 min   - WBAT " "quad set 5 min  T-scope locked in ext  SLR 2# 3x15  SL hip abduction 2# 3x15     NP today  BFR 70% LOP 30/15/15/15      therapeutic activities to improve functional performance for 10  minutes, including:    Upright bike for 10 min for cartilage health/ LE endurance training (30" 10 mph/ 30" 15 mph)    NP today  BFR 60% LOP 30/15/15/15  DL squat w/ biofeedback 0-30 deg     Patient Education and Home Exercises       Education provided:   - post op precautions, gait mechanics, POC, prognosis, return to activity     Written Home Exercises Provided: yes. Exercises were reviewed and Ab was able to demonstrate them prior to the end of the session.  Ab demonstrated good  understanding of the education provided. See EMR under Patient Instructions for exercises provided during therapy sessions    Assessment     Pt demonstrates good carryover of flexion, extension a little stiff but improved with LLLD. Pt with good quad tone, tolerates progressed WB well today. Will continue to progress strength/ ROM as tolerated per protocol    Ab Is progressing well towards his goals.   Pt prognosis is Good.     Pt will continue to benefit from skilled outpatient physical therapy to address the deficits listed in the problem list box on initial evaluation, provide pt/family education and to maximize pt's level of independence in the home and community environment.     Pt's spiritual, cultural and educational needs considered and pt agreeable to plan of care and goals.     Anticipated barriers to physical therapy: transportation    Goals:     Short Term Goals: 3 weeks      Pt will be independent with HEP and report compliance at least 6 days/week  Pt will demonstrate full passive knee extension and knee flexion to at least 90 deg  Pt will be able to perform 10 SLR without extension lag to demonstrate improved quad strength for gait     Long term goals: within 12 weeks     Pt will be able to amb 30 min on level surface without gait " deviations  Pt will be able to perform sit<>stand to standard chair x10 with equal weight shift  Pt will be able to perform all ADL's/ job responsibilities at [employer] without pain   Pt will demonstrate full symmetrical knee ROM reporting no pain at end range     Long Term Goals: 9 months  Pt will demonstrate no less than 90% quad strength symmetry assessed isometrically  Pt will be able to perform all weight lifting exercises at [facility] reporting no pain  Pt will be able pass vail sports cord testing to demonstrate sufficient strength/ endurance for return to full activity    Plan     Continue progressing ROM/ WB as tolerated per protocol    Patrice Ayala, PT

## 2024-08-01 ENCOUNTER — CLINICAL SUPPORT (OUTPATIENT)
Dept: REHABILITATION | Facility: HOSPITAL | Age: 29
End: 2024-08-01
Payer: COMMERCIAL

## 2024-08-01 DIAGNOSIS — M25.561 ACUTE PAIN OF RIGHT KNEE: Primary | ICD-10-CM

## 2024-08-01 PROCEDURE — 97140 MANUAL THERAPY 1/> REGIONS: CPT | Performed by: PHYSICAL THERAPIST

## 2024-08-01 PROCEDURE — 97530 THERAPEUTIC ACTIVITIES: CPT | Performed by: PHYSICAL THERAPIST

## 2024-08-01 PROCEDURE — 97112 NEUROMUSCULAR REEDUCATION: CPT | Performed by: PHYSICAL THERAPIST

## 2024-08-01 NOTE — PROGRESS NOTES
[facility]   Physical Therapy Treatment Note      Name: Ab Girard  [facility] Number: [id number]    Therapy Diagnosis:   Encounter Diagnosis   Name Primary?    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 Right Osteochondral allograft knee, open 77673  2.  Right Anterior tibial tubercleplasty 39379  3.  Right 00410 Lateral retinacular release, open  4. Right 61938 Arthroscopy, knee, for removal of loose body or foreign body  5. Right 38768 Arthroscopy, debridement/shaving of articular cartilage (Chondroplasty)  6. Right 57244 Arthroscopy, with lysis of adhesions     Precautions: Standard      Time In: 1000  Time Out: 1100  Total Appointment Time (timed & untimed codes): 60 minutes     PHYSICAL THERAPY:  The patient should begin physical therapy on postoperative   day # 3 and will be advanced to outpatient therapy as soon as   Possible following discharge.  Weight bearing:toe touch weight bearing to 25% PWB  right leg  Range of Motion:limited to -10 degrees extension and 50 degrees flexion     Brace ROM:  Week 1:    -10-20 degrees  Week 2-3: -10-45/60 degrees  Week 3-4: -10-90 degrees  Week 4-5: - degrees  Week 5-6: - degrees     Weightbearing: All WB immobilizer locked in extension with gait for 6 weeks  Week 1-2: Toe touch to 20% weightbearing  Week 2-3: 25-50% PWB   Week 4-5 : 50% to full weightbearing  Week 6 - transition out of immobilizer     Exercycle and elliptical initiated at 6-8  "weeks  CORE program at 6-8 weeks     No open chain rehabilitation for 3 months  Limited open chain rehab. 3-4 months     Running and cutting exercises at 4-6 months based on single leg balance ability  Full return planned at 9 months  (Verify with [facility]. Protocol)     Time In: 1000  Time Out: 1100  Total Billable Time: 60 minutes    Subjective     Pt reports: feeling sore primarily superior to patella. Pt reports bending feeling good, tolerating progressed WB well  He was compliant with home exercise program.  Response to previous treatment: /Functional change: tolerated eval well    Pain: 1/10  Location: right knee      Objective      POD: 5 wks 2 days    Observation: Pt presents WBAT on R LE w/ single axillary crutches t-scope locked in ext. Incision healing well no drainage.. No signs/ symptoms of infection or DVT     Range of Motion:   Knee Right Left   Active 0-0-90 0-3-148   Passive 0-0-130 0-3-148      *Measured off EOT    Quad set: fair  SLR: no lag after cuing    Treatment     Ab received the treatments listed below:      therapeutic exercises to develop strength, endurance, ROM, flexibility, posture, and core stabilization for 19 minutes including:     LLLD into extension w/ heel prop 3# above/below knee 5 min  Heel slides within protocol limits 5 min   Standing quad stretch 3x30"  Education on post op precautions, gait mechanics, POC, prognosis, return to activity     NP today  HS strap stretch off EOT 3x30 sec  Gastroc strap stretch 3x30 sec     manual therapy techniques: Joint mobilizations were applied to the: R knee for 5 minutes, including:     Patellar mobs  Scar mob   Knee flexion off EOT  Knee ext hinge stretch  Modified tracee test stretch off EOT  T-scope adjustment    neuromuscular re-education activities to improve: Balance, Coordination, Kinesthetic, Sense, Proprioception, and Posture for 31 minutes. The following activities were included:    BFR 70% LOP 30/15/15/15   - SLR 0#   - " "SL hip abd 0#   - TKE w/ yellow sport cord  DL bridge 4x10     NP today  T-scope locked in ext  SLR 2# 3x15  SL hip abduction 2# 3x15  biofeedback 900mV threshold 10"/10" duty cycle    therapeutic activities to improve functional performance for 10  minutes, including:    BFR 50% LOP  - Upright bike for 10 min for cartilage health/ LE endurance training     NP today  BFR 60% LOP 30/15/15/15  DL squat w/ biofeedback 0-30 deg     Patient Education and Home Exercises       Education provided:   - post op precautions, gait mechanics, POC, prognosis, return to activity     Written Home Exercises Provided: yes. Exercises were reviewed and Ab was able to demonstrate them prior to the end of the session.  Ab demonstrated good  understanding of the education provided. See EMR under Patient Instructions for exercises provided during therapy sessions    Assessment     Pt presents progressing well per protocol. ROM steadily improving, pt tolerating standing quad stretch well. Pt with good tolerance to BFR progressions today. Will continue to progress ROM/ WB as tolerated per protocol    Ab Is progressing well towards his goals.   Pt prognosis is Good.     Pt will continue to benefit from skilled outpatient physical therapy to address the deficits listed in the problem list box on initial evaluation, provide pt/family education and to maximize pt's level of independence in the home and community environment.     Pt's spiritual, cultural and educational needs considered and pt agreeable to plan of care and goals.     Anticipated barriers to physical therapy: transportation    Goals:     Short Term Goals: 3 weeks      Pt will be independent with HEP and report compliance at least 6 days/week  Pt will demonstrate full passive knee extension and knee flexion to at least 90 deg  Pt will be able to perform 10 SLR without extension lag to demonstrate improved quad strength for gait     Long term goals: within 12 weeks     Pt " will be able to amb 30 min on level surface without gait deviations  Pt will be able to perform sit<>stand to standard chair x10 with equal weight shift  Pt will be able to perform all ADL's/ job responsibilities at [employer] without pain   Pt will demonstrate full symmetrical knee ROM reporting no pain at end range     Long Term Goals: 9 months  Pt will demonstrate no less than 90% quad strength symmetry assessed isometrically  Pt will be able to perform all weight lifting exercises at [employer] [facility] reporting no pain  Pt will be able pass vail sports cord testing to demonstrate sufficient strength/ endurance for return to full activity    Plan     Continue progressing ROM/ WB as tolerated per protocol    Patrice Ayala, PT

## 2024-08-05 ENCOUNTER — CLINICAL SUPPORT (OUTPATIENT)
Dept: REHABILITATION | Facility: HOSPITAL | Age: 29
End: 2024-08-05
Payer: COMMERCIAL

## 2024-08-05 DIAGNOSIS — M25.561 ACUTE PAIN OF RIGHT KNEE: Primary | ICD-10-CM

## 2024-08-05 PROCEDURE — 97110 THERAPEUTIC EXERCISES: CPT | Performed by: PHYSICAL THERAPIST

## 2024-08-05 PROCEDURE — 97140 MANUAL THERAPY 1/> REGIONS: CPT | Performed by: PHYSICAL THERAPIST

## 2024-08-05 PROCEDURE — 97112 NEUROMUSCULAR REEDUCATION: CPT | Performed by: PHYSICAL THERAPIST

## 2024-08-07 ENCOUNTER — HOSPITAL ENCOUNTER (OUTPATIENT)
Dept: RADIOLOGY | Facility: HOSPITAL | Age: 29
Discharge: HOME | End: 2024-08-07
Attending: ORTHOPAEDIC SURGERY
Payer: COMMERCIAL

## 2024-08-07 ENCOUNTER — OFFICE VISIT (OUTPATIENT)
Dept: SPORTS MEDICINE | Facility: CLINIC | Age: 29
End: 2024-08-07
Payer: COMMERCIAL

## 2024-08-07 ENCOUNTER — CLINICAL SUPPORT (OUTPATIENT)
Dept: REHABILITATION | Facility: HOSPITAL | Age: 29
End: 2024-08-07
Payer: COMMERCIAL

## 2024-08-07 VITALS
WEIGHT: 236.25 LBS | DIASTOLIC BLOOD PRESSURE: 79 MMHG | SYSTOLIC BLOOD PRESSURE: 131 MMHG | HEIGHT: 75 IN | BODY MASS INDEX: 29.37 KG/M2 | HEART RATE: 80 BPM

## 2024-08-07 DIAGNOSIS — M22.42 PATELLA, CHONDROMALACIA, LEFT: ICD-10-CM

## 2024-08-07 DIAGNOSIS — M22.41 CHONDROMALACIA OF RIGHT PATELLA: ICD-10-CM

## 2024-08-07 DIAGNOSIS — M23.92 PATELLAR MALALIGNMENT SYNDROME OF LEFT KNEE: ICD-10-CM

## 2024-08-07 DIAGNOSIS — M25.561 ACUTE PAIN OF RIGHT KNEE: Primary | ICD-10-CM

## 2024-08-07 DIAGNOSIS — M25.561 RIGHT KNEE PAIN, UNSPECIFIED CHRONICITY: Primary | ICD-10-CM

## 2024-08-07 DIAGNOSIS — M22.41 CHONDROMALACIA OF RIGHT PATELLOFEMORAL JOINT: ICD-10-CM

## 2024-08-07 DIAGNOSIS — M25.561 RIGHT KNEE PAIN, UNSPECIFIED CHRONICITY: ICD-10-CM

## 2024-08-07 PROCEDURE — 97112 NEUROMUSCULAR REEDUCATION: CPT | Performed by: PHYSICAL THERAPIST

## 2024-08-07 PROCEDURE — 73564 X-RAY EXAM KNEE 4 OR MORE: CPT | Mod: TC,50

## 2024-08-07 PROCEDURE — 99999 PR PBB SHADOW E&M-EST. PATIENT-LVL III: CPT | Mod: PBBFAC,,, | Performed by: ORTHOPAEDIC SURGERY

## 2024-08-07 PROCEDURE — 99024 POSTOP FOLLOW-UP VISIT: CPT | Mod: S$GLB,,, | Performed by: ORTHOPAEDIC SURGERY

## 2024-08-07 PROCEDURE — 1159F MED LIST DOCD IN RCRD: CPT | Mod: CPTII,S$GLB,, | Performed by: ORTHOPAEDIC SURGERY

## 2024-08-07 PROCEDURE — 3075F SYST BP GE 130 - 139MM HG: CPT | Mod: CPTII,S$GLB,, | Performed by: ORTHOPAEDIC SURGERY

## 2024-08-07 PROCEDURE — 73564 X-RAY EXAM KNEE 4 OR MORE: CPT | Mod: 26,,, | Performed by: INTERNAL MEDICINE

## 2024-08-07 PROCEDURE — 97530 THERAPEUTIC ACTIVITIES: CPT | Performed by: PHYSICAL THERAPIST

## 2024-08-07 PROCEDURE — 97140 MANUAL THERAPY 1/> REGIONS: CPT | Performed by: PHYSICAL THERAPIST

## 2024-08-07 PROCEDURE — 3078F DIAST BP <80 MM HG: CPT | Mod: CPTII,S$GLB,, | Performed by: ORTHOPAEDIC SURGERY

## 2024-08-12 ENCOUNTER — CLINICAL SUPPORT (OUTPATIENT)
Dept: REHABILITATION | Facility: HOSPITAL | Age: 29
End: 2024-08-12
Payer: COMMERCIAL

## 2024-08-12 DIAGNOSIS — M25.561 ACUTE PAIN OF RIGHT KNEE: Primary | ICD-10-CM

## 2024-08-12 PROCEDURE — 97530 THERAPEUTIC ACTIVITIES: CPT | Performed by: PHYSICAL THERAPIST

## 2024-08-12 PROCEDURE — 97112 NEUROMUSCULAR REEDUCATION: CPT | Performed by: PHYSICAL THERAPIST

## 2024-08-12 PROCEDURE — 97140 MANUAL THERAPY 1/> REGIONS: CPT | Performed by: PHYSICAL THERAPIST

## 2024-08-12 NOTE — PROGRESS NOTES
[facility]   Physical Therapy Treatment Note      Name: Ab Girard  [facility] Number: [id number]    Therapy Diagnosis:   Encounter Diagnosis   Name Primary?    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 Right Osteochondral allograft knee, open 90813  2.  Right Anterior tibial tubercleplasty 29268  3.  Right 23989 Lateral retinacular release, open  4. Right 14785 Arthroscopy, knee, for removal of loose body or foreign body  5. Right 66516 Arthroscopy, debridement/shaving of articular cartilage (Chondroplasty)  6. Right 05445 Arthroscopy, with lysis of adhesions     Precautions: Standard      Time In: 230  Time Out: 330  Total Appointment Time (timed & untimed codes): 60 minutes     PHYSICAL THERAPY:  The patient should begin physical therapy on postoperative   day # 3 and will be advanced to outpatient therapy as soon as   Possible following discharge.  Weight bearing:toe touch weight bearing to 25% PWB  right leg  Range of Motion:limited to -10 degrees extension and 50 degrees flexion     Brace ROM:  Week 1:    -10-20 degrees  Week 2-3: -10-45/60 degrees  Week 3-4: -10-90 degrees  Week 4-5: - degrees  Week 5-6: - degrees     Weightbearing: All WB immobilizer locked in extension with gait for 6 weeks  Week 1-2: Toe touch to 20% weightbearing  Week 2-3: 25-50% PWB   Week 4-5 : 50% to full weightbearing  Week 6 - transition out of immobilizer     Exercycle and elliptical initiated at 6-8  "weeks  CORE program at 6-8 weeks     No open chain rehabilitation for 3 months  Limited open chain rehab. 3-4 months     Running and cutting exercises at 4-6 months based on single leg balance ability  Full return planned at 9 months  ([facility]     Time In: 1000  Time Out: 1100  Total Billable Time: 60 minutes    Subjective     Pt reports: he had f/u with Dr Quintero, able to progress flexion ROM as tolerated. Knee feeling good doing modified lifting routine at the gym without issues. Min swelling noted but feels its resolving  He was compliant with home exercise program.  Response to previous treatment: /Functional change: tolerated eval well    Pain: 1/10  Location: right knee      Objective      POD: 6 wks 6 days    Observation: Pt presents WBAT on R LE w/ single axillary crutches w/ icarus brace. Incision healing well no drainage.. No signs/ symptoms of infection or DVT     Range of Motion:   Knee Right Left   Active 0-0-90 0-3-148   Passive 0-0-138 0-3-148     Quad set: good  SLR: no lag     Treatment     Ab received the treatments listed below:      therapeutic exercises to develop strength, endurance, ROM, flexibility, posture, and core stabilization for 5  minutes including:     Standing quad stretch 4x30" (pre/post tx)  Education on post op precautions, gait mechanics, POC, prognosis, return to activity     NP today  LLLD into extension w/ heel prop 3# above/below knee 5 min  Heel slides within protocol limits 5 min   HS strap stretch off EOT 3x30 sec  Gastroc strap stretch 3x30 sec     manual therapy techniques: Joint mobilizations were applied to the: R knee for 10 minutes, including:     Patellar mobs  Scar mob   Knee flexion off EOT  Knee ext hinge stretch    neuromuscular re-education activities to improve: Balance, Coordination, Kinesthetic, Sense, Proprioception, and Posture for 20 minutes. The following activities were included:    Quad set w/ 2# x30  SLR 2# 3x10 3" " "hold  BFR 70% LOP 30/15/15/15   - SLR        NP today  SL hip abd 2# 3x15  SL bridge    - Prone hip ext 0#  DL bridge 4x10  T-scope locked in ext  SLR 2# 3x15  SL hip abduction 2# 3x15  biofeedback 900mV threshold 10"/10" duty cycle    therapeutic activities to improve functional performance for 25  minutes, including:    Upright bike for 10 min for cartilage health/ LE endurance training   BFR 70% LOP 30/15/15/15  - SL squat on leg press 100#   - DL squat w/ R quad bias to 24" box 25# KB   - staggered stance RDL 25# KB     NP today   BFR 50% LOP  SL shuttle press 2 cord 5 x 2 min    Patient Education and Home Exercises       Education provided:   - post op precautions, gait mechanics, POC, prognosis, return to activity     Written Home Exercises Provided: yes. Exercises were reviewed and Ab was able to demonstrate them prior to the end of the session.  Ab demonstrated good  understanding of the education provided. See EMR under Patient Instructions for exercises provided during therapy sessions    Assessment     Pt presents w/ good carryover of knee ROM from last visit. Pt tolerates progressed BFR strengthening well. Pt instructed to continue modifying strengthening routine. Will continue to progress strength/ ROM as tolerated  Ab Is progressing well towards his goals.   Pt prognosis is Good.     Pt will continue to benefit from skilled outpatient physical therapy to address the deficits listed in the problem list box on initial evaluation, provide pt/family education and to maximize pt's level of independence in the home and community environment.     Pt's spiritual, cultural and educational needs considered and pt agreeable to plan of care and goals.     Anticipated barriers to physical therapy: transportation    Goals:     Short Term Goals: 3 weeks      Pt will be independent with HEP and report compliance at least 6 days/week  Pt will demonstrate full passive knee extension and knee flexion to at least " 90 deg  Pt will be able to perform 10 SLR without extension lag to demonstrate improved quad strength for gait     Long term goals: within 12 weeks     Pt will be able to amb 30 min on level surface without gait deviations  Pt will be able to perform sit<>stand to standard chair x10 with equal weight shift  Pt will be able to perform all ADL's/ job responsibilities at [employer] without pain   Pt will demonstrate full symmetrical knee ROM reporting no pain at end range     Long Term Goals: 9 months  Pt will demonstrate no less than 90% quad strength symmetry assessed isometrically  Pt will be able to perform all weight lifting exercises at [employer] reporting no pain  Pt will be able pass vail sports cord testing to demonstrate sufficient strength/ endurance for return to full activity    Plan     Continue progressing ROM/ WB as tolerated per protocol    Patrice Ayala, PT

## 2024-08-16 ENCOUNTER — CLINICAL SUPPORT (OUTPATIENT)
Dept: REHABILITATION | Facility: HOSPITAL | Age: 29
End: 2024-08-16
Payer: COMMERCIAL

## 2024-08-16 DIAGNOSIS — M25.561 ACUTE PAIN OF RIGHT KNEE: Primary | ICD-10-CM

## 2024-08-16 PROCEDURE — 97112 NEUROMUSCULAR REEDUCATION: CPT | Performed by: PHYSICAL THERAPIST

## 2024-08-16 PROCEDURE — 97530 THERAPEUTIC ACTIVITIES: CPT | Performed by: PHYSICAL THERAPIST

## 2024-08-16 NOTE — PROGRESS NOTES
[facility]   Physical Therapy Treatment Note      Name: Ab Girard  [facility] Number: [id number]    Therapy Diagnosis:   Encounter Diagnosis   Name Primary?    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 Right Osteochondral allograft knee, open 25829  2.  Right Anterior tibial tubercleplasty 86977  3.  Right 67638 Lateral retinacular release, open  4. Right 41261 Arthroscopy, knee, for removal of loose body or foreign body  5. Right 78770 Arthroscopy, debridement/shaving of articular cartilage (Chondroplasty)  6. Right 96992 Arthroscopy, with lysis of adhesions     Precautions: Standard      Time In: 230  Time Out: 330  Total Appointment Time (timed & untimed codes): 60 minutes     PHYSICAL THERAPY:  The patient should begin physical therapy on postoperative   day # 3 and will be advanced to outpatient therapy as soon as   Possible following discharge.  Weight bearing:toe touch weight bearing to 25% PWB  right leg  Range of Motion:limited to -10 degrees extension and 50 degrees flexion     Brace ROM:  Week 1:    -10-20 degrees  Week 2-3: -10-45/60 degrees  Week 3-4: -10-90 degrees  Week 4-5: - degrees  Week 5-6: - degrees     Weightbearing: All WB immobilizer locked in extension with gait for 6 weeks  Week 1-2: Toe touch to 20% weightbearing  Week 2-3: 25-50% PWB   Week 4-5 : 50% to full weightbearing  Week 6 - transition out of immobilizer     Exercycle and elliptical initiated at 6-8  "weeks  CORE program at 6-8 weeks     No open chain rehabilitation for 3 months  Limited open chain rehab. 3-4 months     Running and cutting exercises at 4-6 months based on single leg balance ability  Full return planned at 9 months  (Verify with [facility] rehab. Protocol)     Time In: 100  Time Out: 200  Total Billable Time: 60 minutes    Subjective     Pt reports: tolerated last visit well. Pt reports he had some inc swelling since then that he attributes to being back at work and walking on uneven surfaces. Pt reports gym going well, hasn't attempted elliptical yet  He was compliant with home exercise program.  Response to previous treatment: /Functional change: tolerated eval well    Pain: 1/10  Location: right knee      Objective      POD: 7 wks 3 days    Observation: Pt presents WBAT on R LE w/ single axillary crutches w/ icarus brace. Incision healing well no drainage.. No signs/ symptoms of infection or DVT     Range of Motion:   Knee Right Left   Active 0-0-90 0-3-148   Passive 0-0-138 0-3-148     Quad set: good  SLR: no lag     Treatment     Ab received the treatments listed below:      therapeutic exercises to develop strength, endurance, ROM, flexibility, posture, and core stabilization for 5  minutes including:     Standing quad stretch 4x30" (pre/post tx)  Education on post op precautions, gait mechanics, POC, prognosis, return to activity     NP today  LLLD into extension w/ heel prop 3# above/below knee 5 min  Heel slides within protocol limits 5 min   HS strap stretch off EOT 3x30 sec  Gastroc strap stretch 3x30 sec     manual therapy techniques: Joint mobilizations were applied to the: R knee for 0 minutes, including:     Patellar mobs  Scar mob   Knee flexion off EOT  Knee ext hinge stretch    neuromuscular re-education activities to improve: Balance, Coordination, Kinesthetic, Sense, Proprioception, and Posture for 10 minutes. The following activities were included:    SLR 2# 3x10 3" hold  BFR " "70% LOP 30/15/15/15  - SL bridge         NP today  BFR 70% LOP 30/15/15/15   - SLR  SL hip abd 2# 3x15   - Prone hip ext 0#  DL bridge 4x10  T-scope locked in ext  SLR 2# 3x15  SL hip abduction 2# 3x15  biofeedback 900mV threshold 10"/10" duty cycle    therapeutic activities to improve functional performance for 45  minutes, including:    Upright bike for 10 min for cartilage health/ LE endurance training   BFR 70% LOP 30/15/15/15  - SL squat on leg press 100#   - 6" step up    - SLDL 25# KB    - DL squats on rocker board     NP today   BFR 50% LOP  SL shuttle press 2 cord 5 x 2 min    Patient Education and Home Exercises       Education provided:   - post op precautions, gait mechanics, POC, prognosis, return to activity     Written Home Exercises Provided: yes. Exercises were reviewed and Ab was able to demonstrate them prior to the end of the session.  Ab demonstrated good  understanding of the education provided. See EMR under Patient Instructions for exercises provided during therapy sessions    Assessment     Pt presents w/ min swelling to knee upon entry, good carryover of ROM. Pt tolerates progressed BFR CKC strengthening well today. Pt instructed to continue propping while at desk at work. Will continue to progress strength / ROM as tolerated.    Ab Is progressing well towards his goals.   Pt prognosis is Good.     Pt will continue to benefit from skilled outpatient physical therapy to address the deficits listed in the problem list box on initial evaluation, provide pt/family education and to maximize pt's level of independence in the home and community environment.     Pt's spiritual, cultural and educational needs considered and pt agreeable to plan of care and goals.     Anticipated barriers to physical therapy: transportation    Goals:     Short Term Goals: 3 weeks      Pt will be independent with HEP and report compliance at least 6 days/week  Pt will demonstrate full passive knee extension " and knee flexion to at least 90 deg  Pt will be able to perform 10 SLR without extension lag to demonstrate improved quad strength for gait     Long term goals: within 12 weeks     Pt will be able to amb 30 min on level surface without gait deviations  Pt will be able to perform sit<>stand to standard chair x10 with equal weight shift  Pt will be able to perform all ADL's/ job responsibilities at [employer] without pain   Pt will demonstrate full symmetrical knee ROM reporting no pain at end range     Long Term Goals: 9 months  Pt will demonstrate no less than 90% quad strength symmetry assessed isometrically  Pt will be able to perform all weight lifting exercises at [employer] reporting no pain  Pt will be able pass vail sports cord testing to demonstrate sufficient strength/ endurance for return to full activity    Plan     Continue progressing ROM/ WB as tolerated per protocol    Patrice Ayala, PT

## 2024-08-21 ENCOUNTER — CLINICAL SUPPORT (OUTPATIENT)
Dept: REHABILITATION | Facility: HOSPITAL | Age: 29
End: 2024-08-21
Payer: COMMERCIAL

## 2024-08-21 DIAGNOSIS — M25.561 ACUTE PAIN OF RIGHT KNEE: Primary | ICD-10-CM

## 2024-08-21 PROCEDURE — 97530 THERAPEUTIC ACTIVITIES: CPT | Performed by: PHYSICAL THERAPIST

## 2024-08-21 PROCEDURE — 97112 NEUROMUSCULAR REEDUCATION: CPT | Performed by: PHYSICAL THERAPIST

## 2024-08-22 NOTE — PROGRESS NOTES
[facility]   Physical Therapy Treatment Note      Name: Ab Girard  [facility] Number: [id number]    Therapy Diagnosis:   Encounter Diagnosis   Name Primary?    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 Right Osteochondral allograft knee, open 90614  2.  Right Anterior tibial tubercleplasty 73592  3.  Right 91777 Lateral retinacular release, open  4. Right 49606 Arthroscopy, knee, for removal of loose body or foreign body  5. Right 96317 Arthroscopy, debridement/shaving of articular cartilage (Chondroplasty)  6. Right 22504 Arthroscopy, with lysis of adhesions     Precautions: Standard      Time In: 230  Time Out: 330  Total Appointment Time (timed & untimed codes): 60 minutes     PHYSICAL THERAPY:  The patient should begin physical therapy on postoperative   day # 3 and will be advanced to outpatient therapy as soon as   Possible following discharge.  Weight bearing:toe touch weight bearing to 25% PWB  right leg  Range of Motion:limited to -10 degrees extension and 50 degrees flexion     Brace ROM:  Week 1:    -10-20 degrees  Week 2-3: -10-45/60 degrees  Week 3-4: -10-90 degrees  Week 4-5: - degrees  Week 5-6: - degrees     Weightbearing: All WB immobilizer locked in extension with gait for 6 weeks  Week 1-2: Toe touch to 20% weightbearing  Week 2-3: 25-50% PWB   Week 4-5 : 50% to full weightbearing  Week 6 - transition out of immobilizer     Exercycle and elliptical initiated at 6-8  "weeks  CORE program at 6-8 weeks     No open chain rehabilitation for 3 months  Limited open chain rehab. 3-4 months     Running and cutting exercises at 4-6 months based on single leg balance ability  Full return planned at 9 months  ([facility]     Time In: 230  Time Out: 330  Total Billable Time: 60 minutes    Subjective     Pt reports: hasn't had as much swelling since last visit. Tolerating modified gym routine well. No issues with HEP  He was compliant with home exercise program.  Response to previous treatment: /Functional change: tolerated eval well    Pain: 1/10  Location: right knee      Objective      POD: 8 wks 1 days    Observation: Pt presents WBAT on R LE w/ single axillary crutches w/ icarus brace. Incision healing well no drainage.. No signs/ symptoms of infection or DVT     Range of Motion:   Knee Right Left   Active 0-0-90 0-3-148   Passive 0-0-138 0-3-148     Quad set: good  SLR: no lag     Treatment     Ab received the treatments listed below:      therapeutic exercises to develop strength, endurance, ROM, flexibility, posture, and core stabilization for 3 minutes including:     Standing quad stretch 3x30" (pre/post tx)  Education on post op precautions, gait mechanics, POC, prognosis, return to activity     NP today  LLLD into extension w/ heel prop 3# above/below knee 5 min  Heel slides within protocol limits 5 min   HS strap stretch off EOT 3x30 sec  Gastroc strap stretch 3x30 sec     manual therapy techniques: Joint mobilizations were applied to the: R knee for 0 minutes, including:     Patellar mobs  Scar mob   Knee flexion off EOT  Knee ext hinge stretch    neuromuscular re-education activities to improve: Balance, Coordination, Kinesthetic, Sense, Proprioception, and Posture for 15 minutes. The following activities were included:    SL bridge 4x10  BFR 70% LOP 30/15/15/15  - SLR      NP today  BFR 70% LOP 30/15/15/15  SLR 2# 3x10 3" hold  SL hip abd 2# " "3x15   - Prone hip ext 0#  DL bridge 4x10  T-scope locked in ext  SLR 2# 3x15  SL hip abduction 2# 3x15  biofeedback 900mV threshold 10"/10" duty cycle    therapeutic activities to improve functional performance for 42 minutes, including:    Elliptical for 10 min for cartilage health/ LE endurance training   BFR 70% LOP 30/15/15/15  - SL squat on leg press 100#   - DL box squat 20" box w/ 25#    SLDL 25# KB        NP today   BFR 50% LOP  SL shuttle press 2 cord 5 x 2 min    Patient Education and Home Exercises       Education provided:   - post op precautions, gait mechanics, POC, prognosis, return to activity     Written Home Exercises Provided: yes. Exercises were reviewed and Ab was able to demonstrate them prior to the end of the session.  Ab demonstrated good  understanding of the education provided. See EMR under Patient Instructions for exercises provided during therapy sessions    Assessment     Pt presents w/ improved swelling since last visit. Pt tolerates quad stretch well. Progressed CKC strengthening with BFR, pt reports good tolerance. Will continue to progress strength/ROM as tolerated per protocol    Ab Is progressing well towards his goals.   Pt prognosis is Good.     Pt will continue to benefit from skilled outpatient physical therapy to address the deficits listed in the problem list box on initial evaluation, provide pt/family education and to maximize pt's level of independence in the home and community environment.     Pt's spiritual, cultural and educational needs considered and pt agreeable to plan of care and goals.     Anticipated barriers to physical therapy: transportation    Goals:     Short Term Goals: 3 weeks      Pt will be independent with HEP and report compliance at least 6 days/week  Pt will demonstrate full passive knee extension and knee flexion to at least 90 deg  Pt will be able to perform 10 SLR without extension lag to demonstrate improved quad strength for gait   "   Long term goals: within 12 weeks     Pt will be able to amb 30 min on level surface without gait deviations  Pt will be able to perform sit<>stand to standard chair x10 with equal weight shift  Pt will be able to perform all ADL's/ job responsibilities at [employer] without pain   Pt will demonstrate full symmetrical knee ROM reporting no pain at end range     Long Term Goals: 9 months  Pt will demonstrate no less than 90% quad strength symmetry assessed isometrically  Pt will be able to perform all weight lifting exercises at [employer] reporting no pain  Pt will be able pass vail sports cord testing to demonstrate sufficient strength/ endurance for return to full activity    Plan     Continue progressing ROM/ WB as tolerated per protocol    Patrice Ayala, PT

## 2024-08-23 ENCOUNTER — CLINICAL SUPPORT (OUTPATIENT)
Dept: REHABILITATION | Facility: HOSPITAL | Age: 29
End: 2024-08-23
Payer: COMMERCIAL

## 2024-08-23 DIAGNOSIS — M25.561 ACUTE PAIN OF RIGHT KNEE: Primary | ICD-10-CM

## 2024-08-23 PROCEDURE — 97112 NEUROMUSCULAR REEDUCATION: CPT | Performed by: PHYSICAL THERAPIST

## 2024-08-23 PROCEDURE — 97530 THERAPEUTIC ACTIVITIES: CPT | Performed by: PHYSICAL THERAPIST

## 2024-08-25 ENCOUNTER — PATIENT MESSAGE (OUTPATIENT)
Dept: REHABILITATION | Facility: HOSPITAL | Age: 29
End: 2024-08-25
Payer: COMMERCIAL

## 2024-08-26 NOTE — PROGRESS NOTES
[facility]   Physical Therapy Treatment Note      Name: bA Girard  [facility] Number: [id number]    Therapy Diagnosis:   Encounter Diagnosis   Name Primary?    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 Right Osteochondral allograft knee, open 97831  2.  Right Anterior tibial tubercleplasty 69605  3.  Right 12911 Lateral retinacular release, open  4. Right 71051 Arthroscopy, knee, for removal of loose body or foreign body  5. Right 59827 Arthroscopy, debridement/shaving of articular cartilage (Chondroplasty)  6. Right 10511 Arthroscopy, with lysis of adhesions     Precautions: Standard      Time In: 1200  Time Out: 100  Total Appointment Time (timed & untimed codes): 60 minutes     PHYSICAL THERAPY:  The patient should begin physical therapy on postoperative   day # 3 and will be advanced to outpatient therapy as soon as   Possible following discharge.  Weight bearing:toe touch weight bearing to 25% PWB  right leg  Range of Motion:limited to -10 degrees extension and 50 degrees flexion     Brace ROM:  Week 1:    -10-20 degrees  Week 2-3: -10-45/60 degrees  Week 3-4: -10-90 degrees  Week 4-5: - degrees  Week 5-6: - degrees     Weightbearing: All WB immobilizer locked in extension with gait for 6 weeks  Week 1-2: Toe touch to 20% weightbearing  Week 2-3: 25-50% PWB   Week 4-5 : 50% to full weightbearing  Week 6 - transition out of immobilizer     Exercycle and elliptical initiated at 6-8  "weeks  CORE program at 6-8 weeks     No open chain rehabilitation for 3 months  Limited open chain rehab. 3-4 months     Running and cutting exercises at 4-6 months based on single leg balance ability  Full return planned at 9 months  (Verify with [facility] rehab. Protocol)     Time In: 230  Time Out: 330  Total Billable Time: 60 minutes    Subjective     Pt reports: he has been on his feet a lot at work, endorses R knee feels better than L at the end of the day. HEP going well  He was compliant with home exercise program.  Response to previous treatment: /Functional change: tolerated eval well    Pain: 1/10  Location: right knee      Objective      POD: 8 wks 1 days    Observation: Pt presents WBAT on R LE w/ single axillary crutches w/ icarus brace. Incision healing well no drainage.. No signs/ symptoms of infection or DVT     Range of Motion:   Knee Right Left   Active 0-0-120 0-3-148   Passive 0-0-141 0-3-148     Quad set: good  SLR: no lag     Treatment     Ab received the treatments listed below:      therapeutic exercises to develop strength, endurance, ROM, flexibility, posture, and core stabilization for 3 minutes including:     Standing quad stretch 3x30" (pre/post tx)  Education on post op precautions, gait mechanics, POC, prognosis, return to activity     NP today  LLLD into extension w/ heel prop 3# above/below knee 5 min  Heel slides within protocol limits 5 min   HS strap stretch off EOT 3x30 sec  Gastroc strap stretch 3x30 sec     manual therapy techniques: Joint mobilizations were applied to the: R knee for 0 minutes, including:     Patellar mobs  Scar mob   Knee flexion off EOT  Knee ext hinge stretch    neuromuscular re-education activities to improve: Balance, Coordination, Kinesthetic, Sense, Proprioception, and Posture for 15 minutes. The following activities were included:    SLR 4# 4x8  BFR 70% LOP 30/15/15/15  - SL bridge      NP today  BFR 70% LOP 30/15/15/15  SLR 2# 3x10 3" hold  SL hip " "abd 2# 3x15   - Prone hip ext 0#  DL bridge 4x10      therapeutic activities to improve functional performance for 42 minutes, including:    Elliptical for 7 min for cartilage health/ LE endurance training   BFR 70% LOP 30/15/15/15  - SL squat on leg press 100#  - staggered stance box squat for R quad bias 20" box   - DL rockerboard squat  Dutch squat 0# 4x10       NP today   BFR 50% LOP  SL shuttle press 2 cord 5 x 2 min    Patient Education and Home Exercises       Education provided:   - post op precautions, gait mechanics, POC, prognosis, return to activity     Written Home Exercises Provided: yes. Exercises were reviewed and Ab was able to demonstrate them prior to the end of the session.  Ab demonstrated good  understanding of the education provided. See EMR under Patient Instructions for exercises provided during therapy sessions    Assessment     Pt presents w/ improving knee flexion ROM. Pt tolerates progressed BFR CKC strengthening well today. Pt educated on gym modifications to continue strengthening independently. Will continue to progress strength/ ROM as tolerated.    Ab Is progressing well towards his goals.   Pt prognosis is Good.     Pt will continue to benefit from skilled outpatient physical therapy to address the deficits listed in the problem list box on initial evaluation, provide pt/family education and to maximize pt's level of independence in the home and community environment.     Pt's spiritual, cultural and educational needs considered and pt agreeable to plan of care and goals.     Anticipated barriers to physical therapy: transportation    Goals:     Short Term Goals: 3 weeks      Pt will be independent with HEP and report compliance at least 6 days/week  Pt will demonstrate full passive knee extension and knee flexion to at least 90 deg  Pt will be able to perform 10 SLR without extension lag to demonstrate improved quad strength for gait     Long term goals: within 12 " weeks     Pt will be able to amb 30 min on level surface without gait deviations  Pt will be able to perform sit<>stand to standard chair x10 with equal weight shift  Pt will be able to perform all ADL's/ job responsibilities at [employer] without pain   Pt will demonstrate full symmetrical knee ROM reporting no pain at end range     Long Term Goals: 9 months  Pt will demonstrate no less than 90% quad strength symmetry assessed isometrically  Pt will be able to perform all weight lifting exercises at [facility] reporting no pain  Pt will be able pass vail sports cord testing to demonstrate sufficient strength/ endurance for return to full activity    Plan     Continue progressing ROM/strength as tolerated per protocol    Patrice Ayala, PT

## 2024-08-29 ENCOUNTER — CLINICAL SUPPORT (OUTPATIENT)
Dept: REHABILITATION | Facility: HOSPITAL | Age: 29
End: 2024-08-29
Payer: COMMERCIAL

## 2024-08-29 DIAGNOSIS — M25.561 ACUTE PAIN OF RIGHT KNEE: Primary | ICD-10-CM

## 2024-08-29 PROCEDURE — 97530 THERAPEUTIC ACTIVITIES: CPT | Performed by: PHYSICAL THERAPIST

## 2024-08-29 PROCEDURE — 97112 NEUROMUSCULAR REEDUCATION: CPT | Performed by: PHYSICAL THERAPIST

## 2024-08-29 NOTE — PROGRESS NOTES
[facility]   Physical Therapy Treatment Note      Name: Ab Girard  [facility] Number: [id number]    Therapy Diagnosis:   Encounter Diagnosis   Name Primary?    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 Right Osteochondral allograft knee, open 04934  2.  Right Anterior tibial tubercleplasty 79448  3.  Right 91250 Lateral retinacular release, open  4. Right 29478 Arthroscopy, knee, for removal of loose body or foreign body  5. Right 69208 Arthroscopy, debridement/shaving of articular cartilage (Chondroplasty)  6. Right 03413 Arthroscopy, with lysis of adhesions     Precautions: Standard      Time In: 100  Time Out: 200  Total Appointment Time (timed & untimed codes): 60 minutes     PHYSICAL THERAPY:  The patient should begin physical therapy on postoperative   day # 3 and will be advanced to outpatient therapy as soon as   Possible following discharge.  Weight bearing:toe touch weight bearing to 25% PWB  right leg  Range of Motion:limited to -10 degrees extension and 50 degrees flexion     Brace ROM:  Week 1:    -10-20 degrees  Week 2-3: -10-45/60 degrees  Week 3-4: -10-90 degrees  Week 4-5: - degrees  Week 5-6: - degrees     Weightbearing: All WB immobilizer locked in extension with gait for 6 weeks  Week 1-2: Toe touch to 20% weightbearing  Week 2-3: 25-50% PWB   Week 4-5 : 50% to full weightbearing  Week 6 - transition out of immobilizer     Exercycle and elliptical initiated at 6-8  "weeks  CORE program at 6-8 weeks     No open chain rehabilitation for 3 months  Limited open chain rehab. 3-4 months     Running and cutting exercises at 4-6 months based on single leg balance ability  Full return planned at 9 months  (Verify with [facility] rehab. Protocol)     Time In: 230  Time Out: 330  Total Billable Time: 60 minutes    Subjective     Pt reports: both knees have been a little achy from standing/ walking on uneven surfaces at work  He was compliant with home exercise program.  Response to previous treatment: /Functional change: workouts going well    Pain: 1/10  Location: right knee      Objective      POD: 9 wks 2 days    Observation: Pt presents WBAT on R LE w/ single axillary crutches w/ icarus brace. Incision healing well no drainage.. No signs/ symptoms of infection or DVT     Range of Motion:   Knee Right Left   Active 0-0-120 0-3-148   Passive 0-0-141 0-3-148     Quad set: good  SLR: no lag     Treatment     Ab received the treatments listed below:      therapeutic exercises to develop strength, endurance, ROM, flexibility, posture, and core stabilization for 3 minutes including:     Standing quad stretch 3x30" (pre/post tx)  Education on post op precautions, gait mechanics, POC, prognosis, return to activity     NP today  LLLD into extension w/ heel prop 3# above/below knee 5 min  Heel slides within protocol limits 5 min   HS strap stretch off EOT 3x30 sec  Gastroc strap stretch 3x30 sec     manual therapy techniques: Joint mobilizations were applied to the: R knee for 0 minutes, including:     Patellar mobs  Scar mob   Knee flexion off EOT  Knee ext hinge stretch    neuromuscular re-education activities to improve: Balance, Coordination, Kinesthetic, Sense, Proprioception, and Posture for 30 minutes. The following activities were included:    SLR 4# 4x8  SLR 4# 4x15  BFR 70% LOP 30/15/15/15  - SL bridge   - 20" box HS bridge       NP today  BFR 70% LOP 30/15/15/15    SL hip abd 2# " "3x15   - Prone hip ext 0#  DL bridge 4x10      therapeutic activities to improve functional performance for 30 minutes, including:    Upright bike for 7 min for cartilage health/ LE endurance training   BFR 70% LOP 30/15/15/15  - SL squat on leg press 120#  - 5" lateral step down    NP today   BFR 50% LOP  SL shuttle press 2 cord 5 x 2 min  Mongolian squat 0# 4x10    Patient Education and Home Exercises       Education provided:   - post op precautions, gait mechanics, POC, prognosis, return to activity     Written Home Exercises Provided: yes. Exercises were reviewed and Ab was able to demonstrate them prior to the end of the session.  Ab demonstrated good  understanding of the education provided. See EMR under Patient Instructions for exercises provided during therapy sessions    Assessment     Pt tolerates BFR progressions well today. Good training effect reported in quad/ HS with strengthening. Will continue to progress ROM/ strength as tolerated    Ab Is progressing well towards his goals.   Pt prognosis is Good.     Pt will continue to benefit from skilled outpatient physical therapy to address the deficits listed in the problem list box on initial evaluation, provide pt/family education and to maximize pt's level of independence in the home and community environment.     Pt's spiritual, cultural and educational needs considered and pt agreeable to plan of care and goals.     Anticipated barriers to physical therapy: transportation    Goals:     Short Term Goals: 3 weeks      Pt will be independent with HEP and report compliance at least 6 days/week  Pt will demonstrate full passive knee extension and knee flexion to at least 90 deg  Pt will be able to perform 10 SLR without extension lag to demonstrate improved quad strength for gait     Long term goals: within 12 weeks     Pt will be able to amb 30 min on level surface without gait deviations  Pt will be able to perform sit<>stand to standard chair " x10 with equal weight shift  Pt will be able to perform all ADL's/ job responsibilities at [employer] without pain   Pt will demonstrate full symmetrical knee ROM reporting no pain at end range     Long Term Goals: 9 months  Pt will demonstrate no less than 90% quad strength symmetry assessed isometrically  Pt will be able to perform all weight lifting exercises at [facility] reporting no pain  Pt will be able pass vail sports cord testing to demonstrate sufficient strength/ endurance for return to full activity    Plan     Continue progressing ROM/strength as tolerated per protocol    Patrice Ayala, PT

## 2024-09-06 ENCOUNTER — CLINICAL SUPPORT (OUTPATIENT)
Dept: REHABILITATION | Facility: HOSPITAL | Age: 29
End: 2024-09-06
Payer: COMMERCIAL

## 2024-09-06 DIAGNOSIS — M25.561 ACUTE PAIN OF RIGHT KNEE: Primary | ICD-10-CM

## 2024-09-06 PROCEDURE — 97530 THERAPEUTIC ACTIVITIES: CPT | Performed by: PHYSICAL THERAPIST

## 2024-09-06 PROCEDURE — 97112 NEUROMUSCULAR REEDUCATION: CPT | Performed by: PHYSICAL THERAPIST

## 2024-09-06 NOTE — PROGRESS NOTES
[facility]   Physical Therapy Treatment Note      Name: Ab Girard  [facility] Number: [id number]    Therapy Diagnosis:   Encounter Diagnosis   Name Primary?    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 Right Osteochondral allograft knee, open 73871  2.  Right Anterior tibial tubercleplasty 33297  3.  Right 14152 Lateral retinacular release, open  4. Right 22714 Arthroscopy, knee, for removal of loose body or foreign body  5. Right 62551 Arthroscopy, debridement/shaving of articular cartilage (Chondroplasty)  6. Right 69500 Arthroscopy, with lysis of adhesions     Precautions: Standard      Time In: 200  Time Out: 300  Total Appointment Time (timed & untimed codes): 60 minutes     PHYSICAL THERAPY:  The patient should begin physical therapy on postoperative   day # 3 and will be advanced to outpatient therapy as soon as   Possible following discharge.  Weight bearing:toe touch weight bearing to 25% PWB  right leg  Range of Motion:limited to -10 degrees extension and 50 degrees flexion     Brace ROM:  Week 1:    -10-20 degrees  Week 2-3: -10-45/60 degrees  Week 3-4: -10-90 degrees  Week 4-5: - degrees  Week 5-6: - degrees     Weightbearing: All WB immobilizer locked in extension with gait for 6 weeks  Week 1-2: Toe touch to 20% weightbearing  Week 2-3: 25-50% PWB   Week 4-5 : 50% to full weightbearing  Week 6 - transition out of immobilizer     Exercycle and elliptical initiated at 6-8  "weeks  CORE program at 6-8 weeks     No open chain rehabilitation for 3 months  Limited open chain rehab. 3-4 months     Running and cutting exercises at 4-6 months based on single leg balance ability  Full return planned at 9 months  (Verify with [facility] rehab. Protocol)     Time In: 230  Time Out: 330  Total Billable Time: 60 minutes    Subjective     Pt reports: inc pressure/ pain in knee with hack squat at the gym, leg press feels better bilaterally. All other HEP going well  He was compliant with home exercise program.  Response to previous treatment: /Functional change: workouts going well    Pain: 1/10  Location: right knee      Objective      POD: 9 wks 2 days    Observation: Pt presents WBAT on R LE w/ single axillary crutches w/ icarus brace. Incision healing well no drainage.. No signs/ symptoms of infection or DVT     Range of Motion:   Knee Right Left   Active 0-0-120 0-3-148   Passive 0-0-141 0-3-148     Quad set: good  SLR: no lag     Treatment     Ab received the treatments listed below:      therapeutic exercises to develop strength, endurance, ROM, flexibility, posture, and core stabilization for 3 minutes including:     Standing quad stretch 3x30" (pre/post tx)  Education on post op precautions, gait mechanics, POC, prognosis, return to activity     NP today  LLLD into extension w/ heel prop 3# above/below knee 5 min  Heel slides within protocol limits 5 min   HS strap stretch off EOT 3x30 sec  Gastroc strap stretch 3x30 sec     manual therapy techniques: Joint mobilizations were applied to the: R knee for 0 minutes, including:     Patellar mobs  Scar mob   Knee flexion off EOT  Knee ext hinge stretch    neuromuscular re-education activities to improve: Balance, Coordination, Kinesthetic, Sense, Proprioception, and Posture for 27 minutes. The following activities were included:    SLR 4# 4x15  BFR 70% LOP 30/15/15/15  - SL bridge   SL hip abd 2# 3x15  SL ER 2# 3x20     NP today  BFR 70% LOP " "30/15/15/15  20" box HS bridge       - Prone hip ext 0#  DL bridge 4x10      therapeutic activities to improve functional performance for 30 minutes, including:    Upright bike for 7 min for cartilage health/ LE endurance training   BFR 70% LOP 30/15/15/15  - SL squat on leg press 120#  - wall sit 30" on 60" rest 5 rounds  TRX lunge 4x10    NP today   BFR 50% LOP  SL shuttle press 2 cord 5 x 2 min  Wol[place] squat 0# 4x10    Patient Education and Home Exercises       Education provided:   - post op precautions, gait mechanics, POC, prognosis, return to activity     Written Home Exercises Provided: yes. Exercises were reviewed and Ab was able to demonstrate them prior to the end of the session.  Ab demonstrated good  understanding of the education provided. See EMR under Patient Instructions for exercises provided during therapy sessions    Assessment     Pt presents reporting pain with squat/ lunging. Pt benefits from glute med, deep hip ER strengthening to improve tolerance to CKC strengthening. Pt reports improved pain w/ lunges after glute exercises. Will continue to progress quad/ glute strength as tolerated.    Ab Is progressing well towards his goals.   Pt prognosis is Good.     Pt will continue to benefit from skilled outpatient physical therapy to address the deficits listed in the problem list box on initial evaluation, provide pt/family education and to maximize pt's level of independence in the home and community environment.     Pt's spiritual, cultural and educational needs considered and pt agreeable to plan of care and goals.     Anticipated barriers to physical therapy: transportation    Goals:     Short Term Goals: 3 weeks      Pt will be independent with HEP and report compliance at least 6 days/week  Pt will demonstrate full passive knee extension and knee flexion to at least 90 deg  Pt will be able to perform 10 SLR without extension lag to demonstrate improved quad strength for gait   "   Long term goals: within 12 weeks     Pt will be able to amb 30 min on level surface without gait deviations  Pt will be able to perform sit<>stand to standard chair x10 with equal weight shift  Pt will be able to perform all ADL's/ job responsibilities at [facility] [employer] without pain   Pt will demonstrate full symmetrical knee ROM reporting no pain at end range     Long Term Goals: 9 months  Pt will demonstrate no less than 90% quad strength symmetry assessed isometrically  Pt will be able to perform all weight lifting exercises at [facility] reporting no pain  Pt will be able pass vail sports cord testing to demonstrate sufficient strength/ endurance for return to full activity    Plan     Continue progressing ROM/strength as tolerated per protocol    Patrice Ayala, PT

## 2024-09-18 NOTE — PROGRESS NOTES
Subjective:          Chief Complaint: Ab Girard is a 29 y.o. male who had concerns including Pain of the Right Knee.    Ab Girard comes to clinic for 12 week postoperative visit of the below procedures.  Patient has been compliant with all restrictions and is doing well. Taking medications as prescribed. Completing PT with Patrice at [facility]. He does notice right medial knee pain with occasional movements     DATE OF PROCEDURE: 6/25/2024     ATTENDING SURGEON: Surgeons and Role:     * Caroline Quintero MD - Primary     Assistants:  MD Alexa Tamez  [facility]     It was medically necessary for Akash Soto MD to perform first assistant duties aiding in setup, exposure and closure.     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 Right  Osteochondral allograft knee, open 75366  2.  Right  Anterior tibial tubercleplasty 67005  3.  Right  15649 Lateral retinacular release, open  4. Right  63785 Arthroscopy, knee, for removal of loose body or foreign body  5. Right  58771 Arthroscopy, debridement/shaving of articular cartilage (Chondroplasty)  6. Right  60735 Arthroscopy, with lysis of adhesions          Findings:      ARTICULAR CARTILAGE LESION(S):  Medial Femoral Condyle: ICRS Grade 4A                 Size:  3 x 6 cm  Medial tibial plateau: ICRS Grade 2                 Size: 2.5 x 2.5 cm        Lateral Femoral Condyle: ICRS Grade 0                 Size: none  Lateral tibial plateau: ICRS Grade 2                 Size: 2.0 x 2.0 cm       Patellar surface: ICRS Grade 4A                 Size: 3.0 x 3.0 cm  Trochlear groove: ICRS Grade 4A                 Size: 3.5 x 3.5 cm          Review of Systems    Constitutional: Negative for fever and night sweats.   HENT:  Negative for hearing loss.    Eyes:  Negative for blurred vision and visual disturbance.   Cardiovascular:  Negative for chest pain and leg swelling.   Respiratory:  Negative for shortness of breath.    Endocrine: Negative for polyuria.   Hematologic/Lymphatic: Negative for bleeding problem.   Skin:  Negative for rash.   Musculoskeletal:  Negative for back pain, joint pain, joint swelling, muscle cramps and muscle weakness.   Gastrointestinal:  Negative for melena.   Genitourinary:  Negative for hematuria.   Neurological:  Negative for loss of balance, numbness and paresthesias.   Psychiatric/Behavioral:  Negative for altered mental status.        Pain Related Questions  Over the past 3 days, what was your average pain during activity? (I.e. running, jogging, walking, climbing stairs, getting dressed, ect.): 2  Over the past 3 days, what was your highest pain level?: 3  Over the past 3 days, what was your lowest pain level? : 0    Other  How many nights a week are you awakened by your affected body part?: 0  Was the patient's HEIGHT measured or patient reported?: Patient Reported  Was the patient's WEIGHT measured or patient reported?: Measured      Objective:        General: Ab is well-developed, well-nourished, appears stated age, in no acute distress, alert and oriented to time, place and person.     General    Vitals reviewed.  Constitutional: He is oriented to person, place, and time. He appears well-developed and well-nourished. No distress.   HENT:   Mouth/Throat: No oropharyngeal exudate.   Eyes: Right eye exhibits no discharge. Left eye exhibits no discharge.   Pulmonary/Chest: Effort normal and breath sounds normal. No respiratory distress.   Neurological: He is alert and oriented to person, place, and time. He has normal reflexes. No cranial nerve deficit. Coordination normal.   Psychiatric: He has a normal mood and affect. His behavior is  normal. Judgment and thought content normal.     General Musculoskeletal Exam   Gait: abnormal       Right Knee Exam     Inspection   Erythema: absent  Scars: present  Swelling: present  Effusion: absent  Deformity: absent  Bruising: present    Tenderness   The patient is experiencing no tenderness.     Range of Motion   Extension:  0   Flexion:  30     Tests   Meniscus   Royal:  Medial - negative Lateral - negative  Ligament Examination   Lachman: normal (-1 to 2mm)   PCL-Posterior Drawer: normal (0 to 2mm)     MCL - Valgus: normal (0 to 2mm)  LCL - Varus: normal  Pivot Shift: normal (Equal)  Reverse Pivot Shift: normal (Equal)  Dial Test at 30 degrees: normal (< 5 degrees)  Dial Test at 90 degrees: normal (< 5 degrees)  Posterior Sag Test: negative  Posterolateral Corner: stable  Patella   Patellar apprehension: negative  Passive Patellar Tilt: neutral  Patellar Tracking: normal  Patellar Glide (quadrants): Lateral - 1   Medial - 2  Q-Angle at 90 degrees: normal  Patellar Grind: negative  J-Sign: none    Other   Meniscal Cyst: absent  Popliteal (Baker's) Cyst: absent  Sensation: normal    Comments:  Incision clean, dry, no drainage, erythema  Sutures intact  No sign of infection  Mild swelling  Compartments soft  Neurovascular status intact in extremity        Left Knee Exam   Left knee exam is normal.    Inspection   Erythema: absent  Scars: absent  Swelling: absent  Effusion: absent  Deformity: absent  Bruising: absent    Tenderness   The patient is experiencing no tenderness.     Range of Motion   Extension:  0   Flexion:  140     Tests   Meniscus   Royal:  Medial - negative Lateral - negative  Stability   Lachman: normal (-1 to 2mm)   PCL-Posterior Drawer: normal (0 to 2mm)  MCL - Valgus: normal (0 to 2mm)  LCL - Varus: normal (0 to 2mm)  Pivot Shift: normal (Equal)  Reverse Pivot Shift: normal (Equal)  Dial Test at 30 degrees: normal (< 5 degrees)  Dial Test at 90 degrees: normal (< 5  degrees)  Posterior Sag Test: negative  Posterolateral Corner: stable  Patella   Patellar apprehension: negative  Passive Patellar Tilt: neutral  Patellar Tracking: normal  Patellar Glide (Quadrants): Lateral - 1 Medial - 2  Q-Angle at 90 degrees: normal  Patellar Grind: negative  J-Sign: J sign absent    Other   Meniscal Cyst: absent  Popliteal (Baker's) Cyst: absent  Sensation: normal    Right Hip Exam     Tests   Maynor: negative  Left Hip Exam     Tests   Maynor: negative          Reflexes     Left Side  Achilles:  2+  Quadriceps:  2+    Right Side   Achilles:  2+  Quadriceps:  2+    Vascular Exam     Right Pulses  Dorsalis Pedis:      2+  Posterior Tibial:      2+        Left Pulses  Dorsalis Pedis:      2+  Posterior Tibial:      2+          Radiographic findings today:    Postop osteotomy anterior femoral condyles, posterior patellar articular surface later with fixation screws and anterior tibial tubercle with 2 fixation screws.  New juxta-articular operative site, Hoffa fat pad edema, suspect moderate-sized right supra patellar joint effusion.     Xrays of the right knee were ordered and reviewed by me today. These findings were discussed and reviewed with the patient.    Radiographic Findings:    X-ray Knee Ortho Bilateral with Flexion  Narrative: EXAMINATION:  XR KNEE ORTHO BILAT WITH FLEXION    CLINICAL HISTORY:  Pain in unspecified knee    TECHNIQUE:  AP standing of both knees, PA flexion standing views of both knees, and Merchant views of both knees were performed.  Lateral views of both knees were also performed.    COMPARISON:  August 7, 2024    FINDINGS:  Right knee:    No acute fractures.  Stable postsurgical changes-hardware related to prior tubercleplasty and surgical changes-hardware at the patellofemoral joint with osteotomy and patellar screws.  Intact hardware.  Reconfirmed narrowing of medial compartment and mild genu varum.  Preserved lateral compartment.  Mild narrowing lateral patellofemoral  compartment and lateral patella tilt.  Stable periarticular spurring.  Right knee findings unchanged to earlier exam.    Left knee:    No fracture.  No definite narrowing of medial or lateral tibiofemoral compartments.  Mild narrowing lateral patellofemoral compartment and lateral patella tilt.  Stable periarticular spurring about tibiofemoral and patellofemoral compartments.  No suprapatellar bursal effusion or prepatellar soft tissue swelling.  Left knee findings unchanged to earlier exam.  Impression: As above    Electronically signed by: Eduin Mahajan  Date:    09/23/2024  Time:    12:09         These findings were discussed and reviewed with the patient.   Hip to Ankle: with brace today 2 degrees varus right vs. 3 degrees varus left        Assessment:       Encounter Diagnoses   Name Primary?    Knee pain, unspecified chronicity, unspecified laterality Yes    Chondromalacia of right patellofemoral joint     Internal derangement of right knee     Patellar malalignment syndrome of right knee     Chondromalacia of right knee               Plan:       1. RTC in 3 months with Dr. Caroline Quintero. IKDC, SF-12 and KOOS was not filled out today in clinic. Patient will not fill out IKDC, SF-12 and KOOS on return.    2. Medications: Refills of the following Rx were sent to patients preferred Pharmacy:      3. Physical Therapy: Continue/Begin: Continue at [facility] with Patrice    continue with strengthening     Continue with full ROM    Progress as tolerated   4. HEP: N/A    5. Procedures/Procedural Planning:   N/A    6. DME: Icarus brace     7. Work/Sport Status: not working at this time    8. Visit Summary: Continue with Icarus brace and physical therapy progressing per protocol                           [facility] patient questionnaires have been collected today.

## 2024-09-23 ENCOUNTER — OFFICE VISIT (OUTPATIENT)
Dept: SPORTS MEDICINE | Facility: CLINIC | Age: 29
End: 2024-09-23
Payer: COMMERCIAL

## 2024-09-23 ENCOUNTER — HOSPITAL ENCOUNTER (OUTPATIENT)
Dept: RADIOLOGY | Facility: HOSPITAL | Age: 29
Discharge: HOME | End: 2024-09-23
Attending: ORTHOPAEDIC SURGERY
Payer: COMMERCIAL

## 2024-09-23 ENCOUNTER — TELEPHONE (OUTPATIENT)
Dept: REHABILITATION | Facility: HOSPITAL | Age: 29
End: 2024-09-23
Payer: COMMERCIAL

## 2024-09-23 VITALS
HEIGHT: 75 IN | WEIGHT: 235.88 LBS | BODY MASS INDEX: 29.33 KG/M2 | HEART RATE: 66 BPM | SYSTOLIC BLOOD PRESSURE: 134 MMHG | DIASTOLIC BLOOD PRESSURE: 74 MMHG

## 2024-09-23 DIAGNOSIS — M25.569 KNEE PAIN, UNSPECIFIED CHRONICITY, UNSPECIFIED LATERALITY: Primary | ICD-10-CM

## 2024-09-23 DIAGNOSIS — M25.569 KNEE PAIN, UNSPECIFIED CHRONICITY, UNSPECIFIED LATERALITY: ICD-10-CM

## 2024-09-23 DIAGNOSIS — M22.41 CHONDROMALACIA OF RIGHT PATELLOFEMORAL JOINT: ICD-10-CM

## 2024-09-23 DIAGNOSIS — M23.91 INTERNAL DERANGEMENT OF RIGHT KNEE: ICD-10-CM

## 2024-09-23 DIAGNOSIS — M23.91 PATELLAR MALALIGNMENT SYNDROME OF RIGHT KNEE: ICD-10-CM

## 2024-09-23 DIAGNOSIS — M94.261 CHONDROMALACIA OF RIGHT KNEE: ICD-10-CM

## 2024-09-23 PROCEDURE — 73564 X-RAY EXAM KNEE 4 OR MORE: CPT | Mod: 26,,, | Performed by: RADIOLOGY

## 2024-09-23 PROCEDURE — 73564 X-RAY EXAM KNEE 4 OR MORE: CPT | Mod: TC,50

## 2024-09-23 PROCEDURE — 3008F BODY MASS INDEX DOCD: CPT | Mod: CPTII,S$GLB,, | Performed by: ORTHOPAEDIC SURGERY

## 2024-09-23 PROCEDURE — 1159F MED LIST DOCD IN RCRD: CPT | Mod: CPTII,S$GLB,, | Performed by: ORTHOPAEDIC SURGERY

## 2024-09-23 PROCEDURE — 77073 BONE LENGTH STUDIES: CPT | Mod: 26,,, | Performed by: INTERNAL MEDICINE

## 2024-09-23 PROCEDURE — 99999 PR PBB SHADOW E&M-EST. PATIENT-LVL III: CPT | Mod: PBBFAC,,, | Performed by: ORTHOPAEDIC SURGERY

## 2024-09-23 PROCEDURE — 99214 OFFICE O/P EST MOD 30 MIN: CPT | Mod: S$GLB,,, | Performed by: ORTHOPAEDIC SURGERY

## 2024-09-23 PROCEDURE — 3078F DIAST BP <80 MM HG: CPT | Mod: CPTII,S$GLB,, | Performed by: ORTHOPAEDIC SURGERY

## 2024-09-23 PROCEDURE — 3075F SYST BP GE 130 - 139MM HG: CPT | Mod: CPTII,S$GLB,, | Performed by: ORTHOPAEDIC SURGERY

## 2024-09-23 PROCEDURE — 77073 BONE LENGTH STUDIES: CPT | Mod: TC

## 2024-09-25 ENCOUNTER — TELEPHONE (OUTPATIENT)
Dept: REHABILITATION | Facility: HOSPITAL | Age: 29
End: 2024-09-25
Payer: COMMERCIAL

## 2024-09-25 NOTE — TELEPHONE ENCOUNTER
Lvm for pt to schedule more f/u PT visits and check in with HEP. Pt given clinic # to call back and schedule

## 2024-10-03 ENCOUNTER — TELEPHONE (OUTPATIENT)
Dept: REHABILITATION | Facility: HOSPITAL | Age: 29
End: 2024-10-03
Payer: COMMERCIAL

## 2024-10-03 NOTE — TELEPHONE ENCOUNTER
Lvm for patient, canceled last scheduled appt. Pt instructed to call clinic or reach out via portal to schedule f/u check in with HEP

## 2024-11-07 ENCOUNTER — TELEPHONE (OUTPATIENT)
Dept: SPORTS MEDICINE | Facility: CLINIC | Age: 29
End: 2024-11-07
Payer: COMMERCIAL

## 2024-11-07 NOTE — TELEPHONE ENCOUNTER
LVM for patient to give our office a call back to check in as we have not heard from him and PT is having a hard time getting him scheduled for more visits.     Alexa   Clinical & Surgical Assistant to Dr. Caroline Quinteor     ----- Message from PT Patrice sent at 11/7/2024  8:57 AM CST -----  Regarding: Haven't been able to get in touch with patient  Hello,    Just wanted to make you aware I haven't seen Ab in clinic or been able to get in contact with him to check in with his knee/ HEP. He was doing well last time I saw him in clinic. His next follow up is on 12/11/24 with Dr. Quintero.    Thanks

## 2024-12-11 ENCOUNTER — OFFICE VISIT (OUTPATIENT)
Dept: SPORTS MEDICINE | Facility: CLINIC | Age: 29
End: 2024-12-11
Payer: COMMERCIAL

## 2024-12-11 ENCOUNTER — HOSPITAL ENCOUNTER (OUTPATIENT)
Dept: RADIOLOGY | Facility: HOSPITAL | Age: 29
Discharge: HOME | End: 2024-12-11
Attending: ORTHOPAEDIC SURGERY
Payer: COMMERCIAL

## 2024-12-11 VITALS
DIASTOLIC BLOOD PRESSURE: 73 MMHG | HEART RATE: 81 BPM | BODY MASS INDEX: 30.56 KG/M2 | SYSTOLIC BLOOD PRESSURE: 148 MMHG | WEIGHT: 245.81 LBS | HEIGHT: 75 IN

## 2024-12-11 DIAGNOSIS — M25.561 RIGHT KNEE PAIN, UNSPECIFIED CHRONICITY: Primary | ICD-10-CM

## 2024-12-11 DIAGNOSIS — M22.41 CHONDROMALACIA OF RIGHT PATELLA: ICD-10-CM

## 2024-12-11 DIAGNOSIS — M25.561 RIGHT KNEE PAIN, UNSPECIFIED CHRONICITY: ICD-10-CM

## 2024-12-11 DIAGNOSIS — M94.261 CHONDROMALACIA OF RIGHT KNEE: ICD-10-CM

## 2024-12-11 DIAGNOSIS — M23.91 PATELLAR MALALIGNMENT SYNDROME OF RIGHT KNEE: ICD-10-CM

## 2024-12-11 DIAGNOSIS — M23.91 INTERNAL DERANGEMENT OF RIGHT KNEE: ICD-10-CM

## 2024-12-11 PROCEDURE — 73564 X-RAY EXAM KNEE 4 OR MORE: CPT | Mod: TC,50

## 2024-12-11 PROCEDURE — 1160F RVW MEDS BY RX/DR IN RCRD: CPT | Mod: CPTII,S$GLB,, | Performed by: ORTHOPAEDIC SURGERY

## 2024-12-11 PROCEDURE — 73564 X-RAY EXAM KNEE 4 OR MORE: CPT | Mod: 26,,, | Performed by: RADIOLOGY

## 2024-12-11 PROCEDURE — 99214 OFFICE O/P EST MOD 30 MIN: CPT | Mod: S$GLB,,, | Performed by: ORTHOPAEDIC SURGERY

## 2024-12-11 PROCEDURE — 3008F BODY MASS INDEX DOCD: CPT | Mod: CPTII,S$GLB,, | Performed by: ORTHOPAEDIC SURGERY

## 2024-12-11 PROCEDURE — 99999 PR PBB SHADOW E&M-EST. PATIENT-LVL IV: CPT | Mod: PBBFAC,,, | Performed by: ORTHOPAEDIC SURGERY

## 2024-12-11 PROCEDURE — 1159F MED LIST DOCD IN RCRD: CPT | Mod: CPTII,S$GLB,, | Performed by: ORTHOPAEDIC SURGERY

## 2024-12-11 PROCEDURE — 3077F SYST BP >= 140 MM HG: CPT | Mod: CPTII,S$GLB,, | Performed by: ORTHOPAEDIC SURGERY

## 2024-12-11 PROCEDURE — 3078F DIAST BP <80 MM HG: CPT | Mod: CPTII,S$GLB,, | Performed by: ORTHOPAEDIC SURGERY

## 2024-12-11 NOTE — PROGRESS NOTES
Subjective:     Chief Complaint: Ab Girard is a 29 y.o. male who had concerns including Pain of the Right Knee.    History of Present Illness    CHIEF COMPLAINT:  - Ab presents today for a 6-month post-operative follow-up visit after knee surgery, likely involving the patella and surrounding structures.    HPI:  Ab is presenting for follow-up approximately six months after knee surgery. He reports doing rehabilitation exercises on his own for the last few months, progressing well with range of motion. He mentions experiencing slight discomfort across the top of the knee when fully extending his leg, characterizing it as above mild discomfort rather than outright pain. He also notes slight pain and discomfort near the surgical site when walking, which he states is typical, describing the pain as consistently across the bottom of the patella during ambulation. He reports a noticeable difference in how his knee feels compared to before the surgery, stating it's pain in a different way now. He takes OTC ibuprofen as needed, typically a few hours after exercising. His current exercise routine mainly consists of leg press and some squat variations, focusing mostly on leg press exercises for the affected knee.    Ab denies pain along the tubercle. Ab denies any formal medical diagnoses.    SURGICAL HISTORY:  - Knee surgery: 6 months ago, involved work on the tubercle and a cartilage graft    WORK STATUS:  - Ab mentions working in the gym, but it's unclear if this is occupation or part of rehabilitation DATE OF PROCEDURE: 6/25/2024     ATTENDING SURGEON: Surgeons and Role:     * Caroline Quintero MD - Primary     Assistants:  MD Alexa Tamez  [facility]     It was medically necessary for Akash Soto MD to perform first assistant duties aiding in setup, exposure and closure.     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 Right  Osteochondral allograft knee, open 27415  2.  Right  Anterior tibial tubercleplasty 61163  3.  Right  13668 Lateral retinacular release, open  4. Right  28910 Arthroscopy, knee, for removal of loose body or foreign body  5. Right  68359 Arthroscopy, debridement/shaving of articular cartilage (Chondroplasty)  6. Right  06380 Arthroscopy, with lysis of adhesions           Findings:      ARTICULAR CARTILAGE LESION(S):  Medial Femoral Condyle: ICRS Grade 4A                 Size:  3 x 6 cm  Medial tibial plateau: ICRS Grade 2                 Size: 2.5 x 2.5 cm        Lateral Femoral Condyle: ICRS Grade 0                 Size: none  Lateral tibial plateau: ICRS Grade 2                 Size: 2.0 x 2.0 cm        Patellar surface: ICRS Grade 4A                 Size: 3.0 x 3.0 cm  Trochlear groove: ICRS Grade 4A                 Size: 3.5 x 3.5 cm                       Objective:     General: Ab is well-developed, well-nourished, appears stated age, in no acute distress, alert and oriented to time, place and person.     General    Vitals reviewed.  Constitutional: He is oriented to person, place, and time. He appears well-developed and well-nourished. No distress.   HENT: Mouth/Throat: No oropharyngeal exudate.   Eyes: Right eye exhibits no discharge. Left eye exhibits no discharge.   Pulmonary/Chest: Effort normal and breath sounds normal. No respiratory distress.   Neurological: He is alert and oriented to person, place, and time. He has normal reflexes. No cranial nerve deficit. Coordination normal.   Psychiatric: He has a normal mood and affect. His behavior is normal. Judgment and thought content normal.     General Musculoskeletal Exam   Gait: normal       Right  Knee Exam     Inspection   Erythema: absent  Scars: present  Swelling: absent  Effusion: absent  Deformity: absent  Bruising: absent    Tenderness   The patient is tender to palpation of the patella.    Range of Motion   Extension:  0   Flexion:  140     Tests   Meniscus   Royal:  Medial - negative Lateral - negative  Ligament Examination   Lachman: normal (-1 to 2mm)   PCL-Posterior Drawer: normal (0 to 2mm)     MCL - Valgus: normal (0 to 2mm)  LCL - Varus: normal  Pivot Shift: normal (Equal)  Reverse Pivot Shift: normal (Equal)  Dial Test at 30 degrees: normal (< 5 degrees)  Dial Test at 90 degrees: normal (< 5 degrees)  Posterior Sag Test: negative  Posterolateral Corner: stable  Patella   Patellar apprehension: negative  Passive Patellar Tilt: neutral  Patellar Tracking: normal  Patellar Glide (quadrants): Lateral - 1   Medial - 2  Q-Angle at 90 degrees: normal  Patellar Grind: positive  J-Sign: none    Other   Meniscal Cyst: absent  Popliteal (Baker's) Cyst: absent  Sensation: normal    Left Knee Exam     Inspection   Erythema: absent  Scars: absent  Swelling: absent  Effusion: absent  Deformity: absent  Bruising: absent    Tenderness   The patient is experiencing no tenderness.     Range of Motion   Extension:  0   Flexion:  140     Tests   Meniscus   Royal:  Medial - negative Lateral - negative  Stability   Lachman: normal (-1 to 2mm)   PCL-Posterior Drawer: normal (0 to 2mm)  MCL - Valgus: normal (0 to 2mm)  LCL - Varus: normal (0 to 2mm)  Pivot Shift: normal (Equal)  Reverse Pivot Shift: normal (Equal)  Dial Test at 30 degrees: normal (< 5 degrees)  Dial Test at 90 degrees: normal (< 5 degrees)  Posterior Sag Test: negative  Posterolateral Corner: stable  Patella   Patellar apprehension: negative  Passive Patellar Tilt: neutral  Patellar Tracking: normal  Patellar Glide (Quadrants): Lateral - 1 Medial - 2  Q-Angle at 90 degrees: normal  Patellar Grind: negative  J-Sign: J sign absent    Other    Meniscal Cyst: absent  Popliteal (Baker's) Cyst: absent  Sensation: normal    Right Hip Exam     Tests   Maynor: negative  Left Hip Exam     Tests   Maynor: negative          Reflexes     Left Side  Achilles:  2+  Quadriceps:  2+    Right Side   Achilles:  2+  Quadriceps:  2+    Vascular Exam     Right Pulses  Dorsalis Pedis:      2+  Posterior Tibial:      2+        Left Pulses  Dorsalis Pedis:      2+  Posterior Tibial:      2+            Radiographic findings today:    X-Ray Hip to Ankle  Narrative: EXAMINATION:  XR HIP TO ANKLE    CLINICAL HISTORY:  Chondromalacia patellae, right knee    TECHNIQUE:  Single view obtained from the hip to the ankle    COMPARISON:  May 1, 2024    FINDINGS:  Surgical changes are present at the right knee.  Osseous structures are intact without fracture or osseous destructive process.  Degenerative changes are present at the hip joints bilaterally.  Degenerative changes are present at the knee bilaterally.  Impression: As above    Electronically signed by: Roxanne Figueroa MD  Date:    09/23/2024  Time:    13:58  X-ray Knee Ortho Bilateral with Flexion  Narrative: EXAMINATION:  XR KNEE ORTHO BILAT WITH FLEXION    CLINICAL HISTORY:  Pain in unspecified knee    TECHNIQUE:  AP standing of both knees, PA flexion standing views of both knees, and Merchant views of both knees were performed.  Lateral views of both knees were also performed.    COMPARISON:  August 7, 2024    FINDINGS:  Right knee:    No acute fractures.  Stable postsurgical changes-hardware related to prior tubercleplasty and surgical changes-hardware at the patellofemoral joint with osteotomy and patellar screws.  Intact hardware.  Reconfirmed narrowing of medial compartment and mild genu varum.  Preserved lateral compartment.  Mild narrowing lateral patellofemoral compartment and lateral patella tilt.  Stable periarticular spurring.  Right knee findings unchanged to earlier exam.    Left knee:    No fracture.  No definite  narrowing of medial or lateral tibiofemoral compartments.  Mild narrowing lateral patellofemoral compartment and lateral patella tilt.  Stable periarticular spurring about tibiofemoral and patellofemoral compartments.  No suprapatellar bursal effusion or prepatellar soft tissue swelling.  Left knee findings unchanged to earlier exam.  Impression: As above    Electronically signed by: Eduin Mahajan  Date:    09/23/2024  Time:    12:09         Xrays of the Right Knee were ordered and reviewed by me today. These findings were discussed and reviewed with the patient.      Assessment:     Encounter Diagnoses   Name Primary?    Right knee pain, unspecified chronicity Yes    Chondromalacia of right knee     Patellar malalignment syndrome of right knee     Chondromalacia of right patella     Internal derangement of right knee         Plan:     Assessment & Plan    MEDICATIONS PRESCRIBED:  - Recommend continuing OTC ibuprofen as needed, particularly before exercise.    IMAGING ORDERS:  - Reviewed x-rays to assess healing of tubercle, graft, and trochlear graft.    RETURN TO ACTIVITY:  - Continue with current exercise regimen, including leg press and squat variations as tolerated. Recommend continuing exercises focusing on quad strengthening to improve kneecap tracking.    PROCEDURES:  - Performed physical exam of the knee, including range of motion tests and palpation. Discussed possibility of MQTFO procedure to address kneecap positioning, but decided against it due to lack of significant symptoms.    FOLLOW UP:  - Follow up in 6 months. Return sooner if any problems arise.           This note was generated with the assistance of ambient listening technology. Verbal consent was obtained by the patient and accompanying visitor(s) for the recording of patient appointment to facilitate this note. I attest to having reviewed and edited the generated note for accuracy, though some syntax or spelling errors may persist. Please  contact the author of this note for any clarification.

## 2025-06-11 ENCOUNTER — OFFICE VISIT (OUTPATIENT)
Dept: SPORTS MEDICINE | Facility: CLINIC | Age: 30
End: 2025-06-11
Payer: COMMERCIAL

## 2025-06-11 ENCOUNTER — HOSPITAL ENCOUNTER (OUTPATIENT)
Dept: RADIOLOGY | Facility: HOSPITAL | Age: 30
Discharge: HOME | End: 2025-06-11
Attending: ORTHOPAEDIC SURGERY
Payer: COMMERCIAL

## 2025-06-11 VITALS
SYSTOLIC BLOOD PRESSURE: 139 MMHG | DIASTOLIC BLOOD PRESSURE: 69 MMHG | WEIGHT: 235.88 LBS | HEART RATE: 80 BPM | HEIGHT: 75 IN | BODY MASS INDEX: 29.33 KG/M2

## 2025-06-11 DIAGNOSIS — M17.0 PRIMARY OSTEOARTHRITIS OF BOTH KNEES: ICD-10-CM

## 2025-06-11 DIAGNOSIS — M25.561 RIGHT KNEE PAIN, UNSPECIFIED CHRONICITY: ICD-10-CM

## 2025-06-11 DIAGNOSIS — M17.11 ARTHRITIS OF RIGHT KNEE: ICD-10-CM

## 2025-06-11 DIAGNOSIS — M25.561 RIGHT KNEE PAIN, UNSPECIFIED CHRONICITY: Primary | ICD-10-CM

## 2025-06-11 PROCEDURE — 73564 X-RAY EXAM KNEE 4 OR MORE: CPT | Mod: 26,,, | Performed by: RADIOLOGY

## 2025-06-11 PROCEDURE — 99214 OFFICE O/P EST MOD 30 MIN: CPT | Mod: S$GLB,,, | Performed by: ORTHOPAEDIC SURGERY

## 2025-06-11 PROCEDURE — 1159F MED LIST DOCD IN RCRD: CPT | Mod: CPTII,S$GLB,, | Performed by: ORTHOPAEDIC SURGERY

## 2025-06-11 PROCEDURE — 99999 PR PBB SHADOW E&M-EST. PATIENT-LVL IV: CPT | Mod: PBBFAC,,, | Performed by: ORTHOPAEDIC SURGERY

## 2025-06-11 PROCEDURE — 73564 X-RAY EXAM KNEE 4 OR MORE: CPT | Mod: TC,50

## 2025-06-11 PROCEDURE — 3078F DIAST BP <80 MM HG: CPT | Mod: CPTII,S$GLB,, | Performed by: ORTHOPAEDIC SURGERY

## 2025-06-11 PROCEDURE — 3075F SYST BP GE 130 - 139MM HG: CPT | Mod: CPTII,S$GLB,, | Performed by: ORTHOPAEDIC SURGERY

## 2025-06-11 PROCEDURE — 3008F BODY MASS INDEX DOCD: CPT | Mod: CPTII,S$GLB,, | Performed by: ORTHOPAEDIC SURGERY

## 2025-06-11 NOTE — PROGRESS NOTES
Subjective:     Chief Complaint: Ab Girard is a 30 y.o. male who had concerns including Pain of the Right Knee.    History of Present Illness    CHIEF COMPLAINT:  - Bilateral knee pain, primarily left knee    HPI:  Ab presents for follow-up regarding knee pain. He reports constant pain located across the bottom of the knee, more tender on the inside. Pain has improved but remains constant. He has difficulty squatting, noting knee instability and shaking. He describes significant downward pressure across the tight bone and crunching sensations. He has been able to perform leg presses and some lighter extensions but cannot load as much weight due to stability issues.    He experiences day-to-day discomfort, particularly when navigating stairs, walking on uneven surfaces, or wearing steel-toed boots at work. He has been doing mobility exercises, including various forms of lunges and stretches, for the past 2-3 months. He previously used hyaluronic acid injections, which were helpful. He takes ibuprofen for pain management but states it is not debilitating to a level requiring stronger medication.    He denies taking any prescription pain medications such as Mobic or Celebrex.    PREVIOUS TREATMENTS:  - Hyaluronic acid injection (likely Synvisc) in the knee: Provided benefit    WORK STATUS:  - Works in an environment with stairs and uneven surfaces  - Wears steel-toed boots and walks on rocks  - Knee pain bothers him day-to-day at work, but not to a level requiring medication           Pain Related Questions  Over the past 3 days, what was your average pain during activity? (I.e. running, jogging, walking, climbing stairs, getting dressed, ect.): 4  Over the past 3 days, what was your highest pain level?: 4  Over the past 3 days, what was your lowest pain level? : 1    Other  How many nights a week are you awakened by your affected body part?: 0  Was the patient's HEIGHT measured or patient reported?: Patient  Reported  Was the patient's WEIGHT measured or patient reported?: Measured    Past Medical History[1]     Past Surgical History:   Procedure Laterality Date    ARTHROSCOPIC REMOVAL OF LOOSE BODY FROM JOINT Right 6/25/2024    Procedure: REMOVAL, LOOSE BODY, JOINT, ARTHROSCOPIC;  Surgeon: Caroline Quintero MD;  Location: [facility] OR;  Service: Orthopedics;  Laterality: Right;    CHONDROPLASTY OF KNEE Right 6/25/2024    Procedure: CHONDROPLASTY, KNEE;  Surgeon: Caroline Quintero MD;  Location: [facility] OR;  Service: Orthopedics;  Laterality: Right;    INSERTION, ALLOGRAFT, OSTEOCHONDRAL Right 6/25/2024    Procedure: INSERTION,ALLOGRAFT,OSTEOCHONDRAL;  Surgeon: Caroline Quintero MD;  Location: [facility] OR;  Service: Orthopedics;  Laterality: Right;  Regional w/Catheter, Adductor, MILEY 50cc    LATERAL RETINACULA RELEASE OF KNEE Right 6/25/2024    Procedure: RELEASE, KNEE, LATERAL RETINACULA;  Surgeon: Caroline Quintero MD;  Location: [facility] OR;  Service: Orthopedics;  Laterality: Right;    JACQUE PROCEDURE      TONSILLECTOMY      TUBERCLEPLASTY-ARTHROSCOPIC Right 6/25/2024    Procedure: TUBERCLEPLASTY, TIBIA, ARTHROSCOPY-ASSISTED;  Surgeon: Caroline Quintero MD;  Location: [facility] OR;  Service: Orthopedics;  Laterality: Right;       Objective:     General: Ab is well-developed, well-nourished, appears stated age, in no acute distress, alert and oriented to time, place and person.     General    Vitals reviewed.  Constitutional: He is oriented to person, place, and time. He appears well-developed and well-nourished. No distress.   HENT: Mouth/Throat: No oropharyngeal exudate.   Eyes: Right eye exhibits no discharge. Left eye exhibits no discharge.   Pulmonary/Chest: Effort normal and breath sounds normal. No respiratory distress.   Neurological: He is alert and oriented to person, place, and time. He has normal reflexes. No cranial nerve deficit. Coordination normal.   Psychiatric: He has a normal mood and affect. His behavior is normal.  Judgment and thought content normal.     General Musculoskeletal Exam   Gait: normal       Right Knee Exam     Inspection   Erythema: absent  Scars: present  Swelling: absent  Effusion: absent  Deformity: absent  Bruising: absent    Tenderness   The patient is tender to palpation of the patella (medial facet).    Crepitus   The patient has crepitus of the patella (mild to moderate).    Range of Motion   Extension:  0   Flexion:  140     Tests   Meniscus   Royal:  Medial - negative Lateral - negative  Ligament Examination   Lachman: normal (-1 to 2mm)   PCL-Posterior Drawer: normal (0 to 2mm)     MCL - Valgus: normal (0 to 2mm)  LCL - Varus: normal  Pivot Shift: normal (Equal)  Reverse Pivot Shift: normal (Equal)  Dial Test at 30 degrees: normal (< 5 degrees)  Dial Test at 90 degrees: normal (< 5 degrees)  Posterior Sag Test: negative  Posterolateral Corner: stable  Patella   Patellar apprehension: negative  Passive Patellar Tilt: neutral  Patellar Tracking: normal  Patellar Glide (quadrants): Lateral - 1   Medial - 2  Q-Angle at 90 degrees: normal  Patellar Grind: negative  J-Sign: none    Other   Meniscal Cyst: absent  Popliteal (Baker's) Cyst: absent  Sensation: normal    Left Knee Exam     Inspection   Erythema: absent  Scars: absent  Swelling: absent  Effusion: absent  Deformity: absent  Bruising: absent    Tenderness   The patient is experiencing no tenderness.     Range of Motion   Extension:  0   Flexion:  140     Tests   Meniscus   Royal:  Medial - negative Lateral - negative  Stability   Lachman: normal (-1 to 2mm)   PCL-Posterior Drawer: normal (0 to 2mm)  MCL - Valgus: normal (0 to 2mm)  LCL - Varus: normal (0 to 2mm)  Pivot Shift: normal (Equal)  Reverse Pivot Shift: normal (Equal)  Dial Test at 30 degrees: normal (< 5 degrees)  Dial Test at 90 degrees: normal (< 5 degrees)  Posterior Sag Test: negative  Posterolateral Corner: stable  Patella   Patellar apprehension: negative  Passive Patellar  Tilt: neutral  Patellar Tracking: normal  Patellar Glide (Quadrants): Lateral - 1 Medial - 2  Q-Angle at 90 degrees: normal  Patellar Grind: negative  J-Sign: J sign absent    Other   Meniscal Cyst: absent  Popliteal (Baker's) Cyst: absent  Sensation: normal    Right Hip Exam     Tests   Maynor: negative  Left Hip Exam     Tests   Maynor: negative          Muscle Strength   Right Lower Extremity   Hip Abduction: 5/5   Quadriceps:  5/5   Hamstrin/5   Left Lower Extremity   Hip Abduction: 5/5   Quadriceps:  5/5   Hamstrin/5     Reflexes     Left Side  Achilles:  2+  Quadriceps:  2+    Right Side   Achilles:  2+  Quadriceps:  2+    Vascular Exam     Right Pulses  Dorsalis Pedis:      2+  Posterior Tibial:      2+        Left Pulses  Dorsalis Pedis:      2+  Posterior Tibial:      2+              Radiographic findings:    X-ray Knee Ortho Bilateral with Flexion  Narrative: EXAMINATION:  XR KNEE ORTHO BILAT WITH FLEXION    CLINICAL HISTORY:  Pain in right knee    TECHNIQUE:  AP standing of both knees, PA flexion standing views of both knees, and Merchant views of both knees were performed.  Lateral views of both knees were also performed.    COMPARISON:  2024    FINDINGS:  Bones are slightly demineralized.  Postoperative change about the right knee.  Joint space narrowing and hypertrophic new bone bilaterally.  Probable postoperative change right medial femoral condyle.  Some lateral displacement of the right greater than left patella.  Suprapatellar effusions noted bilaterally.  Impression: See above    Electronically signed by: Jacky Gr MD  Date:    2024  Time:    11:38       Kellgren-Shoaib : 2     These findings were discussed and reviewed with the patient.     Assessment:     Encounter Diagnoses   Name Primary?    Right knee pain, unspecified chronicity Yes    Arthritis of right knee     Primary osteoarthritis of both knees         Plan:     Assessment & Plan    MEDICATIONS:  -  Continue taking ibuprofen as needed for pain.    PROCEDURES:  - Ordered authorization for Synvisc injection for both knees, to be administered in 2-3 weeks.  - Ab expressed interest in receiving the injection.  - Mentioned potential future treatments including platelet-rich plasma (PRP) and stem cell therapy.    FOLLOW UP:  - Follow up in 2-3 weeks for Synvisc injections.    PATIENT INSTRUCTIONS:  - Avoid single leg lunges and high-impact exercises that cause knee problems.  - Continue lower impact exercises to maintain muscle mass and overall fitness.       Authorization placed for Synvisc one; will need possible later PRP injections    All of the patient's questions were answered and the patient will contact us if they have any questions or concerns in the interim.    This note was generated with the assistance of ambient listening technology. Verbal consent was obtained by the patient and accompanying visitor(s) for the recording of patient appointment to facilitate this note. I attest to having reviewed and edited the generated note for accuracy, though some syntax or spelling errors may persist. Please contact the author of this note for any clarification.             [1] No past medical history on file.

## 2025-06-11 NOTE — PATIENT INSTRUCTIONS
DESCRIPTION  Synvisc-One (hylan G-F 20) is an elastoviscous high molecular weight fluid containing hylan A and hylan B polymers produced from chicken abel. Hylans are derivatives of hyaluronan (sodium hyaluronate). Hylan G-F 20 is unique in that the hyaluronan is chemically cross linked. Hyaluronan is a long-chain polymer containing repeating disaccharide units of Fs-todhjdinrwy-D-acetylglucosamine.     INDICATIONS FOR USE  Synvisc-One is indicated for the treatment of pain in osteoarthritis (OA) of the knee in patients who have failed to respond adequately to conservative nonpharmacologic therapy and simple analgesics, e.g., acetaminophen.     Who is a candidate for Synvisc-One  Patients with knee osteoarthritis, who have tried diet, exercise and over-the-counter pain medication but still have pain, should talk to their doctor to see if Synvisc-One is right for them.     How Synvisc-One is administered  Synvisc-One is a single injection. It's a simple, in-office procedure that only takes a few minutes.     What you can expect following a Synvisc-One knee injection Synvisc-One can provide up to six months of osteoarthritis knee pain relief. Everyone responds differently, but in a medical study* patients experienced relief starting one month after the injection.     After the injection, you can resume normal day-to-day activities, but you should avoid any strenuous activities for about 48 hours.     Contraindication  Do not administer to patients with known hypersensitivity (allergy) to hyaluronan (sodium hyaluronate) preparations.   Do not inject Synvisc-One in the knees of patients having knee joint infections or skin diseases or infections in the area of theinjection site.    What are possible side effects?  Synvisc may occur short-term pain, swelling at the injection site and / or the appearance of synovial exudate after injection. In some cases, exudation may be significant and cause more prolonged pain.      Important Safety Information  Before trying Synvisc-One or SYNVISC, tell your doctor if you are allergic to products from birds - such as feathers, eggs or poultry - or if your leg is swollen or infected. Synvisc-One and SYNVISC are only for injection into the knee, performed by a doctor or other qualified health care professional. Synvisc-One and SYNVISC have not been tested to show pain relief in joints other than the knee. Talk to your doctor before resuming strenuous weight-bearing activities after treatment. Synvisc-One and SYNVISC have not been tested in children, pregnant women or women who are nursing. You should tell your doctor if you think you are pregnant or if you are nursing a child. The side effects most commonly seen when Synvisc-One or SYNVISC is injected into the knee were pain, swelling and/or fluid buildup in or around the knee. Cases where the swelling is extensive or painful should be discussed with your doctor. Allergic reactions such as rash and hives have been reported rarely.

## 2025-07-02 ENCOUNTER — OFFICE VISIT (OUTPATIENT)
Dept: SPORTS MEDICINE | Facility: CLINIC | Age: 30
End: 2025-07-02
Payer: COMMERCIAL

## 2025-07-02 VITALS
WEIGHT: 233.69 LBS | DIASTOLIC BLOOD PRESSURE: 72 MMHG | HEIGHT: 75 IN | HEART RATE: 84 BPM | SYSTOLIC BLOOD PRESSURE: 138 MMHG | BODY MASS INDEX: 29.06 KG/M2

## 2025-07-02 DIAGNOSIS — M25.561 PAIN IN BOTH KNEES, UNSPECIFIED CHRONICITY: Primary | ICD-10-CM

## 2025-07-02 DIAGNOSIS — M25.562 PAIN IN BOTH KNEES, UNSPECIFIED CHRONICITY: Primary | ICD-10-CM

## 2025-07-02 DIAGNOSIS — M17.2 POST-TRAUMATIC OSTEOARTHRITIS OF BOTH KNEES: ICD-10-CM

## 2025-07-02 PROCEDURE — 99999 PR PBB SHADOW E&M-EST. PATIENT-LVL V: CPT | Mod: PBBFAC,,, | Performed by: ORTHOPAEDIC SURGERY

## 2025-07-02 NOTE — PROGRESS NOTES
Subjective:     Chief Complaint: Ab Girard is a 30 y.o. male who had concerns including Injections of the Right Knee and Injections of the Left Knee.    History of Present Illness    CHIEF COMPLAINT:  - Medial knee pain    HPI:  Ab presents for follow-up regarding knee pain. He reports persistent pain in the medial side of the right knee, particularly during activities such as squatting. He describes it as pressure along the inferior aspect during squatting or similar activities, and mostly localized but medial for the rest of the day. His left knee becomes more problematic during strenuous activities, jogging, or ascending stairs.    He has been using OTC glucosamine and MSM supplements from [facility], as well as taking 1000 mg of turmeric daily. He is not currently taking any anti-inflammatory medications, stating that he usually takes them reactively. He is returning for a follow-up injection for his chronic condition.    He denies pain in the kneecap area and any cracking in the knee.           Pain Related Questions  Over the past 3 days, what was your average pain during activity? (I.e. running, jogging, walking, climbing stairs, getting dressed, ect.): 4  Over the past 3 days, what was your highest pain level?: 4  Over the past 3 days, what was your lowest pain level? : 4    Other  How many nights a week are you awakened by your affected body part?: 0  Was the patient's HEIGHT measured or patient reported?: Patient Reported  Was the patient's WEIGHT measured or patient reported?: Measured    Past Medical History[1]     Past Surgical History:   Procedure Laterality Date    ARTHROSCOPIC REMOVAL OF LOOSE BODY FROM JOINT Right 6/25/2024    Procedure: REMOVAL, LOOSE BODY, JOINT, ARTHROSCOPIC;  Surgeon: Caroline Quintero MD;  Location: [facility];  Service: Orthopedics;  Laterality: Right;    CHONDROPLASTY OF KNEE Right 6/25/2024    Procedure: CHONDROPLASTY, KNEE;  Surgeon: Caroline Quintero MD;  Location: [facility];   Service: Orthopedics;  Laterality: Right;    INSERTION, ALLOGRAFT, OSTEOCHONDRAL Right 6/25/2024    Procedure: INSERTION,ALLOGRAFT,OSTEOCHONDRAL;  Surgeon: Caroline Quintero MD;  Location: [facility] OR;  Service: Orthopedics;  Laterality: Right;  Regional w/Catheter, Adductor, MILEY 50cc    LATERAL RETINACULA RELEASE OF KNEE Right 6/25/2024    Procedure: RELEASE, KNEE, LATERAL RETINACULA;  Surgeon: Caroline Quintero MD;  Location: [facility] OR;  Service: Orthopedics;  Laterality: Right;    JACQUE PROCEDURE      TONSILLECTOMY      TUBERCLEPLASTY-ARTHROSCOPIC Right 6/25/2024    Procedure: TUBERCLEPLASTY, TIBIA, ARTHROSCOPY-ASSISTED;  Surgeon: Caroline Quintero MD;  Location: [facility] OR;  Service: Orthopedics;  Laterality: Right;       Objective:     General: Ab is well-developed, well-nourished, appears stated age, in no acute distress, alert and oriented to time, place and person.     Ortho/SPM Exam        Radiographic findings:    X-ray Knee Ortho Bilateral with Flexion  Narrative: EXAMINATION:  XR KNEE ORTHO BILAT WITH FLEXION    CLINICAL HISTORY:  Pain in right knee    TECHNIQUE:  AP standing of both knees, PA flexion standing views of both knees, and Merchant views of both knees were performed.  Lateral views of both knees were also performed.    COMPARISON:  Two thousand twenty-four    FINDINGS:  Postop changes patella, anterior tibial tubercle on right with stable appearance, no hardware failure.  Superimposed moderate DJD medial compartment, chronic sclerotic lucent change medial femoral condyle articular surface, chronic cortical change about patella cortex, mild moderate DJD patellofemoral joint, 1 cm size remote calcification versus spur projects from posterior metaphysis above femoral condyle on right lateral view and juxta-articular tiny calcific densities anterior knee joint.  Left knee joint a moderate DJD patellofemoral joint, less prominent DJD changes medial compartment.  Left knee similar juxta-articular  calcifications, 5 mm size interposed between patellofemoral joint and additional juxta-articular calcification versus spur formation posterior metaphysis difficult femoral shaft.  Impression: No fracture dislocation or joint effusion.  Additional findings above.    Electronically signed by: Juanito Lux MD  Date:    06/11/2025  Time:    16:04        These findings were discussed and reviewed with the patient.     Assessment:     Encounter Diagnoses   Name Primary?    Pain in both knees, unspecified chronicity Yes    Post-traumatic osteoarthritis of both knees         Plan:     Assessment & Plan    MEDICATIONS:  - Take Cosamin ASU supplement.  - Continue taking turmeric supplements.  - Consider trying a higher quality glucosamine chondroitin sulfate supplement.    PROCEDURES:  - Bilateral Synvisc-One injections administered today   - Effects may start to be noticeable in about 2 weeks, with maximum effects expected in 6 weeks from injection.  - Potential for repeating the injection every 6 months if effective.    FOLLOW UP:  - Follow up in 6 months for potential repeat injection.  - Follow up with Sebastien Valladares in 6 months for orthopedic evaluation.    PATIENT INSTRUCTIONS:  - Avoid aggressive activities for about 4 days post-injection.  - Regular activities such as walking are permitted.  - Avoid running long distances or playing basketball for the next few days.           All of the patient's questions were answered and the patient will contact us if they have any questions or concerns in the interim.    This note was generated with the assistance of ambient listening technology. Verbal consent was obtained by the patient and accompanying visitor(s) for the recording of patient appointment to facilitate this note. I attest to having reviewed and edited the generated note for accuracy, though some syntax or spelling errors may persist. Please contact the author of this note for any clarification.              [1] No past medical history on file.

## 2025-07-02 NOTE — PROCEDURES
"Large Joint Aspiration/Injection: bilateral knee    Date/Time: 7/2/2025 2:15 PM    Performed by: Caroline Quintero MD  Authorized by: Caroline Quintero MD    Consent Done?:  Yes (Verbal)  Indications:  Pain and arthritis  Site marked: the procedure site was marked    Timeout: prior to procedure the correct patient, procedure, and site was verified    Prep: patient was prepped and draped in usual sterile fashion    Local anesthesia used?: No    Local anesthetic:  Topical anesthetic    Details:  Needle Size:  22 G  Ultrasonic Guidance for needle placement?: Yes    Images are saved and documented.  Approach: superiorlateral.  Location:  Knee  Laterality:  Bilateral  Site:  Bilateral knee  Medications (Right):  48 mg hylan g-f 20 48 mg/6 mL  Medications (Left):  48 mg hylan g-f 20 48 mg/6 mL  Patient tolerance:  Patient tolerated the procedure well with no immediate complications     Description of ultrasound utilization for needle guidance:   Ultrasound guidance used for needle localization. Images saved and stored for documentation. The knee joint was visualized. Dynamic visualization of the 22g x 1.5" needle was continuous throughout the procedure.     "

## 2025-07-02 NOTE — PATIENT INSTRUCTIONS
What is Cosamin® ASU?    Cosamin ASU is an advanced proprietary formulation that combines [id number]® avocado/soybean unsaponifiables (ASU) with Cosamin's FCHG49® glucosamine and pharmaceutical-grade IHF779® sodium chondroitin sulfate.    For over a decade, Cosamin DS has served as the premium joint health supplement on the market. By combining the exclusive ingredients found in Cosamin DS with ASU, [employer] has made the best even better.    Cosamin ASU is a dual synergistic formula: its specific combination of glucosamine and sodium chondroitin sulfate have demonstrated synergy in stimulating cartilage production,1 while ASU also acts synergistically with glucosamine.  What is ASU and how does it work?    ASU (avocado/soybean unsaponifiables) is obtained from avocados and soybeans. A potent ingredient demonstrated to protect cartilage which leads to improved joint function, ASU complements the effects of the other ingredients. While working through their own primary mechanisms of action, ASU, glucosamine and chondroitin sulfate together deliver comprehensive joint health support. Our trademarked glucosamine hydrochloride, chondroitin sulfate, and avocado/soybean unsaponifiables together were shown in cell studies to be better than the combination of glucosamine and chondroitin sulfate at inhibiting expression of several agents involved in cartilage breakdown.    Cosamin ASU is available at leading pharmacies nationwide ([employer]) and online.    How do I take Cosamin® ASU?        Maximum Protection:      Take 4 capsules per day. Capsules may be taken all at once or divided with meals throughout the day.           Maintenance:      Once desired effect is noticed, you may gradually reduce the number of capsules to maintain comfort level.      Some individuals may respond sooner than others depending on the status of their cartilage and joint health. Once response has been seen, the number of  capsules per day may be decreased to maintain comfort level. Some individuals on this lower level may wish to go back to four capsules a day during the weekends or times of increased activity.      The maintenance level can also be used long-term to help maintain healthy joints. At any time, the number of capsules may be increased back to four capsules per day.      Where to Buy Cosamin® ASU?:    Retail Stores:        [employer]      [employer]      [facility]      [facility]      [employer]      [facility]      [facility]      [employer]      [facility]      [facility]      Eula [employer]     Online Stores:        [employer]      [employer]Js.com      [employer]co.com      [email or web address]      drugstore.com      iherb.[email or web address]      [email or web address]      [email or web address]      [email or web address]      V[employer].com      [email or web address]      [email or web address]

## 2025-07-03 ENCOUNTER — PATIENT MESSAGE (OUTPATIENT)
Dept: SPORTS MEDICINE | Facility: CLINIC | Age: 30
End: 2025-07-03
Payer: COMMERCIAL

## 2025-07-03 PROBLEM — M17.2 POST-TRAUMATIC OSTEOARTHRITIS OF BOTH KNEES: Status: ACTIVE | Noted: 2025-07-03

## (undated) DEVICE — DRAPE ARTHSCP FLD CTRL POUCH

## (undated) DEVICE — KIT TOTAL KNEE TKOFG OMC

## (undated) DEVICE — SUT MONOCYRL 4-0 PS2 UND

## (undated) DEVICE — DRAPE C-ARM ELAS CLIP 42X120IN

## (undated) DEVICE — DRAPE INCISE IOBAN 2 23X17IN

## (undated) DEVICE — IMPLANTABLE DEVICE
Type: IMPLANTABLE DEVICE | Site: KNEE | Status: NON-FUNCTIONAL
Removed: 2024-06-25

## (undated) DEVICE — PAD CAST SPECIALIST STRL 6

## (undated) DEVICE — COVER CAMERA OPERATING ROOM

## (undated) DEVICE — NDL SPINAL 18GX3.5 SPINOCAN

## (undated) DEVICE — SOL NACL IRR 3000ML

## (undated) DEVICE — SEE MEDLINE ITEM 159592

## (undated) DEVICE — SPONGE COTTON TRAY 4X4IN

## (undated) DEVICE — SOL NACL IRR 1000ML BTL

## (undated) DEVICE — TOURNIQUET SB QC DP 34X4IN

## (undated) DEVICE — SHAVER ULTRAFFR 4.2MM

## (undated) DEVICE — DRESSING AQUACEL AG ADV 3.5X6

## (undated) DEVICE — UNDERGLOVES BIOGEL PI SIZE 8.5

## (undated) DEVICE — YANKAUER OPEN TIP W/O VENT

## (undated) DEVICE — ADHESIVE DERMABOND ADVANCED

## (undated) DEVICE — BNDG COFLEX FOAM LF2 ST 6X5YD

## (undated) DEVICE — Device

## (undated) DEVICE — GOWN ECLIPSE REINF LV4 XLNG XL

## (undated) DEVICE — SYR 30CC LUER LOCK

## (undated) DEVICE — ELECTRODE REM PLYHSV RETURN 9

## (undated) DEVICE — COVER TABLE REINF 50X90IN

## (undated) DEVICE — CONTAINER SPECIMEN OR STER 4OZ

## (undated) DEVICE — PAD ELECTRODE STER 1.5X3

## (undated) DEVICE — TUBE SET INFLOW/OUTFLOW

## (undated) DEVICE — GLOVE BIOGEL SKINSENSE PI 7.0

## (undated) DEVICE — TUBING SUC UNIV W/CONN 12FT

## (undated) DEVICE — WRAP KNEE ACCU THERM GEL PACK

## (undated) DEVICE — BIT DRILL 3.2

## (undated) DEVICE — PAD ABDOMINAL STERILE 8X10IN

## (undated) DEVICE — DRAPE TOP 53X102IN

## (undated) DEVICE — NDL 22GA X1 1/2 REG BEVEL

## (undated) DEVICE — UNDERGLOVES BIOGEL PI SZ 7 LF

## (undated) DEVICE — TOWEL OR DISP STRL BLUE 4/PK

## (undated) DEVICE — DRAPE ARTHSCP T ORTHOMAX POUCH

## (undated) DEVICE — UNDERGLOVES BIOGEL PI SIZE 7.5

## (undated) DEVICE — GOWN ECLIPSE REINF L4 XLNG XXL

## (undated) DEVICE — BLADE SAGITTA 5/BX

## (undated) DEVICE — SUT CHROMIC 2-0 SH 27IN BRN

## (undated) DEVICE — SUT VICRYL 6-0 P1 18IN UD

## (undated) DEVICE — MARKER SKIN RULER STERILE

## (undated) DEVICE — DRESSING XEROFORM NONADH 1X8IN

## (undated) DEVICE — DRAPE STERI U-SHAPED 47X51IN

## (undated) DEVICE — NDL HYPO STD REG BVL 22GX1.5IN

## (undated) DEVICE — BLADE SURG CARBON STEEL SZ11

## (undated) DEVICE — SUT VICRYL+ 1 CT1 18IN

## (undated) DEVICE — BIT DRILL QC 4.5X145MMM SS

## (undated) DEVICE — DRAPE INCISE IOBAN 2 23X33IN

## (undated) DEVICE — SUT ETHILON 4-0 PS2 18 BLK

## (undated) DEVICE — PAD ABD 8X10 STERILE

## (undated) DEVICE — TOWEL OR XRAY BLUE 17X26IN

## (undated) DEVICE — BLADE SAG MIC CRS 19.5X41X.4MM

## (undated) DEVICE — TAPE SILK 3IN

## (undated) DEVICE — DRAPE STERI INSTRUMENT 1018

## (undated) DEVICE — SUT VICRYL PLUS 3-0 SH 18IN

## (undated) DEVICE — SUT 5-0 VICRYL / P-3 (J493)

## (undated) DEVICE — DRAPE THREE-QTR REINF 53X77IN

## (undated) DEVICE — GLOVE SENSICARE PI SURG 8.5

## (undated) DEVICE — COVER LIGHT HANDLE 80/CA

## (undated) DEVICE — SUT MCRYL PLUS 4-0 PS2 27IN

## (undated) DEVICE — GLOVE SENSICARE PI GRN 9

## (undated) DEVICE — BRACE KNEE T SCOPE PREMIER

## (undated) DEVICE — APPLICATOR CHLORAPREP ORN 26ML

## (undated) DEVICE — DRAPE C-ARMOR EQUIPMENT COVER